# Patient Record
Sex: MALE | Race: OTHER | Employment: FULL TIME | URBAN - METROPOLITAN AREA
[De-identification: names, ages, dates, MRNs, and addresses within clinical notes are randomized per-mention and may not be internally consistent; named-entity substitution may affect disease eponyms.]

---

## 2017-08-02 RX ORDER — MONTELUKAST SODIUM 10 MG/1
TABLET ORAL
Qty: 30 TABLET | Refills: 0 | OUTPATIENT
Start: 2017-08-02

## 2017-08-15 ENCOUNTER — OFFICE VISIT (OUTPATIENT)
Dept: FAMILY MEDICINE CLINIC | Age: 52
End: 2017-08-15
Payer: OTHER GOVERNMENT

## 2017-08-15 VITALS
DIASTOLIC BLOOD PRESSURE: 82 MMHG | HEIGHT: 68 IN | WEIGHT: 163 LBS | HEART RATE: 64 BPM | SYSTOLIC BLOOD PRESSURE: 118 MMHG | BODY MASS INDEX: 24.71 KG/M2

## 2017-08-15 DIAGNOSIS — E03.9 ACQUIRED HYPOTHYROIDISM: ICD-10-CM

## 2017-08-15 DIAGNOSIS — J30.81 ALLERGIC RHINITIS DUE TO ANIMAL HAIR AND DANDER: Primary | ICD-10-CM

## 2017-08-15 PROCEDURE — 99213 OFFICE O/P EST LOW 20 MIN: CPT | Performed by: FAMILY MEDICINE

## 2017-08-15 RX ORDER — LEVOTHYROXINE SODIUM 112 UG/1
112 TABLET ORAL DAILY
COMMUNITY
End: 2019-05-06 | Stop reason: SDUPTHER

## 2017-08-15 ASSESSMENT — ENCOUNTER SYMPTOMS
DIARRHEA: 0
COUGH: 0
SHORTNESS OF BREATH: 0
EYES NEGATIVE: 1
ABDOMINAL PAIN: 0
CONSTIPATION: 0

## 2017-08-15 ASSESSMENT — PATIENT HEALTH QUESTIONNAIRE - PHQ9
SUM OF ALL RESPONSES TO PHQ QUESTIONS 1-9: 0
SUM OF ALL RESPONSES TO PHQ9 QUESTIONS 1 & 2: 0
1. LITTLE INTEREST OR PLEASURE IN DOING THINGS: 0
2. FEELING DOWN, DEPRESSED OR HOPELESS: 0

## 2017-09-04 RX ORDER — MONTELUKAST SODIUM 10 MG/1
TABLET ORAL
Qty: 30 TABLET | Refills: 3 | Status: SHIPPED | OUTPATIENT
Start: 2017-09-04 | End: 2018-01-07 | Stop reason: SDUPTHER

## 2018-01-08 RX ORDER — MONTELUKAST SODIUM 10 MG/1
TABLET ORAL
Qty: 30 TABLET | Refills: 2 | Status: SHIPPED | OUTPATIENT
Start: 2018-01-08 | End: 2018-04-06 | Stop reason: SDUPTHER

## 2018-02-27 ENCOUNTER — OFFICE VISIT (OUTPATIENT)
Dept: FAMILY MEDICINE CLINIC | Age: 53
End: 2018-02-27
Payer: OTHER GOVERNMENT

## 2018-02-27 VITALS
HEART RATE: 60 BPM | WEIGHT: 156 LBS | BODY MASS INDEX: 23.64 KG/M2 | SYSTOLIC BLOOD PRESSURE: 118 MMHG | DIASTOLIC BLOOD PRESSURE: 78 MMHG | HEIGHT: 68 IN | TEMPERATURE: 98.3 F

## 2018-02-27 DIAGNOSIS — L08.9 INFECTED FINGER: Primary | ICD-10-CM

## 2018-02-27 PROCEDURE — 99213 OFFICE O/P EST LOW 20 MIN: CPT | Performed by: FAMILY MEDICINE

## 2018-02-27 RX ORDER — CEPHALEXIN 500 MG/1
500 CAPSULE ORAL 3 TIMES DAILY
Qty: 21 CAPSULE | Refills: 0 | Status: SHIPPED | OUTPATIENT
Start: 2018-02-27 | End: 2018-03-06

## 2018-02-27 RX ORDER — SILDENAFIL CITRATE 100 MG
TABLET ORAL
COMMUNITY
Start: 2018-02-12 | End: 2019-04-02 | Stop reason: SDUPTHER

## 2018-02-27 ASSESSMENT — ENCOUNTER SYMPTOMS: ROS SKIN COMMENTS: SEE HPI

## 2018-02-27 NOTE — PATIENT INSTRUCTIONS
Patient Education        Paronychia: Care Instructions  Your Care Instructions  Paronychia (say \"[id number]-[id number]\") is an infection of the skin around a fingernail or toenail. It happens when germs enter through a break in the skin. The doctor may have made a small cut in the infected area to drain the pus. Most cases of paronychia improve in a few days. But watch your symptoms and follow your doctor's advice. Though rare, a mild case can turn into something more serious and infect your entire finger or toe. Also, it is possible for an infection to return. Follow-up care is a key part of your treatment and safety. Be sure to make and go to all appointments, and call your doctor if you are having problems. It's also a good idea to know your test results and keep a list of the medicines you take. How can you care for yourself at home? · If your doctor told you how to care for your infected nail, follow the doctor's instructions. If you did not get instructions, follow this general advice:  ¨ Wash the area with clean water 2 times a day. Don't use hydrogen peroxide or alcohol, which can slow healing. ¨ You may cover the area with a thin layer of petroleum jelly, such as Vaseline, and a nonstick bandage. ¨ Apply more petroleum jelly and replace the bandage as needed. · If your doctor prescribed antibiotics, take them as directed. Do not stop taking them just because you feel better. You need to take the full course of antibiotics. · Take an over-the-counter pain medicine, such as acetaminophen (Tylenol), ibuprofen (Advil, Motrin), or naproxen (Aleve). Read and follow all instructions on the label. · Do not take two or more pain medicines at the same time unless the doctor told you to. Many pain medicines have acetaminophen, which is Tylenol. Too much acetaminophen (Tylenol) can be harmful. · Prop up the toe or finger so that it is higher than the level of your heart.  This will help with pain and

## 2018-03-05 ENCOUNTER — OFFICE VISIT (OUTPATIENT)
Dept: FAMILY MEDICINE CLINIC | Age: 53
End: 2018-03-05
Payer: OTHER GOVERNMENT

## 2018-03-05 VITALS
DIASTOLIC BLOOD PRESSURE: 82 MMHG | BODY MASS INDEX: 24.02 KG/M2 | TEMPERATURE: 97.9 F | HEART RATE: 64 BPM | SYSTOLIC BLOOD PRESSURE: 128 MMHG | WEIGHT: 158 LBS

## 2018-03-05 DIAGNOSIS — L03.012 CELLULITIS OF FINGER OF LEFT HAND: Primary | ICD-10-CM

## 2018-03-05 PROCEDURE — 99212 OFFICE O/P EST SF 10 MIN: CPT | Performed by: FAMILY MEDICINE

## 2018-03-05 RX ORDER — SULFAMETHOXAZOLE AND TRIMETHOPRIM 800; 160 MG/1; MG/1
1 TABLET ORAL 2 TIMES DAILY
Qty: 14 TABLET | Refills: 0 | Status: SHIPPED | OUTPATIENT
Start: 2018-03-05 | End: 2018-03-12

## 2018-03-05 ASSESSMENT — ENCOUNTER SYMPTOMS: ROS SKIN COMMENTS: SEE HPI

## 2018-03-07 ENCOUNTER — OFFICE VISIT (OUTPATIENT)
Dept: FAMILY MEDICINE CLINIC | Age: 53
End: 2018-03-07
Payer: OTHER GOVERNMENT

## 2018-03-07 VITALS
HEIGHT: 68 IN | SYSTOLIC BLOOD PRESSURE: 138 MMHG | OXYGEN SATURATION: 98 % | HEART RATE: 60 BPM | DIASTOLIC BLOOD PRESSURE: 80 MMHG | BODY MASS INDEX: 23.79 KG/M2 | WEIGHT: 157 LBS

## 2018-03-07 DIAGNOSIS — L03.012 CELLULITIS OF FINGER OF LEFT HAND: Primary | ICD-10-CM

## 2018-03-07 PROCEDURE — 99212 OFFICE O/P EST SF 10 MIN: CPT | Performed by: FAMILY MEDICINE

## 2018-03-07 ASSESSMENT — ENCOUNTER SYMPTOMS: ROS SKIN COMMENTS: SEE HPI

## 2018-03-07 NOTE — PROGRESS NOTES
Visit Information    Have you changed or started any medications since your last visit including any over-the-counter medicines, vitamins, or herbal medicines? Yes abx  Have you stopped taking any of your medications? Is so, why? -  no  Are you having any side effects from any of your medications? - no    Have you seen any other physician or provider since your last visit?  no   Have you had any other diagnostic tests since your last visit?  no   Have you been seen in the emergency room and/or had an admission in a hospital since we last saw you?  no   Have you had your routine dental cleaning in the past 6 months?  no     Do you have an active MyChart account? If no, what is the barrier?   Yes    Patient Care Team:  Laith Jarrett MD as PCP - General (Family Medicine)    Medical History Review  Past Medical, Family, and Social History reviewed and does contribute to the patient presenting condition    Health Maintenance   Topic Date Due    TSH testing  1965    Shingles Vaccine (1 of 2 - 2 Dose Series) 09/07/2016    Colon cancer screen colonoscopy  09/07/2016    Flu vaccine (1) 08/10/2018 (Originally 9/1/2017)    DTaP/Tdap/Td vaccine (1 - Tdap) 09/16/2019 (Originally 9/7/1985)    Lipid screen  02/26/2020    HIV screen  Completed

## 2018-04-30 ENCOUNTER — OFFICE VISIT (OUTPATIENT)
Dept: FAMILY MEDICINE CLINIC | Age: 53
End: 2018-04-30
Payer: OTHER GOVERNMENT

## 2018-04-30 VITALS
SYSTOLIC BLOOD PRESSURE: 128 MMHG | DIASTOLIC BLOOD PRESSURE: 78 MMHG | WEIGHT: 157 LBS | HEART RATE: 64 BPM | TEMPERATURE: 97.7 F | BODY MASS INDEX: 23.87 KG/M2

## 2018-04-30 DIAGNOSIS — H10.503 BLEPHAROCONJUNCTIVITIS OF BOTH EYES, UNSPECIFIED BLEPHAROCONJUNCTIVITIS TYPE: Primary | ICD-10-CM

## 2018-04-30 PROCEDURE — 99212 OFFICE O/P EST SF 10 MIN: CPT | Performed by: FAMILY MEDICINE

## 2018-04-30 RX ORDER — SULFACETAMIDE SODIUM 100 MG/ML
SOLUTION/ DROPS OPHTHALMIC
COMMUNITY
Start: 2018-04-29 | End: 2018-05-06

## 2018-04-30 ASSESSMENT — ENCOUNTER SYMPTOMS
EYE REDNESS: 1
EYE DISCHARGE: 1
PHOTOPHOBIA: 1
BLURRED VISION: 0

## 2018-06-22 ENCOUNTER — OFFICE VISIT (OUTPATIENT)
Dept: FAMILY MEDICINE CLINIC | Age: 53
End: 2018-06-22
Payer: OTHER GOVERNMENT

## 2018-06-22 ENCOUNTER — HOSPITAL ENCOUNTER (OUTPATIENT)
Age: 53
Setting detail: SPECIMEN
Discharge: HOME | End: 2018-06-22
Payer: OTHER GOVERNMENT

## 2018-06-22 VITALS
BODY MASS INDEX: 23.19 KG/M2 | HEIGHT: 68 IN | DIASTOLIC BLOOD PRESSURE: 80 MMHG | HEART RATE: 64 BPM | WEIGHT: 153 LBS | SYSTOLIC BLOOD PRESSURE: 130 MMHG | TEMPERATURE: 97.5 F

## 2018-06-22 DIAGNOSIS — J02.9 SORE THROAT: Primary | ICD-10-CM

## 2018-06-22 LAB — S PYO AG THROAT QL: NORMAL

## 2018-06-22 PROCEDURE — 99213 OFFICE O/P EST LOW 20 MIN: CPT | Performed by: FAMILY MEDICINE

## 2018-06-22 PROCEDURE — 87880 STREP A ASSAY W/OPTIC: CPT | Performed by: FAMILY MEDICINE

## 2018-06-22 RX ORDER — AMOXICILLIN 875 MG/1
875 TABLET, COATED ORAL 2 TIMES DAILY
Qty: 14 TABLET | Refills: 0 | Status: SHIPPED | OUTPATIENT
Start: 2018-06-22 | End: 2018-06-29

## 2018-06-22 ASSESSMENT — ENCOUNTER SYMPTOMS
NAUSEA: 0
SPUTUM PRODUCTION: 0
SORE THROAT: 1
DIARRHEA: 1
ABDOMINAL PAIN: 0
COUGH: 1

## 2018-06-24 LAB
CULTURE: NORMAL
CULTURE: NORMAL
Lab: NORMAL
SPECIMEN DESCRIPTION: NORMAL
STATUS: NORMAL

## 2018-10-25 ENCOUNTER — TELEPHONE (OUTPATIENT)
Dept: FAMILY MEDICINE CLINIC | Age: 53
End: 2018-10-25

## 2018-10-25 NOTE — TELEPHONE ENCOUNTER
Telephone Outcome: Unable to reach / no voice mail. Tried calling patient to find out if he has had a colonoscopy done and if not if he would be willing to come to our office and  a FIT test

## 2018-12-06 ENCOUNTER — OFFICE VISIT (OUTPATIENT)
Dept: FAMILY MEDICINE CLINIC | Age: 53
End: 2018-12-06
Payer: OTHER GOVERNMENT

## 2018-12-06 VITALS
HEART RATE: 68 BPM | DIASTOLIC BLOOD PRESSURE: 80 MMHG | HEIGHT: 68 IN | BODY MASS INDEX: 23.57 KG/M2 | WEIGHT: 155.5 LBS | SYSTOLIC BLOOD PRESSURE: 118 MMHG

## 2018-12-06 DIAGNOSIS — M25.561 ACUTE PAIN OF RIGHT KNEE: ICD-10-CM

## 2018-12-06 DIAGNOSIS — J30.89 NON-SEASONAL ALLERGIC RHINITIS, UNSPECIFIED TRIGGER: Primary | ICD-10-CM

## 2018-12-06 PROCEDURE — 99213 OFFICE O/P EST LOW 20 MIN: CPT | Performed by: FAMILY MEDICINE

## 2018-12-06 RX ORDER — CETIRIZINE HYDROCHLORIDE 10 MG/1
CAPSULE, LIQUID FILLED ORAL
COMMUNITY

## 2018-12-06 RX ORDER — AZELASTINE 1 MG/ML
2 SPRAY, METERED NASAL 2 TIMES DAILY
Qty: 4 BOTTLE | Refills: 1 | Status: SHIPPED | OUTPATIENT
Start: 2018-12-06 | End: 2020-07-29 | Stop reason: SDUPTHER

## 2018-12-06 ASSESSMENT — PATIENT HEALTH QUESTIONNAIRE - PHQ9
1. LITTLE INTEREST OR PLEASURE IN DOING THINGS: 0
2. FEELING DOWN, DEPRESSED OR HOPELESS: 0
SUM OF ALL RESPONSES TO PHQ QUESTIONS 1-9: 0
SUM OF ALL RESPONSES TO PHQ9 QUESTIONS 1 & 2: 0
SUM OF ALL RESPONSES TO PHQ QUESTIONS 1-9: 0

## 2018-12-06 ASSESSMENT — ENCOUNTER SYMPTOMS
COUGH: 0
RHINORRHEA: 1
ABDOMINAL PAIN: 0
SHORTNESS OF BREATH: 0
EYES NEGATIVE: 1

## 2018-12-06 NOTE — PROGRESS NOTES
Visit Information    Have you changed or started any medications since your last visit including any over-the-counter medicines, vitamins, or herbal medicines? no   Are you having any side effects from any of your medications? -  no  Have you stopped taking any of your medications? Is so, why? -  no    Have you seen any other physician or provider since your last visit? No  Have you had any other diagnostic tests since your last visit? No  Have you been seen in the emergency room and/or had an admission to a hospital since we last saw you? No  Have you had your routine dental cleaning in the past 6 months? yes - September    Have you activated your [email or web address] account? If not, what are your barriers?  Yes     Patient Care Team:  Neftali Villagran MD as PCP - General (Family Medicine)    Medical History Review  Past Medical, Family, and Social History reviewed and does contribute to the patient presenting condition    Health Maintenance   Topic Date Due    TSH testing  1966    Shingles Vaccine (1 of 2 - 2 Dose Series) 09/07/2016    Colon cancer screen colonoscopy  09/07/2016    Flu vaccine (1) 09/01/2018    DTaP/Tdap/Td vaccine (1 - Tdap) 09/16/2019 (Originally 9/7/1985)    Lipid screen  02/26/2020    HIV screen  Completed
Cardiovascular: Normal rate, regular rhythm and normal heart sounds. No murmur heard. Pulmonary/Chest: Effort normal and breath sounds normal. He has no wheezes. He has no rales. Abdominal: Soft. Musculoskeletal: Normal range of motion. He exhibits tenderness (rt medial knee. no effusion). He exhibits no edema or deformity. Lymphadenopathy:     He has no cervical adenopathy. Neurological: He is alert and oriented to person, place, and time. Skin: Skin is warm and dry. Psychiatric: He has a normal mood and affect. His behavior is normal.       Assessment/PLan  1. Non-seasonal allergic rhinitis, unspecified trigger  Cont current meds, add astelin. Refer to allergist if needed. - azelastine (ASTELIN) 0.1 % nasal spray; 2 sprays by Nasal route 2 times daily 2 Spray in each nostril  Dispense: 4 Bottle; Refill: 1    2. Acute pain of right knee  Cont gentle exercise, leg strengthening exercises. Refer to PT if wanted. Ice if sore. Ida Palm received counseling on the following healthy behaviors: nutrition, exercise and medication adherence  Reviewed prior labs and health maintenance. Continue current medications, diet and exercise. Discussed use, benefit, and side effects of prescribed medications. Barriers to medication compliance addressed. Patient given educational materials - see patient instructions. All patient questions answered. Patient voiced understanding. Return if symptoms worsen or fail to improve.         Electronically signed by Ilan Cervantes MD on 12/6/18 at 8:08 AM

## 2018-12-10 ENCOUNTER — TELEPHONE (OUTPATIENT)
Dept: FAMILY MEDICINE CLINIC | Age: 53
End: 2018-12-10

## 2018-12-10 DIAGNOSIS — M25.561 ACUTE PAIN OF RIGHT KNEE: Primary | ICD-10-CM

## 2018-12-13 ENCOUNTER — HOSPITAL ENCOUNTER (OUTPATIENT)
Dept: PHYSICAL THERAPY | Facility: CLINIC | Age: 53
Setting detail: THERAPIES SERIES
Discharge: HOME | End: 2018-12-13
Payer: OTHER GOVERNMENT

## 2018-12-13 PROCEDURE — 97110 THERAPEUTIC EXERCISES: CPT

## 2018-12-13 PROCEDURE — 97016 VASOPNEUMATIC DEVICE THERAPY: CPT

## 2018-12-13 PROCEDURE — 97161 PT EVAL LOW COMPLEX 20 MIN: CPT

## 2018-12-13 NOTE — CONSULTS
Activity               [x] Ultrasound                  [x] Electrical Stimulation  [x] Gait Training                          [x] Massage        [] Lumbar/Cervical Traction  [x] Neuromuscular Re-education            [x] Cold/hotpack  [x] Iontophoresis: 4 mg/mL  [x] Instruction in HEP                                                                       Dexamethasone Sodium  [x] Manual Therapy                                                                          Phosphate 40-80 mAmin  [x] Aquatic Therapy                     [x] Vasocompression/                   [x] Other: Dry Needling                                                               Game Ready                            [x]  Medication allergies reviewed for use of                                                                                                               Dexamethasone Sodium Phosphate 4mg/ml                                                                                                                with iontophoresis treatments.                                                                                                              Pt is not allergic.   Frequency:  1-2 x/week EHP 73 TSGNLP     Todays Treatment:  Modalities: Vaso 34 deg min setting with R LE propped on wedge for 15 min, Kinesiotape: lymphatic taping technique to R knee  Precautions:  Exercises: RLE - HEP Review - see below for HEP  Exercise Reps/ Time Weight/ Level Comments   SCI-FIT/TM      TM retro walking   PROM         PATELLA MOBS     add next             HS Stretch  3x30\"       QUAD SETS 10x5\"       HS SETS         HEEL SLIDES 10X5\"       SAQ'S 10x       SLR 10x       SLR with ER         BRIDGES                   SIDE HIP ABD 10x       SIDE HIP ADD                   PRONE         HIP EXT  10x       HS CURLS         PRONE HANG                  SEATED          LAQ'S 10x       MARCHES 10X       ANKLE PUMPS 10X   HEP   KNEE AAROM  10x   Flexion/Extension

## 2019-01-03 ENCOUNTER — HOSPITAL ENCOUNTER (OUTPATIENT)
Dept: PHYSICAL THERAPY | Facility: CLINIC | Age: 54
Setting detail: THERAPIES SERIES
Discharge: HOME | End: 2019-01-03
Payer: OTHER GOVERNMENT

## 2019-01-03 PROCEDURE — 97530 THERAPEUTIC ACTIVITIES: CPT

## 2019-01-07 ENCOUNTER — HOSPITAL ENCOUNTER (OUTPATIENT)
Dept: PHYSICAL THERAPY | Facility: CLINIC | Age: 54
Setting detail: THERAPIES SERIES
Discharge: HOME | End: 2019-01-07
Payer: OTHER GOVERNMENT

## 2019-01-07 PROCEDURE — 97530 THERAPEUTIC ACTIVITIES: CPT

## 2019-01-14 ENCOUNTER — HOSPITAL ENCOUNTER (OUTPATIENT)
Dept: PHYSICAL THERAPY | Facility: CLINIC | Age: 54
Setting detail: THERAPIES SERIES
Discharge: HOME | End: 2019-01-14
Payer: OTHER GOVERNMENT

## 2019-01-14 PROCEDURE — 97530 THERAPEUTIC ACTIVITIES: CPT

## 2019-01-17 ENCOUNTER — HOSPITAL ENCOUNTER (OUTPATIENT)
Dept: PHYSICAL THERAPY | Facility: CLINIC | Age: 54
Setting detail: THERAPIES SERIES
Discharge: HOME | End: 2019-01-17
Payer: OTHER GOVERNMENT

## 2019-01-17 PROCEDURE — 97530 THERAPEUTIC ACTIVITIES: CPT

## 2019-01-21 ENCOUNTER — HOSPITAL ENCOUNTER (OUTPATIENT)
Dept: PHYSICAL THERAPY | Facility: CLINIC | Age: 54
Setting detail: THERAPIES SERIES
Discharge: HOME | End: 2019-01-21
Payer: OTHER GOVERNMENT

## 2019-01-24 ENCOUNTER — HOSPITAL ENCOUNTER (OUTPATIENT)
Dept: PHYSICAL THERAPY | Facility: CLINIC | Age: 54
Setting detail: THERAPIES SERIES
Discharge: HOME | End: 2019-01-24
Payer: OTHER GOVERNMENT

## 2019-01-24 PROCEDURE — 97530 THERAPEUTIC ACTIVITIES: CPT

## 2019-01-28 ENCOUNTER — HOSPITAL ENCOUNTER (OUTPATIENT)
Dept: PHYSICAL THERAPY | Facility: CLINIC | Age: 54
Setting detail: THERAPIES SERIES
Discharge: HOME | End: 2019-01-28
Payer: OTHER GOVERNMENT

## 2019-01-28 PROCEDURE — 97530 THERAPEUTIC ACTIVITIES: CPT

## 2019-01-31 ENCOUNTER — HOSPITAL ENCOUNTER (OUTPATIENT)
Dept: PHYSICAL THERAPY | Facility: CLINIC | Age: 54
Setting detail: THERAPIES SERIES
Discharge: HOME | End: 2019-01-31
Payer: OTHER GOVERNMENT

## 2019-01-31 PROCEDURE — 97530 THERAPEUTIC ACTIVITIES: CPT

## 2019-02-04 ENCOUNTER — HOSPITAL ENCOUNTER (OUTPATIENT)
Dept: PHYSICAL THERAPY | Facility: CLINIC | Age: 54
Setting detail: THERAPIES SERIES
Discharge: HOME | End: 2019-02-04
Payer: OTHER GOVERNMENT

## 2019-02-04 PROCEDURE — 97530 THERAPEUTIC ACTIVITIES: CPT

## 2019-02-07 ENCOUNTER — HOSPITAL ENCOUNTER (OUTPATIENT)
Dept: PHYSICAL THERAPY | Facility: CLINIC | Age: 54
Setting detail: THERAPIES SERIES
Discharge: HOME | End: 2019-02-07
Payer: OTHER GOVERNMENT

## 2019-02-07 PROCEDURE — 97530 THERAPEUTIC ACTIVITIES: CPT

## 2019-02-11 ENCOUNTER — HOSPITAL ENCOUNTER (OUTPATIENT)
Dept: PHYSICAL THERAPY | Facility: CLINIC | Age: 54
Setting detail: THERAPIES SERIES
Discharge: HOME | End: 2019-02-11
Payer: OTHER GOVERNMENT

## 2019-02-11 PROCEDURE — 97530 THERAPEUTIC ACTIVITIES: CPT

## 2019-02-14 ENCOUNTER — HOSPITAL ENCOUNTER (OUTPATIENT)
Dept: PHYSICAL THERAPY | Facility: CLINIC | Age: 54
Setting detail: THERAPIES SERIES
Discharge: HOME | End: 2019-02-14
Payer: OTHER GOVERNMENT

## 2019-02-14 PROCEDURE — 97530 THERAPEUTIC ACTIVITIES: CPT

## 2019-02-18 ENCOUNTER — HOSPITAL ENCOUNTER (OUTPATIENT)
Dept: PHYSICAL THERAPY | Facility: CLINIC | Age: 54
Setting detail: THERAPIES SERIES
Discharge: HOME | End: 2019-02-18
Payer: OTHER GOVERNMENT

## 2019-02-18 PROCEDURE — 97530 THERAPEUTIC ACTIVITIES: CPT

## 2019-02-21 ENCOUNTER — HOSPITAL ENCOUNTER (OUTPATIENT)
Dept: PHYSICAL THERAPY | Facility: CLINIC | Age: 54
Setting detail: THERAPIES SERIES
Discharge: HOME | End: 2019-02-21
Payer: OTHER GOVERNMENT

## 2019-02-21 PROCEDURE — 97530 THERAPEUTIC ACTIVITIES: CPT

## 2019-03-21 ENCOUNTER — HOSPITAL ENCOUNTER (OUTPATIENT)
Dept: PHYSICAL THERAPY | Facility: CLINIC | Age: 54
Setting detail: THERAPIES SERIES
Discharge: HOME | End: 2019-03-21
Payer: OTHER GOVERNMENT

## 2019-03-28 ENCOUNTER — HOSPITAL ENCOUNTER (OUTPATIENT)
Dept: PHYSICAL THERAPY | Facility: CLINIC | Age: 54
Setting detail: THERAPIES SERIES
Discharge: HOME | End: 2019-03-28
Payer: OTHER GOVERNMENT

## 2019-03-28 PROCEDURE — 97110 THERAPEUTIC EXERCISES: CPT

## 2019-03-28 PROCEDURE — 97530 THERAPEUTIC ACTIVITIES: CPT

## 2019-04-01 ENCOUNTER — APPOINTMENT (OUTPATIENT)
Dept: PHYSICAL THERAPY | Facility: CLINIC | Age: 54
End: 2019-04-01
Payer: OTHER GOVERNMENT

## 2019-04-02 ENCOUNTER — OFFICE VISIT (OUTPATIENT)
Dept: FAMILY MEDICINE CLINIC | Age: 54
End: 2019-04-02
Payer: OTHER GOVERNMENT

## 2019-04-02 VITALS
BODY MASS INDEX: 24.33 KG/M2 | WEIGHT: 160 LBS | SYSTOLIC BLOOD PRESSURE: 126 MMHG | HEART RATE: 60 BPM | DIASTOLIC BLOOD PRESSURE: 84 MMHG

## 2019-04-02 DIAGNOSIS — E78.2 MIXED HYPERLIPIDEMIA: ICD-10-CM

## 2019-04-02 DIAGNOSIS — R06.83 SNORING: Primary | ICD-10-CM

## 2019-04-02 DIAGNOSIS — N52.03 COMBINED ARTERIAL INSUFFICIENCY AND CORPORO-VENOUS OCCLUSIVE ERECTILE DYSFUNCTION: ICD-10-CM

## 2019-04-02 DIAGNOSIS — G47.19 EXCESSIVE DAYTIME SLEEPINESS: ICD-10-CM

## 2019-04-02 DIAGNOSIS — R53.82 CHRONIC FATIGUE: ICD-10-CM

## 2019-04-02 PROCEDURE — 99214 OFFICE O/P EST MOD 30 MIN: CPT | Performed by: FAMILY MEDICINE

## 2019-04-02 RX ORDER — SILDENAFIL CITRATE 100 MG
100 TABLET ORAL PRN
Qty: 10 TABLET | Refills: 3 | Status: SHIPPED | OUTPATIENT
Start: 2019-04-02 | End: 2020-10-09

## 2019-04-02 ASSESSMENT — PATIENT HEALTH QUESTIONNAIRE - PHQ9
1. LITTLE INTEREST OR PLEASURE IN DOING THINGS: 0
2. FEELING DOWN, DEPRESSED OR HOPELESS: 0
SUM OF ALL RESPONSES TO PHQ9 QUESTIONS 1 & 2: 0
SUM OF ALL RESPONSES TO PHQ QUESTIONS 1-9: 0
SUM OF ALL RESPONSES TO PHQ QUESTIONS 1-9: 0

## 2019-04-02 ASSESSMENT — ENCOUNTER SYMPTOMS
COUGH: 0
EYES NEGATIVE: 1
APNEA: 1
ABDOMINAL PAIN: 0
SHORTNESS OF BREATH: 0

## 2019-04-02 NOTE — PROGRESS NOTES
[facility] PHYSICIANS  JACKI SAUCEDA [facility] [address] [facility]  [id number] [place] [address]  Dept: [phone]    4/2/2019    CHIEF COMPLAINT    Chief Complaint   Patient presents with    Snoring       HPI    Roberto Carlos Ariza is a 47 y.o. male who presents   Chief Complaint   Patient presents with    Snoring   . Concerned that he may have sleep apnea. Wife notices he snores and awakes abruptly. Awakening frequently. Has daytime sleepiness and fatigue. Cannot sleep on his back. Rarely sleeps more than 5 hours at a time. Has allergies, taking singulair, nose spray and antihistamine. Astelin, added at last appt has been helpful. Vitals:    04/02/19 0830   BP: 126/84   Pulse: 60   Weight: 160 lb (72.6 kg)       REVIEW OF SYSTEMS    Review of Systems   Constitutional: Positive for fatigue. Negative for fever. HENT: Negative. Eyes: Negative. Respiratory: Positive for apnea (witnessed by wife). Negative for cough and shortness of breath. Cardiovascular: Negative for chest pain and leg swelling. Gastrointestinal: Negative for abdominal pain. Genitourinary: Negative for frequency and urgency. Musculoskeletal: Negative. Skin: Negative. Neurological: Negative for dizziness and headaches. Psychiatric/Behavioral: The patient is not nervous/anxious.         PAST MEDICAL HISTORY    Past Medical History:   Diagnosis Date    Allergic rhinitis     Hypothyroidism     Dr. 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, [facility]   Social Needs    Financial resource strain: None    Food insecurity:     Worry: None     Inability: None    Transportation needs:     Medical: None     Non-medical: None   Tobacco Use    Smoking status: Never Smoker    Smokeless tobacco: Never Used   Substance and Sexual Activity    Alcohol use: Yes     Comment: 1 or 2 beers a week    Drug use: No    Sexual activity: Yes     Partners: Female   Lifestyle    Physical activity:     Days per week: None     Minutes per session: None    Stress: None   Relationships    Social connections:     Talks on phone: None     Gets together: None     Attends Nondenominational service: None     Active member of club or organization: None     Attends meetings of clubs or organizations: None     Relationship status: None    Intimate partner violence:     Fear of current or ex partner: None     Emotionally abused: None     Physically abused: None     Forced sexual activity: None   Other Topics Concern    None   Social History Narrative    None       SURGICAL HISTORY    History reviewed. No pertinent surgical history. CURRENT MEDICATIONS    Current Outpatient Medications   Medication Sig Dispense Refill    VIAGRA 100 MG tablet Take 1 tablet by mouth as needed for Erectile Dysfunction 10 tablet 3    montelukast (SINGULAIR) 10 MG tablet TAKE ONE TABLET BY MOUTH ONCE NIGHTLY 30 tablet 3    Cetirizine HCl (ZYRTEC ALLERGY) 10 MG CAPS 1 tablet      azelastine (ASTELIN) 0.1 % nasal spray 2 sprays by Nasal route 2 times daily 2 Spray in each nostril 4 Bottle 1    levothyroxine (SYNTHROID) 112 MCG tablet Take 112 mcg by mouth Daily       No current facility-administered medications for this visit.         ALLERGIES    Allergies   Allergen Reactions    Codeine     Requip [Ropinirole Hcl]        PHYSICAL EXAM   Physical Exam Constitutional: He is oriented to person, place, and time. He appears well-developed and well-nourished. HENT:   Head: Normocephalic. Mouth/Throat: Oropharynx is clear and moist and mucous membranes are normal. Uvula swelling present. Eyes: Pupils are equal, round, and reactive to light. Neck: Normal range of motion. Neck supple. No thyromegaly present. Cardiovascular: Normal rate, regular rhythm and normal heart sounds. No murmur heard. Pulmonary/Chest: Effort normal and breath sounds normal. He has no wheezes. He has no rales. Abdominal: Soft. There is no tenderness. There is no rebound and no guarding. Musculoskeletal: Normal range of motion. He exhibits no edema, tenderness or deformity. Lymphadenopathy:     He has no cervical adenopathy. Neurological: He is alert and oriented to person, place, and time. Skin: Skin is warm and dry. Psychiatric: He has a normal mood and affect. His behavior is normal.   Vitals reviewed. Assessment/PLan  1. Snoring  Referral for sleep study. Discussed options of using cpap vs surgery if indicated. - [facility]    2. Excessive daytime sleepiness  As above. - [facility]    3. Chronic fatigue  As above. Lab order provided to get when he sees endocrinologist.   - [facility]    4. Combined arterial insufficiency and corporo-venous occlusive erectile dysfunction  Try taking 50mg first to check tolerability. Discussed side effects.   - VIAGRA 100 MG tablet; Take 1 tablet by mouth as needed for Erectile Dysfunction  Dispense: 10 tablet; Refill: 3      Xander received counseling on the following healthy behaviors: nutrition, exercise and medication adherence  Reviewed prior labs and health maintenance  Continue current medications, diet and exercise. Discussed use, benefit, and side effects of prescribed medications. Barriers to medication compliance addressed. Patient given educational materials - see patient instructions  Was a self-tracking handout given in paper form or via [employer]? Yes    Requested Prescriptions     Signed Prescriptions Disp Refills    VIAGRA 100 MG tablet 10 tablet 3     Sig: Take 1 tablet by mouth as needed for Erectile Dysfunction       All patient questions answered. Patient voiced understanding. Quality Measures    Body mass index is 24.33 kg/m². Normal. Weight control planned discussed Healthy diet and regular exercise. BP: 126/84 Blood pressure is normal. Treatment plan consists of No treatment change needed. No results found for: LDLCALC, LDLCHOLESTEROL, LDLDIRECT (goal LDL reduction with dx if diabetes is 50% LDL reduction)      PHQ Scores 4/2/2019 12/6/2018 8/15/2017   PHQ2 Score 0 0 0   PHQ9 Score 0 0 0     Interpretation of Total Score Depression Severity: 1-4 = Minimal depression, 5-9 = Mild depression, 10-14 = Moderate depression, 15-19 = Moderately severe depression, 20-27 = Severe depression     Return if symptoms worsen or fail to improve, for fatigue.         Electronically signed by Gregg Cunningham MD on 4/2/19 at 8:37 AM

## 2019-04-24 ENCOUNTER — OFFICE VISIT (OUTPATIENT)
Dept: PULMONOLOGY | Age: 54
End: 2019-04-24
Payer: OTHER GOVERNMENT

## 2019-04-24 VITALS
BODY MASS INDEX: 24.13 KG/M2 | OXYGEN SATURATION: 98 % | WEIGHT: 159.2 LBS | DIASTOLIC BLOOD PRESSURE: 79 MMHG | HEART RATE: 51 BPM | RESPIRATION RATE: 16 BRPM | SYSTOLIC BLOOD PRESSURE: 130 MMHG | HEIGHT: 68 IN

## 2019-04-24 DIAGNOSIS — G47.26 CIRCADIAN RHYTHM SLEEP DISORDER, SHIFT WORK TYPE: ICD-10-CM

## 2019-04-24 DIAGNOSIS — G47.30 SLEEP APNEA, UNSPECIFIED TYPE: Primary | ICD-10-CM

## 2019-04-24 PROCEDURE — 99204 OFFICE O/P NEW MOD 45 MIN: CPT | Performed by: INTERNAL MEDICINE

## 2019-04-24 ASSESSMENT — SLEEP AND FATIGUE QUESTIONNAIRES
HOW LIKELY ARE YOU TO NOD OFF OR FALL ASLEEP WHEN YOU ARE A PASSENGER IN A CAR FOR AN HOUR WITHOUT A BREAK: 0
HOW LIKELY ARE YOU TO NOD OFF OR FALL ASLEEP WHILE WATCHING TV: 0
HOW LIKELY ARE YOU TO NOD OFF OR FALL ASLEEP WHILE SITTING INACTIVE IN A PUBLIC PLACE: 0
HOW LIKELY ARE YOU TO NOD OFF OR FALL ASLEEP WHILE SITTING AND TALKING TO SOMEONE: 0
ESS TOTAL SCORE: 3
HOW LIKELY ARE YOU TO NOD OFF OR FALL ASLEEP WHILE SITTING AND READING: 0
HOW LIKELY ARE YOU TO NOD OFF OR FALL ASLEEP WHILE SITTING QUIETLY AFTER LUNCH WITHOUT ALCOHOL: 0
HOW LIKELY ARE YOU TO NOD OFF OR FALL ASLEEP WHILE LYING DOWN TO REST IN THE AFTERNOON WHEN CIRCUMSTANCES PERMIT: 3
HOW LIKELY ARE YOU TO NOD OFF OR FALL ASLEEP IN A CAR, WHILE STOPPED FOR A FEW MINUTES IN TRAFFIC: 0

## 2019-04-24 NOTE — PROGRESS NOTES
PULMONARY OUTPATIENT CONSULT NOTE     Patient:  Nikkie Damian  MRN: [id number]  [facility] Physician: Suzette dEge MD    HISTORY     CHIEF COMPLAINT/REASON FOR CONSULT: Sleep Apnea (No sleep Study done)     HISTORY OF PRESENT ILLNESS:  Nikkie Damian is a 47 y.o. male who is being evaluated in the clinic today for sleep problems and possible sleep apnea. Symptoms began several years ago, and have been gradually worsening. His major symptoms include:     Snoring Yes    Witnessed apnea  No    Wakes up with choking and gasping sensation No    Dry mouth upon awakening No    Fatigue and tiredness during the day Yes    Morning headaches No    Car wrecks or near wrecks because of the sleepiness No    Nodding off while driving No    Weight gain No    Forgetfulness or decreased concentration No    Nasal congestion or obstruction at night No    Leg jerks during sleep No        Parasomnias: No    Restless feelings, numbness/burning, aches/cramps in legs at night    Loss of muscle strength when angry or laugh    Hallucination when dozing off or waking up from sleep    Paralysis upon awakening from sleep or going to sleep    Teeth grinding    Nightmares     Sleep walking    Night time panic attacks      Sleep Habits:    Patient works at night shift security department. He usually goes to bed at 7 AM, sleeps for 3-4 hours and again tries to lay down for a few hours before his night shift. It takes him about 5-10 minutes to fall asleep  Wakes up a few times during sleep  Usually consumes one caffienated drinks during the day. Patient denies excessive daytime sleepiness and the total score on [place] Sleepiness Scale (ESS) is 3 today.     Sleep Medicine 4/24/2019   Sitting and reading 0   Watching TV 0   Sitting, inactive in a public place (e.g. a theatre or a meeting) 0   As a passenger in a car for an hour without a break 0   Lying down to rest in the afternoon when circumstances permit 3   Sitting and talking to someone 0   Sitting quietly after a lunch without alcohol 0   In a car, while stopped for a few minutes in traffic 0   Total score 3   Neck circumference -     PAST MEDICAL HISTORY:       Diagnosis Date    Allergic rhinitis     Chronic fatigue     Combined arterial insufficiency and corporo-venous occlusive erectile dysfunction     Excessive daytime sleepiness     Hypothyroidism     Dr. Chris Siegel Mixed hyperlipidemia     Snoring      PAST SURGICAL HISTORY:    No past surgical history on file. FAMILY HISTORY:   family history includes Brain Cancer in his father; Cancer in his father; Heart Disease in his mother; High Blood Pressure in his sister; [place] Fought in his father; Pacemaker in his mother; Stroke in his father; Thyroid Disease in his brother, brother, and mother. SOCIAL AND OCCUPATIONAL HISTORY:  TOBACCO:   reports that he has never smoked. He has never used smokeless tobacco.  ETOH:   reports that he drinks alcohol. DRUGS: reports that he does not use drugs.    IMMUNIZATION HISTORY:    Immunization History   Administered Date(s) Administered    Zoster Subunit (Shingrix) 02/07/2019, 05/23/2019     REVIEW OF SYSTEMS:   General: positive for- sleep disturbance, negative for - chills, fever, night sweats, weight gain or weight loss  Psychological: negative for - anxiety or depression  Ophthalmic: negative for - blurry vision or decreased vision  ENT: negative  Allergy and Immunology: negative  Hematological and Lymphatic: negative for - bleeding problems, blood clots, bruising or swollen lymph nodes  Endocrine: negative for - polydipsia/polyuria or temperature intolerance  Respiratory: no cough, shortness of breath, or wheezing  Cardiovascular: negative for - chest pain, palpitations or paroxysmal nocturnal dyspnea  Gastrointestinal: negative for - abdominal pain, change in bowel habits or nausea/vomiting  Genito-Urinary: no dysuria, trouble voiding, or hematuria  Musculoskeletal: negative for - joint pain or joint swelling  Neurological: negative for - headaches, impaired coordination/balance, speech problems or weakness  Dermatological: negative for - rash or skin lesion changes     ALLERGIES:    Allergies   Allergen Reactions    Codeine     Requip [Ropinirole Hcl]      MEDICATIONS:   Outpatient Encounter Medications as of 4/24/2019   Medication Sig Dispense Refill    montelukast (SINGULAIR) 10 MG tablet TAKE ONE TABLET BY MOUTH ONCE NIGHTLY 30 tablet 5    VIAGRA 100 MG tablet Take 1 tablet by mouth as needed for Erectile Dysfunction 10 tablet 3    Cetirizine HCl (ZYRTEC ALLERGY) 10 MG CAPS 1 tablet      azelastine (ASTELIN) 0.1 % nasal spray 2 sprays by Nasal route 2 times daily 2 Spray in each nostril 4 Bottle 1    [DISCONTINUED] levothyroxine (SYNTHROID) 112 MCG tablet Take 112 mcg by mouth Daily       No facility-administered encounter medications on file as of 4/24/2019. PHYSICAL EXAMINATION:      VITAL SIGNS:   /79 (Site: Left Upper Arm, Position: Sitting)   Pulse 51   Resp 16   Ht 5' 8\" (1.727 m)   Wt 159 lb 3.2 oz (72.2 kg)   SpO2 98% Comment: Room air  BMI 24.21 kg/m²   Wt Readings from Last 3 Encounters:   05/19/19 160 lb (72.6 kg)   05/01/19 160 lb (72.6 kg)   04/24/19 159 lb 3.2 oz (72.2 kg)     SYSTEMIC EXAMINATION:  General appearance - well appearing, and in no distress  Mental status - alert, oriented to person, place, and time  Eyes - pupils equal and reactive, extraocular eye movements intact, sclera anicteric  Mouth - mucous membranes moist, pharynx normal without lesions and tonsils normal  Neck - supple, no significant adenopathy, carotids upstroke normal bilaterally, no bruits  Mallampati Airway Score - II (soft palate, uvula, fauces visible)  Chest - Chest was symmetrical without dullness to percussion. Breath sounds bilaterally were clear to auscultation. There were no wheezes, rhonchi or rales.   There is no intercostal recession or use of accessory muscles  Heart - normal rate, regular rhythm, normal S1, S2, no murmurs, rubs, clicks or gallops, no JVD  Abdomen - soft, nontender, nondistended, no masses or organomegaly  Neurological - alert, oriented, normal speech, no focal findings or movement disorder noted  Extremities - peripheral pulses normal, no pedal edema, no clubbing or cyanosis  Skin - normal coloration and turgor, no rashes, no suspicious skin lesions noted     Labs:  ABGs:   No results found for: PH, PCO2, PO2, HCO3, O2SAT  No results found for: PHART, PO2ART, FXD2CRE, IPL4CFE, BEART, X8MAAHGZ  No results found for: POCPH, POCPCO2, POCPO2, POCHCO3, OUWG2WUN  CBC:   Lab Results   Component Value Date    WBC 3.8 05/04/2019    HGB 15.2 05/04/2019    HCT 44.7 05/04/2019    MCV 89.2 05/04/2019     05/04/2019    LYMPHOPCT 28 05/04/2019    RBC 5.01 05/04/2019    MCH 30.3 05/04/2019    MCHC 34.0 05/04/2019    RDW 12.6 05/04/2019     BMP:   Lab Results   Component Value Date     05/04/2019    K 4.8 05/04/2019     05/04/2019    CO2 27 05/04/2019    BUN 16 05/04/2019    CREATININE 0.91 05/04/2019    GLUCOSE 99 05/04/2019    CALCIUM 9.0 05/04/2019       Liver Function Test:   Lab Results   Component Value Date    ALT 24 05/04/2019    AST 24 05/04/2019    ALKPHOS 54 05/04/2019    BILITOT 0.87 05/04/2019     Coagulation Profile:   No results found for: INR, PROTIME, APTT  Cardiac Enzymes:  No results found for: CKTOTAL, CKMB, CKMBINDEX, TROPONINI  Lactic Acid:  No results found for: LACTA  BNP:   No results found for: BNP  D-Dimer:  No results found for: DDIMER  Others labs:  No results found for: TSH, G2EBSYY, N0XYIIT, THYROIDAB, FT3, T4FREE  No results found for: LABURIC, URICACID  No results found for: YOHANA, RHEUMFACTOR, SEDRATE, CRP  No results found for: IRON, TIBC, FERRITIN  No results found for: SPEP, UPEP  No results found for: PSA, CEA, , BS0509,     Radiological reports:  CXR:    CT Scans:    Pulmonary Function Test:  No results found for: FEV1, FVC, ZEH0KIL, TLC, DLCO    Echocardiogram:    Stress Test/Cardiac Angiography:    Polysomnography:      ASSESSMENT AND PLAN     Assessment:    ICD-10-CM    1. Sleep apnea, unspecified type G47.30 Sleep Study with PAP Titration     Baseline Diagnostic Sleep Study   2. 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 Recommended following good sleep hygiene and instructions provided. We'll see the patient back after the sleep study. Pt is not to drive if sleepy. Influenza Vaccine: Recommend flu vaccination in the fall annually. Up-to-date  Pneumococcal Vaccine: Recommendations given regarding pneumococcal vaccinations. Up-to-date  LUNG CANCER SCREENING: After reviewing the patient's smoking history, age and other clinical criteria, the LOW DOSE CT is : NOT INDICATED; Nonsmoker/Smoking <30 pack-years and NOT INDICATED; Age is < 54 or > 77 years   Patient received counseling on the following healthy behaviors: nutrition, exercise and medication adherence  Maintain an active lifestyle    All the questions that the patient had were answered to his/her satisfaction  We'll see the patient back in 1 month  Thank you for having us involved in the care of your patient. Please call us if you have any questions or concerns. Yoselin Davison MD    Pulmonary and Critical Care Medicine            Please note that this chart was generated using voice recognition Dragon dictation software. Although every effort was made to ensure the accuracy of this automated transcription, some errors in transcription may have occurred.

## 2019-04-25 ENCOUNTER — HOSPITAL ENCOUNTER (OUTPATIENT)
Dept: PHYSICAL THERAPY | Facility: CLINIC | Age: 54
Setting detail: THERAPIES SERIES
Discharge: HOME | End: 2019-04-25
Payer: OTHER GOVERNMENT

## 2019-04-25 PROCEDURE — 97530 THERAPEUTIC ACTIVITIES: CPT

## 2019-04-25 PROCEDURE — 97110 THERAPEUTIC EXERCISES: CPT

## 2019-04-25 NOTE — FLOWSHEET NOTE
4# Weight added 1/7; weight increased 1/14   Clamshells 20x green Added 1/24; resistance added 1/28   Reverse Clamshells 20x green Added 1/31; resistance added 2/18   SIDE HIP ADD 10x 4# PF/DF/CW/CCW; Added 1/14; added weight 2/7             PRONE         HIP EXT  15x Quadruped- with knees not touching mat Weight added 1/7; weight increased 1/14; Added quadruped 1/24; added knees not touching modification to challenge 2/18   HS CURLS 10x2 3# Weight added 1/7; weight increased 1/14             SEATED          Stool scoots  x 1 lap Added 1/7; increased 2/11   Jatin Muff sits 2x30\"  Added 1/17             HEEL RAISES        SQUATS 20x Barbell+30# Barbell added 1/7; weight increased 1/31             STANDING HS STRETCH  1x30\"      STEPDOWNS 20x2 8\" Added set 1/28; increased step height 2/7   R HIP FLEXOR STRETCH 1x30' 12\"              TKE HEP Blue Increased resistance 1/17   3 way hip HEP Blue/foam for R CKC Increased resistance 1/17; Added foam 1/17   Gait Training         R SLS- trampoline 10x  4# not today Added 1/14   R SLS - Cone slides 20x ABD only Mat added 1/17; modified 2/11   R SLS - Cone taps 20x Flex/Ext + foam Added 2/11; foam added 4/25   R SLS - forward reach cone tap w/ hand weight 10x2 8#/foam Added 1/14; mat added & reps increased 1/17; increased weight 2/4; foam added 2/7   R SLS Squats TRX  15x  Added 1/31   Lateral/fwd monster walks 80'x2 black/feet Added resistance 2/11   Walking lunges 80'x2 8# in B UEs Added 2/11; added weight 4/25   Lunges with step over band and cone tap 10x B/blueberry Added resistance 2/11;  Increased reps 1/31; added step over & cone tap 2/4   R step ups with opp LE march 10x2 12\"/8lb ea UE Added 2/14; increased weight 4/25         [employer]      B leg press/ R SL press 10x2/15x2 90lb/30lbs Increased weight 4/25; SL added 1/17   Hip ABD/ADD 10x2 90lb Increased weight 4/25   HS Curl - Eccentric  15x2 20lbs Added 2/11; increased weight 4/25   Skaters  20x  Added 4/25   Multi Hip - Eccentric ABD/EXT  20x 20lbs Added 2/11; increased weight 2/14   Other:      Specific Instructions for next treatment:dynamic balance activities, SLS/stability, stair/gait training, progress to standing R knee ex as per above with emph on knee flexion AROM with manual overpressure, vaso for edema      2/21/19 AROM°  STRENGTH TESTS (+/-)  Right Not Tested     Right Right Ant. Drawer +laxity []   Hip Flex   5 Post. Drawer - []   Ext   4+ Lachmans +laxity []   ER   4+ Valgus Stress +laxity @0deg []   IR   4+ Varus Stress - []   ABD   4+ Ettas -     ADD   4+ Thessaly's  [x]   Knee Flex 150deg 4 Apleys Dist.  [x]   Ext Lacking 1 4+ Ely's + []   Ankle DF   5 LENAs   [x]   PF   5 Pat-Fem Grind  [x]   INV   5 Ballotable patella - []   EVER   5 90/90 Hamstring + []      Treatment Charges: Mins Units   []  Modalities     [x]  Ther Exercise 30 2   []  Manual Therapy     [x]  Ther Activities 25 2   []  Aquatics     []  Vasocompression     []  Other     Total Treatment time 55 4     Assessment: [x] Progressing toward goals. Pt continues with good tolerance to ex advancements and R SLS exercise & activities; however, he required increased cueing for correcting dynamic valgus and overcompensation (lateral displacement over R LE). Post-treatment pt notes fatigue but no pain. [] No change. [] Other:    Problems: Pt presents with S/S of a R knee internal derangement, specifically, R ACL tear & potential R medial meniscal tear. R knee ACL testing presented with pain & significant laxity, as compared to LLE.  Please see below problem list for further details.    [x] ? R Knee Pain: 5/10 at worse; sudden onset with sharp pain                            [x] ? Strength: Decreased throughout R knee, R hip & B gluteals  [x] ? ROM: R knee flexion & extension; lacking 2 degrees extension; flexion 130 deg (lacking 18 degrees flexion, as compared to L knee at 148 degrees)  [x] ? Function: LEFS score = 74/80 = 7.5% deficit                      [x] ? Balance:  RSLS 20sec with trendelenburg sign & R knee dynamic valgus  [x] Edema: Midpatellar girth R 35.5cm; L 35cm; (+) R Knee Effusion testing (ballotable patella)  [x] Postural Deviations: B Lower crossed syndrome  [x] Gait Deviations: R Medial heel whip with mild decreased R heel toe progression, R toe out gait pattern  [x] Other:  (+) R Knee Ligamentous Testing: Lachman's, Anterior Drawer, Valgus @0deg; (+) R knee Medial Meniscal testing: Etta's, Thessaly's, Tender to palpation at R Knee Medial Joint Line; (+)Flexibility tests: B Hamstring 90/90, B Ely's; (+) R knee Patellar/ Femoral Grind test              STG: (to be met in 7 treatments) As of 1/28/19  1. ? R Knee Pain: to <3/10 for ease with ADLs. MET, pt consistently with low pain levels &/or pain-free  2. ? R Knee ROM: by 2 degrees for extension & 5-10 degrees for flexion to improve overall functional mobility. Partially MET, Flexion 156deg, Ext lacking 1 degree  3. ? RLE Strength: emphasis on R knee, R hip & R gluteals, by 1/2 to a full grade to improve gait & standing tolerance. MET, see objective above  4. Independent with Home Exercise Programs MET  5. ? Balance: Pt to demonstrate RLE SLS of 25sec or greater, without compensations, for improved overall static & dynamic balance. Partially MET, R SLS 30s, but mild trendelenburg   6. Pt to be able to perform 10 squats without present of R dynamic valgus, for better squat mechanics. MET, able to self-correct       LTG: (to be met in 14 treatments) As of 2/21/19  1. ? R Knee Pain: to <1-2/10 for ease with ADLs. MET, currently denies pain. 2. Pt to demo 0% impairment on LEFS to improve overall functional mobility. MET, 0% deficit. 3. ? RLE Strength: emphasis on R knee, R hip, R gluteals, to WNLs to improve gait & standing tolerance. Partially MET, see objective above. Pt made strength improvements, but still lacks in R hamstring strength and R hip/gluteal musculature. 4. ? Balance: Pt to demonstrate RLE SLS of 30sec or greater, without compensations, for improved overall static & dynamic balance. NOT MET, pt demostrated R SLS of 24 sec, regressing from last assessment, he also continues trendelenburg sign & increased ankle strategies to perform. 5. Pt to demo improved R HS length by testing <20deg for 90/90 HS length test to improve overall HS flexibility. NOT MET, pt measured at 29degrees   6. Pt to be able to navigate full flight of stairs independently, without increase in s/s, to improve stair navigation. MET  7. Pt to be able to return to running hobby of 6 miles per week without increase in pain & with good running technique. NOT MET, pt reports no longer running, due to decreased knee stability.       Patient goals: \"removal of pain & to be able to begin strength training program\"    Pt. Education:  [x] Yes  [] No  [x] Reviewed Prior HEP/Ed  Method of Education: [x] Verbal  [x] Demo for charted ex [] Written  Comprehension of Education:  [x] Verbalizes understanding. [x] Demonstrates understanding. [] Needs review. [x] Demonstrates/verbalizes HEP/Ed previously given. 2/14: Various Bridges  & S/L hip ABD exercises    Plan: [x] Continue per plan of care.  19vs   [x] Other:       Time In: 325pm            Time Out: 430pm    Electronically signed by:  Jessica Dobbins PT

## 2019-05-01 ENCOUNTER — OFFICE VISIT (OUTPATIENT)
Dept: FAMILY MEDICINE CLINIC | Age: 54
End: 2019-05-01
Payer: OTHER GOVERNMENT

## 2019-05-01 VITALS
BODY MASS INDEX: 24.33 KG/M2 | DIASTOLIC BLOOD PRESSURE: 80 MMHG | WEIGHT: 160 LBS | HEART RATE: 56 BPM | SYSTOLIC BLOOD PRESSURE: 118 MMHG

## 2019-05-01 DIAGNOSIS — K64.4 EXTERNAL HEMORRHOID: Primary | ICD-10-CM

## 2019-05-01 PROCEDURE — 99212 OFFICE O/P EST SF 10 MIN: CPT | Performed by: FAMILY MEDICINE

## 2019-05-01 ASSESSMENT — ENCOUNTER SYMPTOMS
CONSTIPATION: 0
ABDOMINAL PAIN: 0
SHORTNESS OF BREATH: 0
COUGH: 0
EYES NEGATIVE: 1
DIARRHEA: 0

## 2019-05-01 NOTE — PROGRESS NOTES
[facility]  JACKI SAUCEDA [facility]  [address]  [address] [facility] [place] [address]  Dept: [phone]    5/1/2019    CHIEF COMPLAINT    Chief Complaint   Patient presents with    Hemorrhoids     getting bigger       HPI    Nikkie Damian is a 47 y.o. male who presents   Chief Complaint   Patient presents with    Hemorrhoids     getting bigger   . External hemorrhoid which has been enlarging. Stools are soft. No bleeding. Has tried conservative therapy. Vitals:    05/01/19 0953   BP: 118/80   Pulse: 56   Weight: 160 lb (72.6 kg)       REVIEW OF SYSTEMS    Review of Systems   Constitutional: Negative for fever. HENT: Positive for congestion (mild). Eyes: Negative. Respiratory: Negative for cough and shortness of breath. Cardiovascular: Negative for chest pain and leg swelling. Gastrointestinal: Negative for abdominal pain, constipation and diarrhea. See hpi   Genitourinary: Negative for frequency and urgency. Musculoskeletal: Positive for arthralgias (knee pain, going to PT). Skin: Negative. Neurological: Negative for dizziness and headaches. Psychiatric/Behavioral: The patient is not nervous/anxious.         PAST MEDICAL HISTORY    Past Medical History:   Diagnosis Date    Allergic rhinitis     Chronic fatigue     Combined arterial insufficiency and corporo-venous occlusive erectile dysfunction     Excessive daytime sleepiness     Hypothyroidism     Dr. Paulino Patches Mixed hyperlipidemia     Snoring        FAMILY HISTORY    Family History   Problem Relation Age of Onset    Heart Disease Mother     Pacemaker Mother     Thyroid Disease Mother     Cancer Father     Stroke Father     Lung Cancer Father     Brain Cancer Father     High Blood Pressure Sister     Thyroid Disease Brother     Thyroid Disease Brother        SOCIAL HISTORY    Social History     Socioeconomic History    Marital status:      Spouse name: None    Number of children: None    Years of education: None    Highest education level: None   Occupational History    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, 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[place], [facility]   Social Needs    Financial resource strain: None    Food insecurity:     Worry: None     Inability: None    Transportation needs:     Medical: None     Non-medical: None   Tobacco Use    Smoking status: Never Smoker    Smokeless tobacco: Never Used   Substance and Sexual Activity    Alcohol use: Yes     Comment: 1 or 2 beers a week    Drug use: No    Sexual activity: Yes     Partners: Female   Lifestyle    Physical activity:     Days per week: None     Minutes per session: None    Stress: None   Relationships    Social connections:     Talks on phone: None     Gets together: None     Attends Confucianism service: None     Active member of club or organization: None     Attends meetings of clubs or organizations: None     Relationship status: None    Intimate partner violence:     Fear of current or ex partner: None     Emotionally abused: None     Physically abused: None     Forced sexual activity: None   Other Topics Concern    None   Social History Narrative    None       SURGICAL HISTORY    No past surgical history on file. CURRENT MEDICATIONS    Current Outpatient Medications   Medication Sig Dispense Refill    montelukast (SINGULAIR) 10 MG tablet TAKE ONE TABLET BY MOUTH ONCE NIGHTLY 30 tablet 5    Cetirizine HCl (ZYRTEC ALLERGY) 10 MG CAPS 1 tablet      azelastine (ASTELIN) 0.1 % nasal spray 2 sprays by Nasal route 2 times daily 2 Spray in each nostril 4 Bottle 1    levothyroxine (SYNTHROID) 112 MCG tablet Take 112 mcg by mouth Daily      VIAGRA 100 MG tablet Take 1 tablet by mouth as needed for Erectile Dysfunction 10 tablet 3     No current facility-administered medications for this visit.         ALLERGIES    Allergies   Allergen Reactions    Codeine     Requip [Ropinirole Hcl] PHYSICAL EXAM   Physical Exam   Constitutional: He is oriented to person, place, and time. He appears well-developed and well-nourished. HENT:   Head: Normocephalic. Eyes: Pupils are equal, round, and reactive to light. Neck: Normal range of motion. Cardiovascular: Normal rate and regular rhythm. Pulmonary/Chest: Effort normal and breath sounds normal.   Abdominal: Soft. Neurological: He is alert and oriented to person, place, and time. Vitals reviewed. Assessment/PLan  1. External hemorrhoid  Chronic. Wants to see specialist.   - External Referral To Colorectal Surgery      Twan Bar received counseling on the following healthy behaviors: nutrition and exercise  Reviewed prior labs and health maintenance. Continue current medications, diet and exercise. Discussed use, benefit, and side effects of prescribed medications. Barriers to medication compliance addressed. Patient given educational materials - see patient instructions. All patient questions answered. Patient voiced understanding. Return if symptoms worsen or fail to improve.         Electronically signed by Andrew Junior MD on 5/1/19 at 10:00 AM

## 2019-05-01 NOTE — PROGRESS NOTES
Visit Information    Have you changed or started any medications since your last visit including any over-the-counter medicines, vitamins, or herbal medicines? no   Have you stopped taking any of your medications? Is so, why? -  no  Are you having any side effects from any of your medications? - no    Have you seen any other physician or provider since your last visit?  no   Have you had any other diagnostic tests since your last visit?  no   Have you been seen in the emergency room and/or had an admission in a hospital since we last saw you?  no   Have you had your routine dental cleaning in the past 6 months?  no     Do you have an active MyChart account? If no, what is the barrier?   Yes    Patient Care Team:  Ivette Driver MD as PCP - General (Family Medicine)    Medical History Review  Past Medical, Family, and Social History reviewed and does contribute to the patient presenting condition    Health Maintenance   Topic Date Due    TSH testing  1965    Shingles Vaccine (1 of 2) 09/07/2016    Colon cancer screen colonoscopy  09/07/2016    DTaP/Tdap/Td vaccine (1 - Tdap) 09/16/2019 (Originally 9/7/1985)    Flu vaccine (Season Ended) 09/01/2019    Lipid screen  02/26/2020    HIV screen  Completed    Pneumococcal 0-64 years Vaccine  Aged Out

## 2019-05-03 ENCOUNTER — TELEPHONE (OUTPATIENT)
Dept: FAMILY MEDICINE CLINIC | Age: 54
End: 2019-05-03

## 2019-05-03 DIAGNOSIS — K64.4 EXTERNAL HEMORRHOID: ICD-10-CM

## 2019-05-03 DIAGNOSIS — E03.9 ACQUIRED HYPOTHYROIDISM: Primary | ICD-10-CM

## 2019-05-03 NOTE — TELEPHONE ENCOUNTER
Patient stated that Dr Madhuri De Paz is not in his network so he would like a referral to Xochilt Morales for the hemorrhoids and he also needs a new referral for endocrine doctor Sharyle Nim as he no longer sees his other endocrine doctor for his thyroid, if so referrals are pended    Health Maintenance   Topic Date Due    TSH testing  1965    Shingles Vaccine (1 of 2) 09/07/2016    Colon cancer screen colonoscopy  09/07/2016    DTaP/Tdap/Td vaccine (1 - Tdap) 09/16/2019 (Originally 9/7/1985)    Flu vaccine (Season Ended) 09/01/2019    Lipid screen  02/26/2020    HIV screen  Completed    Pneumococcal 0-64 years Vaccine  Aged Out             (applicable per patient's age: Cancer Screenings, Depression Screening, Fall Risk Screening, Immunizations)    No results found for: LABA1C, LABMICR, LDLCHOLESTEROL, LDLCALC, AST, ALT, BUN   (goal A1C is < 7)   (goal LDL is <100) need 30-50% reduction from baseline     BP Readings from Last 3 Encounters:   05/01/19 118/80   04/24/19 130/79   04/02/19 126/84    (goal /80)      All Future Testing planned in [facility]:  Lab Frequency Next Occurrence   Comprehensive Metabolic Panel Once 14/29/7910   CBC Auto Differential Once 05/17/2019   Lipid Panel Once 05/17/2019   Sleep Study with PAP Titration Once 04/24/2019   Baseline Diagnostic Sleep Study Once 04/24/2019       Next Visit Date:  Future Appointments   Date Time Provider Julio Santana   5/19/2019  7:30 PM [facility] RM 2 [facility] None   5/23/2019  3:30 PM Rufina Briones, PT [facility]   6/20/2019  3:30 PM [employer], PT [facility]   7/18/2019  3:30 PM Rufina Sinrenia, PT [facility]   7/24/2019  3:00 PM SCHEDULE, RESP [facility]            Patient Active Problem List:     Allergic rhinitis     Hypothyroidism

## 2019-05-04 ENCOUNTER — HOSPITAL ENCOUNTER (OUTPATIENT)
Facility: CLINIC | Age: 54
Discharge: HOME | End: 2019-05-04
Payer: OTHER GOVERNMENT

## 2019-05-04 DIAGNOSIS — E78.2 MIXED HYPERLIPIDEMIA: ICD-10-CM

## 2019-05-04 DIAGNOSIS — R53.82 CHRONIC FATIGUE: ICD-10-CM

## 2019-05-04 LAB
ABSOLUTE EOS #: 0.15 K/UL (ref 0–0.44)
ABSOLUTE IMMATURE GRANULOCYTE: <0.03 K/UL (ref 0–0.3)
ABSOLUTE LYMPH #: 1.06 K/UL (ref 1.1–3.7)
ABSOLUTE MONO #: 0.46 K/UL (ref 0.1–1.2)
ALBUMIN SERPL-MCNC: 4.4 G/DL (ref 3.5–5.2)
ALBUMIN/GLOBULIN RATIO: 1.6 (ref 1–2.5)
ALP BLD-CCNC: 54 U/L (ref 40–129)
ALT SERPL-CCNC: 24 U/L (ref 5–41)
ANION GAP SERPL CALCULATED.3IONS-SCNC: 8 MMOL/L (ref 9–17)
AST SERPL-CCNC: 24 U/L
BASOPHILS # BLD: 1 % (ref 0–2)
BASOPHILS ABSOLUTE: 0.05 K/UL (ref 0–0.2)
BILIRUB SERPL-MCNC: 0.87 MG/DL (ref 0.3–1.2)
BUN BLDV-MCNC: 16 MG/DL (ref 6–20)
BUN/CREAT BLD: ABNORMAL (ref 9–20)
CALCIUM SERPL-MCNC: 9 MG/DL (ref 8.6–10.4)
CHLORIDE BLD-SCNC: 103 MMOL/L (ref 98–107)
CHOLESTEROL/HDL RATIO: 3.5
CHOLESTEROL: 197 MG/DL
CO2: 27 MMOL/L (ref 20–31)
CREAT SERPL-MCNC: 0.91 MG/DL (ref 0.7–1.2)
DIFFERENTIAL TYPE: ABNORMAL
EOSINOPHILS RELATIVE PERCENT: 4 % (ref 1–4)
GFR AFRICAN AMERICAN: >60 ML/MIN
GFR NON-AFRICAN AMERICAN: >60 ML/MIN
GFR SERPL CREATININE-BSD FRML MDRD: ABNORMAL ML/MIN/{1.73_M2}
GFR SERPL CREATININE-BSD FRML MDRD: ABNORMAL ML/MIN/{1.73_M2}
GLUCOSE BLD-MCNC: 99 MG/DL (ref 70–99)
HCT VFR BLD CALC: 44.7 % (ref 40.7–50.3)
HDLC SERPL-MCNC: 56 MG/DL
HEMOGLOBIN: 15.2 G/DL (ref 13–17)
IMMATURE GRANULOCYTES: 0 %
LDL CHOLESTEROL: 125 MG/DL (ref 0–130)
LYMPHOCYTES # BLD: 28 % (ref 24–43)
MCH RBC QN AUTO: 30.3 PG (ref 25.2–33.5)
MCHC RBC AUTO-ENTMCNC: 34 G/DL (ref 28.4–34.8)
MCV RBC AUTO: 89.2 FL (ref 82.6–102.9)
MONOCYTES # BLD: 12 % (ref 3–12)
NRBC AUTOMATED: 0 PER 100 WBC
PDW BLD-RTO: 12.6 % (ref 11.8–14.4)
PLATELET # BLD: 198 K/UL (ref 138–453)
PLATELET ESTIMATE: ABNORMAL
PMV BLD AUTO: 10 FL (ref 8.1–13.5)
POTASSIUM SERPL-SCNC: 4.8 MMOL/L (ref 3.7–5.3)
RBC # BLD: 5.01 M/UL (ref 4.21–5.77)
RBC # BLD: ABNORMAL 10*6/UL
SEG NEUTROPHILS: 55 % (ref 36–65)
SEGMENTED NEUTROPHILS ABSOLUTE COUNT: 2.05 K/UL (ref 1.5–8.1)
SODIUM BLD-SCNC: 138 MMOL/L (ref 135–144)
TOTAL PROTEIN: 7.1 G/DL (ref 6.4–8.3)
TRIGL SERPL-MCNC: 80 MG/DL
VLDLC SERPL CALC-MCNC: NORMAL MG/DL (ref 1–30)
WBC # BLD: 3.8 K/UL (ref 3.5–11.3)
WBC # BLD: ABNORMAL 10*3/UL

## 2019-05-04 PROCEDURE — 85025 COMPLETE CBC W/AUTO DIFF WBC: CPT

## 2019-05-04 PROCEDURE — 80053 COMPREHEN METABOLIC PANEL: CPT

## 2019-05-04 PROCEDURE — 36415 COLL VENOUS BLD VENIPUNCTURE: CPT

## 2019-05-04 PROCEDURE — 80061 LIPID PANEL: CPT

## 2019-05-06 RX ORDER — LEVOTHYROXINE SODIUM 112 UG/1
112 TABLET ORAL DAILY
Qty: 30 TABLET | Refills: 2 | Status: SHIPPED | OUTPATIENT
Start: 2019-05-06 | End: 2019-05-08

## 2019-05-08 RX ORDER — LEVOTHYROXINE SODIUM 0.12 MG/1
125 TABLET ORAL DAILY
Qty: 30 TABLET | Refills: 5 | Status: SHIPPED | OUTPATIENT
Start: 2019-05-08 | End: 2019-11-01 | Stop reason: SDUPTHER

## 2019-05-08 NOTE — TELEPHONE ENCOUNTER
Patient asked us to send in his thyroid med last week but it was the wrong dose,  Correct dose pended. Angela Goss

## 2019-05-19 ENCOUNTER — HOSPITAL ENCOUNTER (OUTPATIENT)
Dept: SLEEP CENTER | Age: 54
Discharge: HOME | End: 2019-05-21
Payer: OTHER GOVERNMENT

## 2019-05-19 VITALS — WEIGHT: 160 LBS | BODY MASS INDEX: 24.25 KG/M2 | HEIGHT: 68 IN

## 2019-05-19 DIAGNOSIS — G47.30 SLEEP APNEA, UNSPECIFIED TYPE: ICD-10-CM

## 2019-05-19 PROCEDURE — 95810 POLYSOM 6/> YRS 4/> PARAM: CPT

## 2019-05-19 ASSESSMENT — SLEEP AND FATIGUE QUESTIONNAIRES
HOW LIKELY ARE YOU TO NOD OFF OR FALL ASLEEP WHEN YOU ARE A PASSENGER IN A CAR FOR AN HOUR WITHOUT A BREAK: 1
HOW LIKELY ARE YOU TO NOD OFF OR FALL ASLEEP IN A CAR, WHILE STOPPED FOR A FEW MINUTES IN TRAFFIC: 0
HOW LIKELY ARE YOU TO NOD OFF OR FALL ASLEEP WHILE LYING DOWN TO REST IN THE AFTERNOON WHEN CIRCUMSTANCES PERMIT: 2
HOW LIKELY ARE YOU TO NOD OFF OR FALL ASLEEP WHILE SITTING AND READING: 1
HOW LIKELY ARE YOU TO NOD OFF OR FALL ASLEEP WHILE SITTING QUIETLY AFTER LUNCH WITHOUT ALCOHOL: 0
HOW LIKELY ARE YOU TO NOD OFF OR FALL ASLEEP WHILE WATCHING TV: 2
HOW LIKELY ARE YOU TO NOD OFF OR FALL ASLEEP WHILE SITTING AND TALKING TO SOMEONE: 0
ESS TOTAL SCORE: 6
HOW LIKELY ARE YOU TO NOD OFF OR FALL ASLEEP WHILE SITTING INACTIVE IN A PUBLIC PLACE: 0

## 2019-05-23 ENCOUNTER — HOSPITAL ENCOUNTER (OUTPATIENT)
Dept: PHYSICAL THERAPY | Facility: CLINIC | Age: 54
Setting detail: THERAPIES SERIES
Discharge: HOME | End: 2019-05-23
Payer: OTHER GOVERNMENT

## 2019-05-23 PROCEDURE — 97530 THERAPEUTIC ACTIVITIES: CPT

## 2019-05-23 PROCEDURE — 97110 THERAPEUTIC EXERCISES: CPT

## 2019-05-23 NOTE — FLOWSHEET NOTE
[] [facility]       Outpatient Physical        Therapy       [address]       Phone: [phone]       Fax: [phone] [x] [facility] [address] [facility]       Phone: [phone]       Fax: [phone] [] [facility]  [address] [facility]   Phone: [phone]   Fax:  [phone]     Physical Therapy Daily Treatment Note    Date:  2019  Patient Name:  Butch Sena    :  1965  MRN: [id number]  Physician: Lazarus Police, MD           Insurance: [facility] 18-  Medical Diagnosis: M25.561 (ICD-10-CM) - Acute pain of right knee                      Rehab Codes: M25.461, M25.561, M25.661, R29.3, R26.2NEC, M62.551, M62.81  Onset date: 18                  Next Dr's appt.: unknown   Visit# / total visits:     Cancels/No Shows: 1/0    Subjective:    Pain:  [] Yes  [x] No Location: R knee Pain Rating: (0-10 scale) 0/10  Pain altered Tx:  [x] No  [] Yes  Action:  Comments: Pt reports last week he had night time, annual  training in the woods with a 45lb weighted back pack and noticed pain in R knee, as a result of having to patrol area on unleveled surfaces. Otherwise, he notes no major issue or significant concerns. Objective:  Modalities:   Precautions:  Exercises: RLE - bolded completed  Exercise Reps/ Time Weight/ Level Comments   TM  5' 4.5mph    Gastroc stretch 2x30\"               HS Stretch/ITB Stretch/Prone Flexor stretch  2x30\"   On step 12\" & with belt; belt added ; Added ITB stretch & Prone Hip Flexor 19   SAQ'S       SLR 10x2 3# Weight added ; weight increased    SLR with ER 20x 4# Weight added ; weight increased    BRIDGES 10x2 Black  Band progressed ;  on bench for increased excursion   SL BRIDGES 10x2  Added ;  on bench for increased excursion   BRIDGES on ball  10x2 green ball Added ;  Increased reps ROM: R knee flexion & extension; lacking 2 degrees extension; flexion 130 deg (lacking 18 degrees flexion, as compared to L knee at 148 degrees)  [x] ? Function: LEFS score = 74/80 = 7.5% deficit                      [x] ? Balance:  RSLS 20sec with trendelenburg sign & R knee dynamic valgus  [x] Edema: Midpatellar girth R 35.5cm; L 35cm; (+) R Knee Effusion testing (ballotable patella)  [x] Postural Deviations: B Lower crossed syndrome  [x] Gait Deviations: R Medial heel whip with mild decreased R heel toe progression, R toe out gait pattern  [x] Other:  (+) R Knee Ligamentous Testing: Lachman's, Anterior Drawer, Valgus @0deg; (+) R knee Medial Meniscal testing: Etta's, Thessaly's, Tender to palpation at R Knee Medial Joint Line; (+)Flexibility tests: B Hamstring 90/90, B Ely's; (+) R knee Patellar/ Femoral Grind test              STG: (to be met in 7 treatments) As of 1/28/19  1. ? R Knee Pain: to <3/10 for ease with ADLs. MET, pt consistently with low pain levels &/or pain-free  2. ? R Knee ROM: by 2 degrees for extension & 5-10 degrees for flexion to improve overall functional mobility. Partially MET, Flexion 156deg, Ext lacking 1 degree  3. ? RLE Strength: emphasis on R knee, R hip & R gluteals, by 1/2 to a full grade to improve gait & standing tolerance. MET, see objective above  4. Independent with Home Exercise Programs MET  5. ? Balance: Pt to demonstrate RLE SLS of 25sec or greater, without compensations, for improved overall static & dynamic balance. Partially MET, R SLS 30s, but mild trendelenburg   6. Pt to be able to perform 10 squats without present of R dynamic valgus, for better squat mechanics. MET, able to self-correct       LTG: (to be met in 14 treatments) As of 2/21/19  1. ? R Knee Pain: to <1-2/10 for ease with ADLs. MET, currently denies pain. 2. Pt to demo 0% impairment on LEFS to improve overall functional mobility. MET, 0% deficit.   3. ? RLE Strength: emphasis on R knee, R hip, R gluteals, to WNLs to improve gait & standing tolerance. Partially MET, see objective above. Pt made strength improvements, but still lacks in R hamstring strength and R hip/gluteal musculature. 4. ? Balance: Pt to demonstrate RLE SLS of 30sec or greater, without compensations, for improved overall static & dynamic balance. NOT MET, pt demostrated R SLS of 24 sec, regressing from last assessment, he also continues trendelenburg sign & increased ankle strategies to perform. 5. Pt to demo improved R HS length by testing <20deg for 90/90 HS length test to improve overall HS flexibility. NOT MET, pt measured at 29degrees   6. Pt to be able to navigate full flight of stairs independently, without increase in s/s, to improve stair navigation. MET  7. Pt to be able to return to running hobby of 6 miles per week without increase in pain & with good running technique. NOT MET, pt reports no longer running, due to decreased knee stability.       Patient goals: \"removal of pain & to be able to begin strength training program\"    Pt. Education:  [x] Yes  [] No  [x] Reviewed Prior HEP/Ed  Method of Education: [x] Verbal  [x] Demo for charted ex [] Written  Comprehension of Education:  [x] Verbalizes understanding. [x] Demonstrates understanding. [] Needs review. [x] Demonstrates/verbalizes HEP/Ed previously given. 2/14: Various Bridges  & S/L hip ABD exercises    Plan: [x] Continue per plan of care.  19vs   [x] Other:       Time In: 325pm            Time Out: 430pm    Electronically signed by:  Libby Holly PT

## 2019-06-07 LAB — STATUS: NORMAL

## 2019-06-18 ENCOUNTER — HOSPITAL ENCOUNTER (OUTPATIENT)
Dept: SLEEP CENTER | Age: 54
Discharge: HOME | End: 2019-06-20
Payer: OTHER GOVERNMENT

## 2019-06-18 DIAGNOSIS — G47.30 SLEEP APNEA, UNSPECIFIED TYPE: ICD-10-CM

## 2019-06-18 PROCEDURE — 95811 POLYSOM 6/>YRS CPAP 4/> PARM: CPT

## 2019-07-11 LAB — STATUS: NORMAL

## 2019-07-18 ENCOUNTER — HOSPITAL ENCOUNTER (OUTPATIENT)
Dept: PHYSICAL THERAPY | Facility: CLINIC | Age: 54
Setting detail: THERAPIES SERIES
Discharge: HOME | End: 2019-07-18
Payer: OTHER GOVERNMENT

## 2019-07-18 PROCEDURE — 97530 THERAPEUTIC ACTIVITIES: CPT

## 2019-07-18 PROCEDURE — 97110 THERAPEUTIC EXERCISES: CPT

## 2019-07-23 ENCOUNTER — TELEPHONE (OUTPATIENT)
Dept: PULMONOLOGY | Age: 54
End: 2019-07-23

## 2019-08-22 ENCOUNTER — HOSPITAL ENCOUNTER (OUTPATIENT)
Dept: PHYSICAL THERAPY | Facility: CLINIC | Age: 54
Setting detail: THERAPIES SERIES
Discharge: HOME | End: 2019-08-22
Payer: OTHER GOVERNMENT

## 2019-08-22 PROCEDURE — 97110 THERAPEUTIC EXERCISES: CPT

## 2019-09-20 ENCOUNTER — OFFICE VISIT (OUTPATIENT)
Dept: PULMONOLOGY | Age: 54
End: 2019-09-20
Payer: OTHER GOVERNMENT

## 2019-09-20 VITALS
DIASTOLIC BLOOD PRESSURE: 82 MMHG | BODY MASS INDEX: 25.04 KG/M2 | RESPIRATION RATE: 12 BRPM | HEIGHT: 68 IN | HEART RATE: 62 BPM | WEIGHT: 165.2 LBS | OXYGEN SATURATION: 95 % | SYSTOLIC BLOOD PRESSURE: 128 MMHG

## 2019-09-20 DIAGNOSIS — Z99.89 OSA ON CPAP: Primary | ICD-10-CM

## 2019-09-20 DIAGNOSIS — G47.26 CIRCADIAN RHYTHM SLEEP DISORDER, SHIFT WORK TYPE: ICD-10-CM

## 2019-09-20 DIAGNOSIS — G47.33 OSA ON CPAP: Primary | ICD-10-CM

## 2019-09-20 PROCEDURE — 99213 OFFICE O/P EST LOW 20 MIN: CPT | Performed by: INTERNAL MEDICINE

## 2019-09-20 ASSESSMENT — SLEEP AND FATIGUE QUESTIONNAIRES
HOW LIKELY ARE YOU TO NOD OFF OR FALL ASLEEP WHILE SITTING INACTIVE IN A PUBLIC PLACE: 0
ESS TOTAL SCORE: 1
HOW LIKELY ARE YOU TO NOD OFF OR FALL ASLEEP WHILE SITTING AND TALKING TO SOMEONE: 0
HOW LIKELY ARE YOU TO NOD OFF OR FALL ASLEEP IN A CAR, WHILE STOPPED FOR A FEW MINUTES IN TRAFFIC: 0
HOW LIKELY ARE YOU TO NOD OFF OR FALL ASLEEP WHILE LYING DOWN TO REST IN THE AFTERNOON WHEN CIRCUMSTANCES PERMIT: 1
HOW LIKELY ARE YOU TO NOD OFF OR FALL ASLEEP WHILE SITTING AND READING: 0
HOW LIKELY ARE YOU TO NOD OFF OR FALL ASLEEP WHILE SITTING QUIETLY AFTER LUNCH WITHOUT ALCOHOL: 0
HOW LIKELY ARE YOU TO NOD OFF OR FALL ASLEEP WHILE WATCHING TV: 0
HOW LIKELY ARE YOU TO NOD OFF OR FALL ASLEEP WHEN YOU ARE A PASSENGER IN A CAR FOR AN HOUR WITHOUT A BREAK: 0

## 2019-09-20 ASSESSMENT — ENCOUNTER SYMPTOMS
EYES NEGATIVE: 1
RESPIRATORY NEGATIVE: 1

## 2019-10-02 RX ORDER — MONTELUKAST SODIUM 10 MG/1
TABLET ORAL
Qty: 30 TABLET | Refills: 4 | Status: SHIPPED | OUTPATIENT
Start: 2019-10-02 | End: 2020-02-26

## 2020-02-26 RX ORDER — MONTELUKAST SODIUM 10 MG/1
TABLET ORAL
Qty: 30 TABLET | Refills: 3 | Status: SHIPPED | OUTPATIENT
Start: 2020-02-26 | End: 2020-06-21

## 2020-04-26 RX ORDER — LEVOTHYROXINE SODIUM 0.12 MG/1
TABLET ORAL
Qty: 30 TABLET | Refills: 0 | Status: SHIPPED | OUTPATIENT
Start: 2020-04-26 | End: 2020-05-26

## 2020-07-29 ENCOUNTER — TELEMEDICINE (OUTPATIENT)
Dept: FAMILY MEDICINE CLINIC | Age: 55
End: 2020-07-29
Payer: OTHER GOVERNMENT

## 2020-07-29 PROBLEM — H52.229 REGULAR ASTIGMATISM: Status: ACTIVE | Noted: 2020-07-29

## 2020-07-29 PROBLEM — J30.9 ALLERGIC RHINITIS: Status: ACTIVE | Noted: 2020-07-29

## 2020-07-29 PROCEDURE — 99213 OFFICE O/P EST LOW 20 MIN: CPT | Performed by: FAMILY MEDICINE

## 2020-07-29 RX ORDER — AZELASTINE 1 MG/ML
2 SPRAY, METERED NASAL 2 TIMES DAILY
Qty: 4 BOTTLE | Refills: 1 | Status: SHIPPED | OUTPATIENT
Start: 2020-07-29 | End: 2021-08-12 | Stop reason: SDUPTHER

## 2020-07-29 ASSESSMENT — PATIENT HEALTH QUESTIONNAIRE - PHQ9
SUM OF ALL RESPONSES TO PHQ9 QUESTIONS 1 & 2: 0
SUM OF ALL RESPONSES TO PHQ QUESTIONS 1-9: 0
2. FEELING DOWN, DEPRESSED OR HOPELESS: 0
1. LITTLE INTEREST OR PLEASURE IN DOING THINGS: 0
SUM OF ALL RESPONSES TO PHQ QUESTIONS 1-9: 0

## 2020-07-29 ASSESSMENT — ENCOUNTER SYMPTOMS
COUGH: 0
CONSTIPATION: 0
DIARRHEA: 0
ALLERGIC/IMMUNOLOGIC NEGATIVE: 1
ABDOMINAL PAIN: 0
EYES NEGATIVE: 1
SHORTNESS OF BREATH: 0
BLOOD IN STOOL: 0

## 2020-07-29 NOTE — PROGRESS NOTES
[facility]  [facility]  [address]  [facility] [address]  Dept: [phone]    2020    TELEHEALTH EVALUATION -- Audio/Visual (During VGJWT-82 public health emergency)  Pedro Garcia (:  1965) has requested an audio/video evaluation for the following concern(s):    HPI:  Video visit to recheck chronic conditions and meds. Taking medications as prescribed for thyroid and allergies with no side effects and good effectiveness. Sees endocrinologist for thyroid labs. Working on [employer]. There have been covid cases among the staff. He has not had any sx. Would like to do colon screening. joe says he needs dtap but he believes he has had one at work in the past 10 years. There were no vitals filed for this visit. REVIEW OF SYSTEMS:   Review of Systems   Constitutional: Negative for fatigue, fever and unexpected weight change. HENT: Negative. Eyes: Negative. Respiratory: Negative for cough and shortness of breath. Cardiovascular: Negative for chest pain and leg swelling. Gastrointestinal: Negative for abdominal pain, blood in stool, constipation and diarrhea. Endocrine: Negative. Genitourinary: Negative for frequency and urgency. Musculoskeletal: Negative. Skin: Negative. Allergic/Immunologic: Negative. Neurological: Negative for dizziness and headaches. Hematological: Negative. Psychiatric/Behavioral: Negative for sleep disturbance. The patient is not nervous/anxious.         PAST MEDICAL HISTORY:    Past Medical History:   Diagnosis Date    Allergic rhinitis     Chronic fatigue     Combined arterial insufficiency and corporo-venous occlusive erectile dysfunction     Excessive daytime sleepiness     Hypothyroidism     Dr. Estefany Suárez Mixed hyperlipidemia     Snoring        FAMILY HISTORY:    Family History   Problem Relation Age of Onset    Heart Disease Mother     Pacemaker TAKE ONE TABLET BY MOUTH DAILY 30 tablet 5    Cetirizine HCl (ZYRTEC ALLERGY) 10 MG CAPS 1 tablet      VIAGRA 100 MG tablet Take 1 tablet by mouth as needed for Erectile Dysfunction (Patient not taking: Reported on 9/20/2019) 10 tablet 3     No current facility-administered medications for this visit. ALLERGIES:   Allergies   Allergen Reactions    Codeine     Requip [Ropinirole Hcl]        PHYSICAL EXAM:   Physical Exam  Vitals signs reviewed. Constitutional:       Appearance: Normal appearance. HENT:      Head: Normocephalic. Eyes:      Extraocular Movements: Extraocular movements intact. Conjunctiva/sclera: Conjunctivae normal.   Neck:      Musculoskeletal: Normal range of motion. Pulmonary:      Effort: Pulmonary effort is normal.   Musculoskeletal: Normal range of motion. Neurological:      General: No focal deficit present. Mental Status: He is alert and oriented to person, place, and time. Mental status is at baseline. Psychiatric:         Mood and Affect: Mood normal.         Behavior: Behavior normal.         Thought Content: Thought content normal.         Judgment: Judgment normal.          ASSESSMENT/PLAN:  1. Non-seasonal allergic rhinitis, unspecified trigger  Chronic, stable, continue current medication and/or plan    - azelastine (ASTELIN) 0.1 % nasal spray; 2 sprays by Nasal route 2 times daily 2 Spray in each nostril  Dispense: 4 Bottle; Refill: 1    2. Acquired hypothyroidism  Chronic, stable, continue current medication and/or plan  Cont seeing endo    3. Screen for colon cancer    - Cologuard; Future    4. Screening cholesterol level    - Lipid Panel; Future    5. Screening for endocrine, metabolic and immunity disorder    - CBC Auto Differential; Future  - Comprehensive Metabolic Panel; Future      Return in about 6 months (around 1/29/2021), or if symptoms worsen or fail to improve.     Patria Olsen is a 49 y.o. male being evaluated by a Virtual Visit (video visit) encounter to address concerns as mentioned above. A caregiver was present when appropriate. Due to this being a TeleHealth encounter (During [facility] public health emergency), evaluation of the following organ systems was limited: Vitals/Constitutional/EENT/Resp/CV/GI//MS/Neuro/Skin/Heme-Lymph-Imm. Pursuant to the emergency declaration under the [address] [place] [address] and the [employer] Act, this Virtual Visit was conducted with patient's (and/or legal guardian's) consent, to reduce the patient's risk of exposure to COVID-19 and provide necessary medical care. The patient (and/or legal guardian) has also been advised to contact this office for worsening conditions or problems, and seek emergency medical treatment and/or call 911 if deemed necessary. Services were provided through a video synchronous discussion virtually to substitute for in-person clinic visit. Patient and provider were located at their individual homes. --Indu Mackay MD on 7/29/2020 at 8:18 AM    An electronic signature was used to authenticate this note.

## 2020-09-25 ENCOUNTER — OFFICE VISIT (OUTPATIENT)
Dept: PULMONOLOGY | Age: 55
End: 2020-09-25
Payer: OTHER GOVERNMENT

## 2020-09-25 VITALS
SYSTOLIC BLOOD PRESSURE: 126 MMHG | BODY MASS INDEX: 24.4 KG/M2 | HEIGHT: 68 IN | TEMPERATURE: 98.6 F | OXYGEN SATURATION: 97 % | DIASTOLIC BLOOD PRESSURE: 79 MMHG | HEART RATE: 58 BPM | RESPIRATION RATE: 12 BRPM | WEIGHT: 161 LBS

## 2020-09-25 PROCEDURE — 99213 OFFICE O/P EST LOW 20 MIN: CPT | Performed by: INTERNAL MEDICINE

## 2020-09-25 ASSESSMENT — ENCOUNTER SYMPTOMS
EYES NEGATIVE: 1
RESPIRATORY NEGATIVE: 1
RHINORRHEA: 1

## 2020-09-26 NOTE — PROGRESS NOTES
Subjective:      Patient ID: Sergey Caputo is a 48 y.o. male. HPI  Patient returns for follow up of sleep apnea. Since last visit 12 months ago, patient has been very compliant with CPAP. Uses every night, for the entire night. Reports refreshing and restorative sleep. Denies snoring. Reports normal daytime alertness. Believes benefiting greatly. Compliance download from 6/19/20 to 9/13/20 shows 99% compliance for average of 5.1hrs per night. Residual AHI 1.3. CPAP pressure7 cm H2O. I using montelukast, Zyrtec, and Flonase. D over the last 6 weeks that his CPAP use is less. He ascribes this to the fact that he is changed shifts. Also, seasonal allergies sometimes make it difficult for him to tolerate CPAP. Review of Systems   Constitutional: Negative. HENT: Positive for congestion and rhinorrhea. Eyes: Negative. Respiratory: Negative. Cardiovascular: Negative. All other systems reviewed and are negative. Objective:     Physical Exam  Vitals signs and nursing note reviewed. Constitutional:       Appearance: He is well-developed. HENT:      Nose: Congestion present. Mouth/Throat:      Mouth: Mucous membranes are moist.      Pharynx: No oropharyngeal exudate. Eyes:      General: No scleral icterus. Conjunctiva/sclera: Conjunctivae normal.   Neck:      Musculoskeletal: Neck supple. Thyroid: No thyromegaly. Vascular: No JVD. Trachea: No tracheal deviation. Cardiovascular:      Rate and Rhythm: Normal rate and regular rhythm. Heart sounds: Normal heart sounds. No murmur. No gallop. Pulmonary:      Effort: Pulmonary effort is normal. No respiratory distress. Breath sounds: No wheezing or rales. Chest:      Chest wall: No tenderness. Abdominal:      Palpations: Abdomen is soft. Tenderness: There is no abdominal tenderness. Lymphadenopathy:      Cervical: No cervical adenopathy. Skin:     General: Skin is warm and dry.    Neurological: Mental Status: He is alert and oriented to person, place, and time. Wt Readings from Last 3 Encounters:   09/25/20 161 lb (73 kg)   09/20/19 165 lb 3.2 oz (74.9 kg)   05/19/19 160 lb (72.6 kg)          Results for orders placed or performed during the hospital encounter of 06/18/19   Sleep Study with PAP Titration   Result Value Ref Range    Status Interpreted        Assessment:         1. ARDIENNE on CPAP    2. Circadian rhythm sleep disorder, shift work type    3. Seasonal allergies          Plan:      1. Continue CPAP. 2. Discussed principles of good sleep hygiene. 3. Avoid sedative hypnotics and alcohol at bedtime. 4. Discussed strategies for management of seasonal allergies. 5. Discussed strategies for mitigation risk COVID-19 infection. 6. Return in 1 year. 7. Encouraged flu shot.      Electronically signed by Sheila Smith DO on 9/25/2020at 10:08 PM

## 2020-10-09 ENCOUNTER — OFFICE VISIT (OUTPATIENT)
Dept: FAMILY MEDICINE CLINIC | Age: 55
End: 2020-10-09
Payer: OTHER GOVERNMENT

## 2020-10-09 VITALS
SYSTOLIC BLOOD PRESSURE: 120 MMHG | TEMPERATURE: 97.9 F | DIASTOLIC BLOOD PRESSURE: 80 MMHG | HEIGHT: 68 IN | WEIGHT: 165.8 LBS | BODY MASS INDEX: 25.13 KG/M2 | HEART RATE: 54 BPM | OXYGEN SATURATION: 97 %

## 2020-10-09 PROCEDURE — 99213 OFFICE O/P EST LOW 20 MIN: CPT | Performed by: FAMILY MEDICINE

## 2020-10-09 ASSESSMENT — ENCOUNTER SYMPTOMS
CONSTIPATION: 0
BLOOD IN STOOL: 0
ABDOMINAL PAIN: 0
COUGH: 0
ALLERGIC/IMMUNOLOGIC NEGATIVE: 1
DIARRHEA: 1
EYES NEGATIVE: 1
SHORTNESS OF BREATH: 0

## 2020-10-09 NOTE — PROGRESS NOTES
[facility]  [facility]  [address]  [facility] [address]  Dept: [phone]    10/9/2020    CHIEF COMPLAINT    Chief Complaint   Patient presents with    Shoulder Pain       HPI    Margarita Spear is a 48 y.o. male who presents   Chief Complaint   Patient presents with    Shoulder Pain   . Left shoulder pain for past 5 days when he first woke up. Improving over the past few days. Has had a change in bowels for the past 6 months, having loose stools daily. Today has had 2 loose stools today with only drinking water and taking meds. Occasionally has some urgency to have a bm. No blood or mucous in stool. Has not traveled overseas in the past year. Did do some training in [place] in February, does not recall having the loose stools at that time. Shoulder Pain    The pain is present in the left shoulder. This is a new problem. The current episode started in the past 7 days. The problem occurs daily. The problem has been gradually improving. The pain is mild. Pertinent negatives include no fever. He has tried rest for the symptoms. The treatment provided mild relief. Vitals:    10/09/20 1054   BP: 120/80   Site: Left Upper Arm   Position: Sitting   Cuff Size: Medium Adult   Pulse: 54   Temp: 97.9 °F (36.6 °C)   TempSrc: Temporal   SpO2: 97%   Weight: 165 lb 12.8 oz (75.2 kg)   Height: 5' 8\" (1.727 m)       REVIEW OF SYSTEMS    Review of Systems   Constitutional: Negative for fatigue, fever and unexpected weight change. HENT: Negative. Eyes: Negative. Respiratory: Negative for cough and shortness of breath. Cardiovascular: Negative for chest pain and leg swelling. Gastrointestinal: Positive for diarrhea (  sx for past 6 months). Negative for abdominal pain, blood in stool and constipation. Has a solid stool daily and then loose stools during the day. No change in diet. No blood or mucous in stool. Endocrine: Negative. Genitourinary: Negative for frequency and urgency. Musculoskeletal: Positive for arthralgias (left shoulder). Skin: Negative. Allergic/Immunologic: Negative. Neurological: Negative for dizziness and headaches. Hematological: Negative. Psychiatric/Behavioral: Negative for sleep disturbance. The patient is not nervous/anxious.         PAST MEDICAL HISTORY    Past Medical History:   Diagnosis Date    Allergic rhinitis     Chronic fatigue     Combined arterial insufficiency and corporo-venous occlusive erectile dysfunction     Excessive daytime sleepiness     Hypothyroidism     Dr. Mauro Wilkins Mixed hyperlipidemia     Snoring        FAMILY HISTORY    Family History   Problem Relation Age of Onset    Heart Disease Mother     Pacemaker Mother     Thyroid Disease Mother     Cancer Father     Stroke Father     Lung Cancer Father     Brain Cancer Father     High Blood Pressure Sister     Thyroid Disease Brother     Thyroid Disease Brother        SOCIAL HISTORY    Social History     Socioeconomic History    Marital status:      Spouse name: None    Number of children: None    Years of education: None    Highest education level: None   Occupational History    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, 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, [facility]   Social Needs    Financial resource strain: None    Food insecurity     Worry: None     Inability: None    Transportation needs     Medical: None     Non-medical: None   Tobacco Use    Smoking status: Never Smoker    Smokeless tobacco: Never Used   Substance and Sexual Activity    Alcohol use: Yes     Comment: 1 or 2 beers a week    Drug use: No    Sexual activity: Yes     Partners: Female   Lifestyle    Physical activity     Days per week: None     Minutes per session: None    Stress: None   Relationships    Social connections     Talks on phone: None     Gets together: None     Attends Gnosticist service: None     Active member of club or organization: None     Attends meetings of clubs or organizations: None     Relationship status: None    Intimate partner violence     Fear of current or ex partner: None     Emotionally abused: None     Physically abused: None     Forced sexual activity: None   Other Topics Concern    None   Social History Narrative    None       SURGICAL HISTORY    No past surgical history on file. CURRENT MEDICATIONS    Current Outpatient Medications   Medication Sig Dispense Refill    azelastine (ASTELIN) 0.1 % nasal spray 2 sprays by Nasal route 2 times daily 2 Spray in each nostril 4 Bottle 1    montelukast (SINGULAIR) 10 MG tablet TAKE ONE TABLET BY MOUTH ONCE NIGHTLY 30 tablet 5    levothyroxine (SYNTHROID) 125 MCG tablet TAKE ONE TABLET BY MOUTH DAILY 30 tablet 5    Cetirizine HCl (ZYRTEC ALLERGY) 10 MG CAPS 1 tablet       No current facility-administered medications for this visit. ALLERGIES    Allergies   Allergen Reactions    Codeine     Requip [Ropinirole Hcl]        PHYSICAL EXAM   Physical Exam  Vitals signs reviewed. Constitutional:       Appearance: He is well-developed. HENT:      Head: Normocephalic. Eyes:      Pupils: Pupils are equal, round, and reactive to light. Neck:      Musculoskeletal: Normal range of motion and neck supple. Thyroid: No thyromegaly. Cardiovascular:      Rate and Rhythm: Normal rate and regular rhythm. Heart sounds: Normal heart sounds. No murmur. Pulmonary:      Effort: Pulmonary effort is normal.      Breath sounds: Normal breath sounds. No wheezing or rales. Abdominal:      Palpations: Abdomen is soft. Tenderness: There is no abdominal tenderness. There is no guarding or rebound. Musculoskeletal: Normal range of motion. General: Tenderness (left shoulder anterior and posterior, worsens with rom. ) present. No deformity. Lymphadenopathy:      Cervical: No cervical adenopathy.    Skin:     General: Skin is warm and dry. Neurological:      Mental Status: He is alert and oriented to person, place, and time. Psychiatric:         Behavior: Behavior normal.         Assessment/PLan  1. Acute pain of left shoulder  Cont rom exercise, gentle activity. 2. Change in bowel function  Discussed seeing GI as opposed to doing cologuard test  - Eligio Lemus MD, Gastroenterology, [address]    3. Screen for colon cancer    - Eligio Lemus MD, [facility] received counseling on the following healthy behaviors: nutrition, exercise and medication adherence  Reviewed prior labs and health maintenance. Continue current medications, diet and exercise. Discussed use, benefit, and side effects of prescribed medications. Barriers to medication compliance addressed. Patient given educational materials - see patient instructions. All patient questions answered. Patient voiced understanding. Return if symptoms worsen or fail to improve.         Electronically signed by Jeevan Ronquillo MD on 10/9/20 at 11:06 AM EDT

## 2020-10-09 NOTE — PROGRESS NOTES
Pharmacy and medications reviewed. Patient stated left arm started hurting this week from shoulder down the arm. Tends to hurt badly in th morning and then ruben if trickle off throughout the day.     Pt states he has also gotten a lot landers loose bowel movements than usual.

## 2020-12-09 ENCOUNTER — TELEPHONE (OUTPATIENT)
Dept: GASTROENTEROLOGY | Age: 55
End: 2020-12-09

## 2020-12-20 RX ORDER — MONTELUKAST SODIUM 10 MG/1
TABLET ORAL
Qty: 30 TABLET | Refills: 4 | Status: SHIPPED | OUTPATIENT
Start: 2020-12-20 | End: 2021-05-16

## 2021-01-06 ENCOUNTER — TELEPHONE (OUTPATIENT)
Dept: FAMILY MEDICINE CLINIC | Age: 56
End: 2021-01-06

## 2021-01-26 ENCOUNTER — OFFICE VISIT (OUTPATIENT)
Dept: GASTROENTEROLOGY | Age: 56
End: 2021-01-26
Payer: OTHER GOVERNMENT

## 2021-01-26 VITALS
WEIGHT: 168.7 LBS | BODY MASS INDEX: 25.65 KG/M2 | SYSTOLIC BLOOD PRESSURE: 137 MMHG | TEMPERATURE: 97.4 F | DIASTOLIC BLOOD PRESSURE: 82 MMHG

## 2021-01-26 DIAGNOSIS — R19.7 LIQUID STOOL: ICD-10-CM

## 2021-01-26 DIAGNOSIS — Z12.11 SCREEN FOR COLON CANCER: ICD-10-CM

## 2021-01-26 DIAGNOSIS — R19.8 CHANGE IN BOWEL FUNCTION: Primary | ICD-10-CM

## 2021-01-26 DIAGNOSIS — R19.7 DIARRHEA, UNSPECIFIED TYPE: ICD-10-CM

## 2021-01-26 PROCEDURE — 99204 OFFICE O/P NEW MOD 45 MIN: CPT | Performed by: INTERNAL MEDICINE

## 2021-01-26 RX ORDER — SODIUM, POTASSIUM,MAG SULFATES 17.5-3.13G
SOLUTION, RECONSTITUTED, ORAL ORAL
Qty: 1 KIT | Refills: 0 | Status: SHIPPED | OUTPATIENT
Start: 2021-01-26 | End: 2021-04-26 | Stop reason: ALTCHOICE

## 2021-01-26 ASSESSMENT — ENCOUNTER SYMPTOMS
ABDOMINAL DISTENTION: 0
CONSTIPATION: 0
VOMITING: 0
RESPIRATORY NEGATIVE: 1
NAUSEA: 0
RECTAL PAIN: 0
BLOOD IN STOOL: 0
ABDOMINAL PAIN: 0
DIARRHEA: 1
ANAL BLEEDING: 0

## 2021-01-26 NOTE — PROGRESS NOTES
GI NEW PATIENT OFFICE VISIT    INTERVAL HISTORY:   MD Ayo Romero 80 [address],  [address] [facility]    Chief Complaint   Patient presents with    Diarrhea     NP referral, states diarrhea daily, never had a colonsocopy       1. Change in bowel function    2. Screen for colon cancer    3. Diarrhea, unspecified type    4. Liquid stool      This patient evaluated my office for the first time  He has history significant for chronic intermittent loose bowel movements  Denies any significant weight loss loss of appetite  Denies any rectal bleeding melanotic stools  He is due for his colonoscopy he says for screening purposes he never had colonoscopy done    Complains of mild abdominal bloating gas symptoms    Denies any significant upper GI symptoms including any GERD dysphagia nausea vomiting hematemesis    He has no history for smoking alcohol abuse illicit drug usage    No known family history for colon cancer    HISTORY OF PRESENT ILLNESS: Ravinder Elaine is a 48 y.o. male with a past history remarkable for , referred for evaluation of   Chief Complaint   Patient presents with    Diarrhea     NP referral, states diarrhea daily, never had a colonsocopy   . Past Medical,Family, and Social History reviewed and does contribute to the patient presenting condition. Patient's PMH/PSH,SH,PSYCH Hx, MEDs, ALLERGIES, and ROS were all reviewed and updated in the appropriate sections. PAST MEDICAL HISTORY:  Past Medical History:   Diagnosis Date    Allergic rhinitis     Change in bowel function     Chronic fatigue     Combined arterial insufficiency and corporo-venous occlusive erectile dysfunction     Excessive daytime sleepiness     Hypothyroidism     Dr. Raoul Kimball Mixed hyperlipidemia     Screen for colon cancer     Snoring        History reviewed. No pertinent surgical history. CURRENT MEDICATIONS:    Current Outpatient Medications:     montelukast (SINGULAIR) 10 MG tablet, TAKE ONE TABLET BY MOUTH ONCE NIGHTLY, Disp: 30 tablet, Rfl: 4    azelastine (ASTELIN) 0.1 % nasal spray, 2 sprays by Nasal route 2 times daily 2 Spray in each nostril, Disp: 4 Bottle, Rfl: 1    levothyroxine (SYNTHROID) 125 MCG tablet, TAKE ONE TABLET BY MOUTH DAILY, Disp: 30 tablet, Rfl: 5    Cetirizine HCl (ZYRTEC ALLERGY) 10 MG CAPS, 1 tablet, Disp: , Rfl:     Na Sulfate-K Sulfate-Mg Sulf (SUPREP BOWEL PREP KIT) 17.5-3.13-1.6 GM/177ML SOLN, Use as directed per instructions provided by physician office, Disp: 1 kit, Rfl: 0    ALLERGIES:   Allergies   Allergen Reactions    Codeine     Requip [Ropinirole Hcl]        FAMILY HISTORY:       Problem Relation Age of Onset    Heart Disease Mother     Pacemaker Mother     Thyroid Disease Mother     Cancer Father     Stroke Father     Lung Cancer Father     Brain Cancer Father     High Blood Pressure Sister     Thyroid Disease Brother     Thyroid Disease Brother          SOCIAL HISTORY:   Social History     Socioeconomic History    Marital status:      Spouse name: Not on file    Number of children: Not on file    Years of education: Not on file    Highest education level: Not on file   Occupational History    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, 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, [facility]   Social Needs    Financial resource strain: Not on file    Food insecurity     Worry: Not on file     Inability: Not on file   [employer] needs     Medical: Not on file     Non-medical: Not on file   Tobacco Use    Smoking status: Never Smoker    Smokeless tobacco: Never Used   Substance and Sexual Activity    Alcohol use: Yes     Comment: 1 or 2 beers a week    Drug use: No    Sexual activity: Yes     Partners: Female   Lifestyle    Physical activity     Days per week: Not on file     Minutes per session: Not on file    Stress: Not on file   Relationships    Social connections     Talks on phone: Not on file     Gets together: Not on file     Attends Moravian service: Not on file     Active member of club or organization: Not on file     Attends meetings of clubs or organizations: Not on file     Relationship status: Not on file    Intimate partner violence     Fear of current or ex partner: Not on file     Emotionally abused: Not on file     Physically abused: Not on file     Forced sexual activity: Not on file   Other Topics Concern    Not on file   Social History Narrative    Not on file         REVIEW OF SYSTEMS:         Review of Systems   HENT: Negative. Respiratory: Negative. Gastrointestinal: Positive for diarrhea (daily). Negative for abdominal distention, abdominal pain, anal bleeding, blood in stool, constipation, nausea, rectal pain and vomiting. PHYSICAL EXAMINATION: Vital signs reviewed per the nursing documentation. /82   Temp 97.4 °F (36.3 °C)   Wt 168 lb 11.2 oz (76.5 kg)   BMI 25.65 kg/m²   Body mass index is 25.65 kg/m². Physical Exam  Nursing note reviewed. Constitutional:       Appearance: He is well-developed. Comments: Anxious    HENT:      Head: Normocephalic and atraumatic. Eyes:      Conjunctiva/sclera: Conjunctivae normal.      Pupils: Pupils are equal, round, and reactive to light. Neck:      Musculoskeletal: Normal range of motion and neck supple. Cardiovascular:      Rate and Rhythm: Normal rate and regular rhythm. Pulmonary:      Effort: Pulmonary effort is normal.      Breath sounds: Normal breath sounds. Abdominal:      General: Bowel sounds are normal.      Palpations: Abdomen is soft. Comments: NON TENDER, NON DISTENTED  LIVER SPLEEN AND HERNIAS ARE NOT  PALPABLE  BOWEL SOUNDS ARE POSITIVE      Genitourinary:     Rectum: Normal.   Musculoskeletal: Normal range of motion. Skin:     General: Skin is warm.    Neurological:      Mental Status: He is alert and oriented to person, place, and time. Deep Tendon Reflexes: Reflexes are normal and symmetric. LABORATORY DATA: Reviewed  Lab Results   Component Value Date    WBC 3.8 05/04/2019    HGB 15.2 05/04/2019    HCT 44.7 05/04/2019    MCV 89.2 05/04/2019     05/04/2019     05/04/2019    K 4.8 05/04/2019     05/04/2019    CO2 27 05/04/2019    BUN 16 05/04/2019    CREATININE 0.91 05/04/2019    LABALBU 4.4 05/04/2019    BILITOT 0.87 05/04/2019    ALKPHOS 54 05/04/2019    AST 24 05/04/2019    ALT 24 05/04/2019         Lab Results   Component Value Date    RBC 5.01 05/04/2019    HGB 15.2 05/04/2019    MCV 89.2 05/04/2019    MCH 30.3 05/04/2019    MCHC 34.0 05/04/2019    RDW 12.6 05/04/2019    MPV 10.0 05/04/2019    BASOPCT 1 05/04/2019    LYMPHSABS 1.06 (L) 05/04/2019    MONOSABS 0.46 05/04/2019    NEUTROABS 2.05 05/04/2019    EOSABS 0.15 05/04/2019    BASOSABS 0.05 05/04/2019         DIAGNOSTIC TESTING:     No results found. Assessment  1. Change in bowel function    2. Screen for colon cancer    3. Diarrhea, unspecified type    4. Liquid stool        Plan    Plan Colonoscopy     The Endoscopic procedure was explained to the patient in detail  The prep and NPO were explained  All the Risks, Benefits, and Alternatives were explained  Risk of Bleeding, Perforation and Cardio Respiratory risks were explained  his questions were answered  The procedure has been scheduled with the  in the office  Patient was asked to give us a call for any questions  The patient has verbalized understanding and agreement to this plan. Pt was advised in detail about some life style and dietary modifications. He was advised about avoidance of caffeine, nicotine and chocolate. Pt was also told to stay away from any kind of fast foods, soda pops.  He was also advised to avoid lots of spices, grease and fried food etc.     Instructions were also given about trying to arrange the timing, quality and quantity of food. Instructions were given about using ample amount of fiber including dietary and supplemental fiber either metamucil, bennafiber or citrucell etc.  Pt was advised about drinking ample amount of water without any colors or chemicals. Stress was given about regular exercise. Pt has verbalized understanding and agreement to these modifications. The patient was instructed to start taking some OTC Probiotics products   These are available over the counter at the Pharmacy stores and Grocery stores  He was explained about the beneficial effects they have in the GI track  They will help to establish the good bacterial anamaria and will help with the digestion and bowel movements  The patient has verbalized understanding and agreement to this plan    More than half of patient's clinic visit time was spent in counseling about lifestyle and dietary modifications  Patient's  questions were answered in this regard as well  The patient has verbalized understanding and agreement       Thank you for allowing me to participate in the care of Mr. Harrington. For any further questions please do not hesitate to contact me. I have reviewed and agree with the ROS entered by the MA/Nurse.          Perry Tafoya MD, [facility]  Board Certified in Gastroenterology and [address] [place] [address] Gastroenterology  Office #: [phone]

## 2021-02-09 ENCOUNTER — TELEPHONE (OUTPATIENT)
Dept: GASTROENTEROLOGY | Age: 56
End: 2021-02-09

## 2021-02-09 NOTE — TELEPHONE ENCOUNTER
Any whittakerm asking about having Covid vaccine that will be prior to Covid testing. Writer returned call to inform Any An he can have both without interferrance.

## 2021-02-13 ENCOUNTER — HOSPITAL ENCOUNTER (OUTPATIENT)
Dept: LAB | Age: 56
Setting detail: SPECIMEN
Discharge: HOME | End: 2021-02-13
Payer: OTHER GOVERNMENT

## 2021-02-13 DIAGNOSIS — Z20.822 COVID-19 RULED OUT BY LABORATORY TESTING: Primary | ICD-10-CM

## 2021-02-13 PROCEDURE — U0003 INFECTIOUS AGENT DETECTION BY NUCLEIC ACID (DNA OR RNA); SEVERE ACUTE RESPIRATORY SYNDROME CORONAVIRUS 2 (SARS-COV-2) (CORONAVIRUS DISEASE [COVID-19]), AMPLIFIED PROBE TECHNIQUE, MAKING USE OF HIGH THROUGHPUT TECHNOLOGIES AS DESCRIBED BY CMS-2020-01-R: HCPCS

## 2021-02-13 PROCEDURE — U0005 INFEC AGEN DETEC AMPLI PROBE: HCPCS

## 2021-02-15 LAB
SARS-COV-2, RAPID: ABNORMAL
SARS-COV-2: ABNORMAL
SARS-COV-2: DETECTED
SOURCE: ABNORMAL

## 2021-02-16 ENCOUNTER — TELEPHONE (OUTPATIENT)
Dept: PEDIATRICS | Age: 56
End: 2021-02-16

## 2021-02-17 NOTE — TELEPHONE ENCOUNTER
Writer rec'd skype msg from [place] from [facility] scheduling stating pt has a positive covid test and proc has been cancelled.  Msg also states they notified pt of positive covid test.

## 2021-02-26 NOTE — TELEPHONE ENCOUNTER
Writer spoke to Al today to R/S procedure, new appt date Monday 3/29/21 @ 9:45 am at [facility]. Pt had Positive Covid Result and is being scheduled 4 wks after symptom resolution so no Covid re-test required. Pt still has Bowel Prep so new instructions will be prepared and sent to patient via [facility], E-mail address in chart [email or web address]  as well as through standard mail.

## 2021-04-07 ENCOUNTER — NURSE TRIAGE (OUTPATIENT)
Dept: OTHER | Facility: CLINIC | Age: 56
End: 2021-04-07

## 2021-04-07 NOTE — TELEPHONE ENCOUNTER
Reason for Disposition   Patient wants to be seen    Answer Assessment - Initial Assessment Questions  1. LOCATION and RADIATION: \"Where is the pain located? \"       Right knee    2. QUALITY: \"What does the pain feel like? \"  (e.g., sharp, dull, aching, burning)      Aching pain    3. SEVERITY: \"How bad is the pain? \" \"What does it keep you from doing? \"   (Scale 1-10; or mild, moderate, severe)    -  MILD (1-3): doesn't interfere with normal activities     -  MODERATE (4-7): interferes with normal activities (e.g., work or school) or awakens from sleep, limping     -  SEVERE (8-10): excruciating pain, unable to do any normal activities, unable to walk      2-3    4. ONSET: \"When did the pain start? \" \"Does it come and go, or is it there all the time? \"      Couple days ago    5. RECURRENT: \"Have you had this pain before? \" If so, ask: \"When, and what happened then? \"      Several yrs ago had knee pain when running, had physical therapy. Seemed to help at the time. 6. SETTING: \"Has there been any recent work, exercise or other activity that involved that part of the body? \"       Has been trying to get back in shape, anytime puts weight on it it hurts    7. AGGRAVATING FACTORS: \"What makes the knee pain worse? \" (e.g., walking, climbing stairs, running)      Running, climbing stairs. Wearing brace now and it helps    8. ASSOCIATED SYMPTOMS: \"Is there any swelling or redness of the knee? \"      Maybe a little swelling    9. OTHER SYMPTOMS: \"Do you have any other symptoms? \" (e.g., chest pain, difficulty breathing, fever, calf pain)      No     10. PREGNANCY: \"Is there any chance you are pregnant? \" \"When was your last menstrual period? \"        No    Protocols used: KNEE PAIN-ADULT-OH    Received call from [address] [place] [address] [facility] with [employer] Complaint. Brief description of triage: see above    Triage indicates for patient to see w/i 3 days.  Advised to go to [facility] or walk in clinic    Care advice provided, patient verbalizes understanding; denies any other questions or concerns; instructed to call back for any new or worsening symptoms. Writer provided warm transfer to Mary Ellen Davila at [facility] for appointment scheduling. Attention Provider: Thank you for allowing me to participate in the care of your patient. The patient was connected to triage in response to information provided to the ECC. Please do not respond through this encounter as the response is not directed to a shared pool.

## 2021-04-08 ENCOUNTER — HOSPITAL ENCOUNTER (OUTPATIENT)
Age: 56
Setting detail: SPECIMEN
Discharge: HOME | End: 2021-04-08
Payer: OTHER GOVERNMENT

## 2021-04-08 ENCOUNTER — OFFICE VISIT (OUTPATIENT)
Dept: FAMILY MEDICINE CLINIC | Age: 56
End: 2021-04-08
Payer: OTHER GOVERNMENT

## 2021-04-08 VITALS
WEIGHT: 166.6 LBS | SYSTOLIC BLOOD PRESSURE: 122 MMHG | HEART RATE: 55 BPM | OXYGEN SATURATION: 98 % | HEIGHT: 68 IN | BODY MASS INDEX: 25.25 KG/M2 | TEMPERATURE: 97.7 F | DIASTOLIC BLOOD PRESSURE: 78 MMHG | RESPIRATION RATE: 18 BRPM

## 2021-04-08 DIAGNOSIS — Z13.0 SCREENING FOR ENDOCRINE, METABOLIC AND IMMUNITY DISORDER: ICD-10-CM

## 2021-04-08 DIAGNOSIS — Z13.29 SCREENING FOR ENDOCRINE, METABOLIC AND IMMUNITY DISORDER: ICD-10-CM

## 2021-04-08 DIAGNOSIS — Z13.220 SCREENING CHOLESTEROL LEVEL: ICD-10-CM

## 2021-04-08 DIAGNOSIS — M25.561 ACUTE PAIN OF RIGHT KNEE: Primary | ICD-10-CM

## 2021-04-08 DIAGNOSIS — U07.1 COVID-19: ICD-10-CM

## 2021-04-08 DIAGNOSIS — Z13.228 SCREENING FOR ENDOCRINE, METABOLIC AND IMMUNITY DISORDER: ICD-10-CM

## 2021-04-08 DIAGNOSIS — E03.9 ACQUIRED HYPOTHYROIDISM: ICD-10-CM

## 2021-04-08 LAB
ABSOLUTE EOS #: 0.07 K/UL (ref 0–0.44)
ABSOLUTE IMMATURE GRANULOCYTE: <0.03 K/UL (ref 0–0.3)
ABSOLUTE LYMPH #: 1.11 K/UL (ref 1.1–3.7)
ABSOLUTE MONO #: 0.49 K/UL (ref 0.1–1.2)
ALBUMIN SERPL-MCNC: 4.4 G/DL (ref 3.5–5.2)
ALBUMIN/GLOBULIN RATIO: 1.8 (ref 1–2.5)
ALP BLD-CCNC: 60 U/L (ref 40–129)
ALT SERPL-CCNC: 24 U/L (ref 5–41)
ANION GAP SERPL CALCULATED.3IONS-SCNC: 9 MMOL/L (ref 9–17)
AST SERPL-CCNC: 27 U/L
BASOPHILS # BLD: 1 % (ref 0–2)
BASOPHILS ABSOLUTE: 0.05 K/UL (ref 0–0.2)
BILIRUB SERPL-MCNC: 0.67 MG/DL (ref 0.3–1.2)
BUN BLDV-MCNC: 12 MG/DL (ref 6–20)
BUN/CREAT BLD: NORMAL (ref 9–20)
CALCIUM SERPL-MCNC: 8.8 MG/DL (ref 8.6–10.4)
CHLORIDE BLD-SCNC: 103 MMOL/L (ref 98–107)
CHOLESTEROL/HDL RATIO: 4.2
CHOLESTEROL: 225 MG/DL
CO2: 27 MMOL/L (ref 20–31)
CREAT SERPL-MCNC: 0.93 MG/DL (ref 0.7–1.2)
DIFFERENTIAL TYPE: ABNORMAL
EOSINOPHILS RELATIVE PERCENT: 2 % (ref 1–4)
GFR AFRICAN AMERICAN: >60 ML/MIN
GFR NON-AFRICAN AMERICAN: >60 ML/MIN
GFR SERPL CREATININE-BSD FRML MDRD: NORMAL ML/MIN/{1.73_M2}
GFR SERPL CREATININE-BSD FRML MDRD: NORMAL ML/MIN/{1.73_M2}
GLUCOSE BLD-MCNC: 92 MG/DL (ref 70–99)
HCT VFR BLD CALC: 44 % (ref 40.7–50.3)
HDLC SERPL-MCNC: 54 MG/DL
HEMOGLOBIN: 14.9 G/DL (ref 13–17)
IMMATURE GRANULOCYTES: 0 %
LDL CHOLESTEROL: 140 MG/DL (ref 0–130)
LYMPHOCYTES # BLD: 31 % (ref 24–43)
MCH RBC QN AUTO: 30.2 PG (ref 25.2–33.5)
MCHC RBC AUTO-ENTMCNC: 33.9 G/DL (ref 28.4–34.8)
MCV RBC AUTO: 89.2 FL (ref 82.6–102.9)
MONOCYTES # BLD: 14 % (ref 3–12)
NRBC AUTOMATED: 0 PER 100 WBC
PDW BLD-RTO: 12.9 % (ref 11.8–14.4)
PLATELET # BLD: 205 K/UL (ref 138–453)
PLATELET ESTIMATE: ABNORMAL
PMV BLD AUTO: 10.5 FL (ref 8.1–13.5)
POTASSIUM SERPL-SCNC: 4.7 MMOL/L (ref 3.7–5.3)
RBC # BLD: 4.93 M/UL (ref 4.21–5.77)
RBC # BLD: ABNORMAL 10*6/UL
SEG NEUTROPHILS: 52 % (ref 36–65)
SEGMENTED NEUTROPHILS ABSOLUTE COUNT: 1.87 K/UL (ref 1.5–8.1)
SODIUM BLD-SCNC: 139 MMOL/L (ref 135–144)
TOTAL PROTEIN: 6.8 G/DL (ref 6.4–8.3)
TRIGL SERPL-MCNC: 154 MG/DL
VLDLC SERPL CALC-MCNC: ABNORMAL MG/DL (ref 1–30)
WBC # BLD: 3.6 K/UL (ref 3.5–11.3)
WBC # BLD: ABNORMAL 10*3/UL

## 2021-04-08 PROCEDURE — 99213 OFFICE O/P EST LOW 20 MIN: CPT | Performed by: FAMILY MEDICINE

## 2021-04-08 PROCEDURE — 36415 COLL VENOUS BLD VENIPUNCTURE: CPT | Performed by: FAMILY MEDICINE

## 2021-04-08 SDOH — ECONOMIC STABILITY: INCOME INSECURITY: HOW HARD IS IT FOR YOU TO PAY FOR THE VERY BASICS LIKE FOOD, HOUSING, MEDICAL CARE, AND HEATING?: NOT HARD AT ALL

## 2021-04-08 SDOH — ECONOMIC STABILITY: TRANSPORTATION INSECURITY
IN THE PAST 12 MONTHS, HAS THE LACK OF TRANSPORTATION KEPT YOU FROM MEDICAL APPOINTMENTS OR FROM GETTING MEDICATIONS?: NO

## 2021-04-08 ASSESSMENT — PATIENT HEALTH QUESTIONNAIRE - PHQ9
2. FEELING DOWN, DEPRESSED OR HOPELESS: 0
SUM OF ALL RESPONSES TO PHQ QUESTIONS 1-9: 0
SUM OF ALL RESPONSES TO PHQ QUESTIONS 1-9: 0

## 2021-04-08 ASSESSMENT — ENCOUNTER SYMPTOMS
SHORTNESS OF BREATH: 0
ALLERGIC/IMMUNOLOGIC NEGATIVE: 1
ABDOMINAL PAIN: 0
DIARRHEA: 0
CONSTIPATION: 0
COUGH: 0
EYES NEGATIVE: 1

## 2021-04-08 NOTE — PROGRESS NOTES
Depression screening done  Chief Complaint   Patient presents with    Knee Pain     right knee pain for two weeks.  He has started running again and noticed the pain more

## 2021-04-08 NOTE — PROGRESS NOTES
[facility]  [facility]  [address]  [facility] [address]  Dept: [phone]    4/8/2021    CHIEF COMPLAINT    Chief Complaint   Patient presents with    Knee Pain     right knee pain for two weeks. He has started running again and noticed the pain more       HPI    Annmarie Boyce is a 48 y.o. male who presents   Chief Complaint   Patient presents with    Knee Pain     right knee pain for two weeks. He has started running again and noticed the pain more   . Pain in right knee occurring over the past few weeks. He is in the [address] [place] [address] and was starting to try and run. He had only been walking for the past year. Pain started when he increased his activity level. He has had pain in the knee previously and it was improved with physical therapy. He has a colonoscopy scheduled for later this month. History of testing positive for Covid in February with some mild symptoms that have completely resolved. History of hypothyroidism. Had been seeing an endocrinologist but does not recall when he was seen last.  He believes his levels have been stable. Knee Pain   The incident occurred more than 1 week ago. There was no injury mechanism. The pain is present in the right knee. The pain is moderate. The pain has been fluctuating since onset. Vitals:    04/08/21 0954   BP: 122/78   Site: Left Upper Arm   Position: Sitting   Cuff Size: Large Adult   Pulse: 55   Resp: 18   Temp: 97.7 °F (36.5 °C)   TempSrc: Temporal   SpO2: 98%   Weight: 166 lb 9.6 oz (75.6 kg)   Height: 5' 8\" (1.727 m)       REVIEW OF SYSTEMS    Review of Systems   Constitutional: Negative for fatigue, fever and unexpected weight change. HENT: Negative. Eyes: Negative. Respiratory: Negative for cough and shortness of breath. Cardiovascular: Negative for chest pain and leg swelling. Gastrointestinal: Negative for abdominal pain, constipation and diarrhea.    Endocrine: Negative. Genitourinary: Negative for frequency and urgency. Musculoskeletal: Positive for arthralgias (rt knee pain). Skin: Negative. Allergic/Immunologic: Negative. Neurological: Negative for dizziness and headaches. Hematological: Negative. Psychiatric/Behavioral: Negative for sleep disturbance. The patient is not nervous/anxious.         PAST MEDICAL HISTORY    Past Medical History:   Diagnosis Date    Allergic rhinitis     Change in bowel function     Chronic fatigue     Combined arterial insufficiency and corporo-venous occlusive erectile dysfunction     COVID-19 02/2021    Excessive daytime sleepiness     Hypothyroidism     Dr. Zavala Do hyperlipidemia     Screen for colon cancer     Snoring        FAMILY HISTORY    Family History   Problem Relation Age of Onset    Heart Disease Mother     Pacemaker Mother     Thyroid Disease Mother     Cancer Father     Stroke Father     Lung Cancer Father     Brain Cancer Father     High Blood Pressure Sister     Thyroid Disease Brother     Thyroid Disease Brother        SOCIAL HISTORY    Social History     Socioeconomic History    Marital status:      Spouse name: None    Number of children: None    Years of education: None    Highest education level: None   Occupational History    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[employer], 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[place],  [address] Needs    Financial resource strain: Not hard at all   [email or web address] insecurity     Worry: Never true     Inability: Never true   [place] needs     Medical: No     Non-medical: No   Tobacco Use    Smoking status: Never Smoker    Smokeless tobacco: Never Used   Substance and Sexual Activity    Alcohol use: Yes     Comment: 1 or 2 beers a week    Drug use: No    Sexual activity: Yes     Partners: Female   Lifestyle    Physical activity     Days per week: None     Minutes per session: None    Stress: None   Relationships    Social connections     Talks on phone: None     Gets together: None     Attends Advent service: None     Active member of club or organization: None     Attends meetings of clubs or organizations: None     Relationship status: None    Intimate partner violence     Fear of current or ex partner: None     Emotionally abused: None     Physically abused: None     Forced sexual activity: None   Other Topics Concern    None   Social History Narrative    None       SURGICAL HISTORY    History reviewed. No pertinent surgical history. CURRENT MEDICATIONS    Current Outpatient Medications   Medication Sig Dispense Refill    Na Sulfate-K Sulfate-Mg Sulf (SUPREP BOWEL PREP KIT) 17.5-3.13-1.6 GM/177ML SOLN Use as directed per instructions provided by physician office 1 kit 0    montelukast (SINGULAIR) 10 MG tablet TAKE ONE TABLET BY MOUTH ONCE NIGHTLY 30 tablet 4    azelastine (ASTELIN) 0.1 % nasal spray 2 sprays by Nasal route 2 times daily 2 Spray in each nostril 4 Bottle 1    levothyroxine (SYNTHROID) 125 MCG tablet TAKE ONE TABLET BY MOUTH DAILY 30 tablet 5    Cetirizine HCl (ZYRTEC ALLERGY) 10 MG CAPS 1 tablet       No current facility-administered medications for this visit. ALLERGIES    Allergies   Allergen Reactions    Codeine     Requip [Ropinirole Hcl]        PHYSICAL EXAM   Physical Exam  Vitals signs reviewed. Constitutional:       Appearance: He is well-developed and normal weight. HENT:      Head: Normocephalic. Eyes:      Conjunctiva/sclera: Conjunctivae normal.      Pupils: Pupils are equal, round, and reactive to light. Neck:      Musculoskeletal: Normal range of motion and neck supple. Thyroid: No thyromegaly. Cardiovascular:      Rate and Rhythm: Normal rate and regular rhythm. Heart sounds: Normal heart sounds. No murmur. Pulmonary:      Effort: Pulmonary effort is normal.      Breath sounds: Normal breath sounds. No wheezing or rales.    Abdominal:      Palpations: Abdomen is soft. Musculoskeletal: Normal range of motion. General: Tenderness (rt medial knee with mild effusion) present. No deformity. Lymphadenopathy:      Cervical: No cervical adenopathy. Skin:     General: Skin is warm and dry. Neurological:      Mental Status: He is alert and oriented to person, place, and time. Psychiatric:         Mood and Affect: Mood normal.         Behavior: Behavior normal.         Thought Content: Thought content normal.         Judgment: Judgment normal.         ASSESSMENT/PLAN  1. Acute pain of right knee  Refer to Ortho. Continue walking but avoid the vigorous running at this time due to the pain. - [address] [place] [address] and Sports Medicine    2. Acquired hypothyroidism  We will try to find prior endocrine notes and labs. 3. COVID-19  No residual symptoms. Has been vaccinated. Labs drawn that were ordered at prior appointment and not obtained. Levy Fowler received counseling on the following healthy behaviors: nutrition, exercise and medication adherence  Reviewed prior labs and health maintenance. Continue current medications, diet and exercise. Discussed use, benefit, and side effects of prescribed medications. Barriers to medication compliance addressed. Patient given educational materials - see patient instructions. All patient questions answered. Patient voiced understanding. Return if symptoms worsen or fail to improve.         Electronically signed by Jessica Blackburn MD on 4/8/21 at 10:00 AM EDT

## 2021-04-19 ENCOUNTER — HOSPITAL ENCOUNTER (OUTPATIENT)
Age: 56
Setting detail: OUTPATIENT SURGERY
Discharge: HOME | End: 2021-04-19
Attending: INTERNAL MEDICINE | Admitting: INTERNAL MEDICINE
Payer: OTHER GOVERNMENT

## 2021-04-19 ENCOUNTER — ANESTHESIA EVENT (OUTPATIENT)
Dept: OPERATING ROOM | Age: 56
End: 2021-04-19
Payer: OTHER GOVERNMENT

## 2021-04-19 ENCOUNTER — ANESTHESIA (OUTPATIENT)
Dept: OPERATING ROOM | Age: 56
End: 2021-04-19
Payer: OTHER GOVERNMENT

## 2021-04-19 VITALS
TEMPERATURE: 97.8 F | SYSTOLIC BLOOD PRESSURE: 137 MMHG | OXYGEN SATURATION: 99 % | WEIGHT: 165 LBS | DIASTOLIC BLOOD PRESSURE: 83 MMHG | BODY MASS INDEX: 25.01 KG/M2 | HEART RATE: 46 BPM | HEIGHT: 68 IN | RESPIRATION RATE: 12 BRPM

## 2021-04-19 VITALS
OXYGEN SATURATION: 99 % | RESPIRATION RATE: 16 BRPM | DIASTOLIC BLOOD PRESSURE: 59 MMHG | SYSTOLIC BLOOD PRESSURE: 111 MMHG

## 2021-04-19 PROCEDURE — 3700000000 HC ANESTHESIA ATTENDED CARE: Performed by: INTERNAL MEDICINE

## 2021-04-19 PROCEDURE — 7100000010 HC PHASE II RECOVERY - FIRST 15 MIN: Performed by: INTERNAL MEDICINE

## 2021-04-19 PROCEDURE — 3609010700 HC COLONOSCOPY POLYPECTOMY REMOVAL SNARE/STOMA: Performed by: INTERNAL MEDICINE

## 2021-04-19 PROCEDURE — 45380 COLONOSCOPY AND BIOPSY: CPT | Performed by: INTERNAL MEDICINE

## 2021-04-19 PROCEDURE — 7100000011 HC PHASE II RECOVERY - ADDTL 15 MIN: Performed by: INTERNAL MEDICINE

## 2021-04-19 PROCEDURE — 2709999900 HC NON-CHARGEABLE SUPPLY: Performed by: INTERNAL MEDICINE

## 2021-04-19 PROCEDURE — 6360000002 HC RX W HCPCS: Performed by: NURSE ANESTHETIST, CERTIFIED REGISTERED

## 2021-04-19 PROCEDURE — 88305 TISSUE EXAM BY PATHOLOGIST: CPT

## 2021-04-19 PROCEDURE — 3700000001 HC ADD 15 MINUTES (ANESTHESIA): Performed by: INTERNAL MEDICINE

## 2021-04-19 PROCEDURE — 2580000003 HC RX 258: Performed by: ANESTHESIOLOGY

## 2021-04-19 PROCEDURE — 2500000003 HC RX 250 WO HCPCS: Performed by: NURSE ANESTHETIST, CERTIFIED REGISTERED

## 2021-04-19 RX ORDER — LIDOCAINE HYDROCHLORIDE 20 MG/ML
INJECTION, SOLUTION EPIDURAL; INFILTRATION; INTRACAUDAL; PERINEURAL PRN
Status: DISCONTINUED | OUTPATIENT
Start: 2021-04-19 | End: 2021-04-19 | Stop reason: SDUPTHER

## 2021-04-19 RX ORDER — SODIUM CHLORIDE, SODIUM LACTATE, POTASSIUM CHLORIDE, CALCIUM CHLORIDE 600; 310; 30; 20 MG/100ML; MG/100ML; MG/100ML; MG/100ML
INJECTION, SOLUTION INTRAVENOUS CONTINUOUS
Status: DISCONTINUED | OUTPATIENT
Start: 2021-04-20 | End: 2021-04-19 | Stop reason: HOSPADM

## 2021-04-19 RX ORDER — PROPOFOL 10 MG/ML
INJECTION, EMULSION INTRAVENOUS PRN
Status: DISCONTINUED | OUTPATIENT
Start: 2021-04-19 | End: 2021-04-19 | Stop reason: SDUPTHER

## 2021-04-19 RX ORDER — ONDANSETRON 2 MG/ML
4 INJECTION INTRAMUSCULAR; INTRAVENOUS
Status: DISCONTINUED | OUTPATIENT
Start: 2021-04-19 | End: 2021-04-19 | Stop reason: HOSPADM

## 2021-04-19 RX ORDER — LIDOCAINE HYDROCHLORIDE 10 MG/ML
1 INJECTION, SOLUTION EPIDURAL; INFILTRATION; INTRACAUDAL; PERINEURAL
Status: DISCONTINUED | OUTPATIENT
Start: 2021-04-20 | End: 2021-04-19 | Stop reason: HOSPADM

## 2021-04-19 RX ADMIN — PROPOFOL 50 MG: 10 INJECTION, EMULSION INTRAVENOUS at 10:24

## 2021-04-19 RX ADMIN — LIDOCAINE HYDROCHLORIDE 100 MG: 20 INJECTION, SOLUTION EPIDURAL; INFILTRATION; INTRACAUDAL; PERINEURAL at 10:20

## 2021-04-19 RX ADMIN — PROPOFOL 30 MG: 10 INJECTION, EMULSION INTRAVENOUS at 10:34

## 2021-04-19 RX ADMIN — PROPOFOL 30 MG: 10 INJECTION, EMULSION INTRAVENOUS at 10:28

## 2021-04-19 RX ADMIN — SODIUM CHLORIDE, POTASSIUM CHLORIDE, SODIUM LACTATE AND CALCIUM CHLORIDE: 600; 310; 30; 20 INJECTION, SOLUTION INTRAVENOUS at 08:52

## 2021-04-19 RX ADMIN — PROPOFOL 50 MG: 10 INJECTION, EMULSION INTRAVENOUS at 10:22

## 2021-04-19 RX ADMIN — PROPOFOL 30 MG: 10 INJECTION, EMULSION INTRAVENOUS at 10:32

## 2021-04-19 RX ADMIN — PROPOFOL 50 MG: 10 INJECTION, EMULSION INTRAVENOUS at 10:20

## 2021-04-19 RX ADMIN — PROPOFOL 30 MG: 10 INJECTION, EMULSION INTRAVENOUS at 10:29

## 2021-04-19 ASSESSMENT — PAIN SCALES - GENERAL: PAINLEVEL_OUTOF10: 0

## 2021-04-19 ASSESSMENT — PULMONARY FUNCTION TESTS
PIF_VALUE: 1

## 2021-04-19 ASSESSMENT — PAIN - FUNCTIONAL ASSESSMENT: PAIN_FUNCTIONAL_ASSESSMENT: 0-10

## 2021-04-19 ASSESSMENT — ENCOUNTER SYMPTOMS: SHORTNESS OF BREATH: 0

## 2021-04-19 NOTE — ANESTHESIA POSTPROCEDURE EVALUATION
Department of Anesthesiology  Postprocedure Note    Patient: Gemma De La Rosa  MRN: [id number]  YOB: 1965  Date of evaluation: 4/19/2021  Time:  12:16 PM     Procedure Summary     Date: 04/19/21 Room / Location: [facility] Watson  / [facility] - INPATIENT    Anesthesia Start: 5467 Anesthesia Stop: 4775    Procedure: COLONOSCOPY POLYPECTOMY (N/A ) Diagnosis: (DX LIQUID STOOL,  CHANGE IN BOWEL FUNCTION,  DIARRHEA)    Surgeons: Kathy Marti MD Responsible Provider: Diego Palmer MD    Anesthesia Type: MAC ASA Status: 3          Anesthesia Type: MAC    Margarita Phase I: Margarita Score: 10    Margarita Phase II: Margarita Score: 8    Last vitals: Reviewed and per EMR flowsheets.        Anesthesia Post Evaluation    Complications: no

## 2021-04-19 NOTE — OP NOTE
PROCEDURE NOTE    DATE OF PROCEDURE: 4/19/2021    SURGEON: Kathy Marti MD    ASSISTANT: None    PREOPERATIVE DIAGNOSIS: SCREENING  DIARRHEA    POSTOPERATIVE DIAGNOSIS: as described below    OPERATION: Total colonoscopy     ANESTHESIA: MAC PER ANESTHESIA     ESTIMATED BLOOD LOSS: less than 50     COMPLICATIONS: None. SPECIMENS:  Was Obtained:     HISTORY: The patient is a 48y.o. year old male with history of above preop diagnosis. I recommended colonoscopy with possible biopsy or polypectomy and I explained the risk, benefits, expected outcome, and alternatives to the procedure. Risks included but are not limited to bleeding, infection, respiratory distress, hypotension, and perforation of the colon and possibility of missing a lesion. The patient understands and is in agreement. PROCEDURE: The patient was given IV conscious sedation. The patient's SPO2 remained above 90% throughout the procedure. The colonoscope was inserted per rectum and advanced under direct vision to the cecum without difficulty. The prep was good. Findings:  Terminal ileum: normal    Cecum/Ascending colon: abnormal: A 4 MM POLYP WAS REMOVED WITH JUMBO BIOPSY FORCES AT THE HEPATIC FLEXURES AREA     Transverse colon: normal    Descending/Sigmoid colon: abnormal: DIVERTICULOSIS    Rectum/Anus: examined in normal and retroflexed positions and was abnormal: INT HEMORRHOIDS    Withdrawal Time was (minutes): 10    The colon was decompressed and the scope was removed. The patient tolerated the procedure well. Recommendations/Plan:   1. Lifestyle and dietary modifications as discussed  2. F/U Biopsies  3. F/U In Office in 3-4 weeks  4. Discussed with the family  5. Colonoscopy Recall :3 year  6. POST SEDATION PATIENT WAS STABLE WITH STABLE VITAL SIGNS AND OXYGEN SATURATIONS AND WAS DISCHARGED HOME WITH RIDE IN A STABLE CONDITION.     Electronically signed by Kathy Marti MD  on 4/19/2021 at 10:39 AM

## 2021-04-19 NOTE — ANESTHESIA PRE PROCEDURE
Department of Anesthesiology  Preprocedure Note       Name:  Glynn Gunderson   Age:  48 y.o.  :  1965                                          MRN:  [id number]         Date:  2021      Surgeon: Stan Anna):  Erna Stubbs MD    Procedure: Procedure(s):  COLONOSCOPY DIAGNOSTIC    Medications prior to admission:   Prior to Admission medications    Medication Sig Start Date End Date Taking? Authorizing Provider   Na Sulfate-K Sulfate-Mg Sulf (SUPREP BOWEL PREP KIT) 17.5-3.13-1.6 GM/177ML SOLN Use as directed per instructions provided by physician office 21  Yes Erna Stubbs MD   montelukast (SINGULAIR) 10 MG tablet TAKE ONE TABLET BY MOUTH ONCE NIGHTLY 20  Yes Moises De La Fuente MD   levothyroxine (SYNTHROID) 125 MCG tablet TAKE ONE TABLET BY MOUTH DAILY 20  Yes Moises De La Fuente MD   Cetirizine HCl (ZYRTEC ALLERGY) 10 MG CAPS    Yes Historical Provider, MD   azelastine (ASTELIN) 0.1 % nasal spray 2 sprays by Nasal route 2 times daily 2 Spray in each nostril 20   Moises De La Fuente MD       Current medications:    Current Facility-Administered Medications   Medication Dose Route Frequency Provider Last Rate Last Admin    [START ON 2021] lactated ringers infusion   Intravenous Continuous Panchito Larkin MD 50 mL/hr at 21 0852 New Bag at 21 0852    [START ON 2021] lidocaine PF 1 % injection 1 mL  1 mL Intradermal Once PRN Panchito Larkin MD           Allergies:     Allergies   Allergen Reactions    Codeine     Requip [Ropinirole Hcl]        Problem List:    Patient Active Problem List   Diagnosis Code    Hypothyroidism E03.9    Regular astigmatism H52.229    Allergic rhinitis J30.9    Change in bowel function R19.8    Diarrhea R19.7    Liquid stool R19.7    COVID-19 U07.1       Past Medical History:        Diagnosis Date    Allergic rhinitis     Change in bowel function     Chronic fatigue     Combined arterial insufficiency and corporo-venous occlusive erectile dysfunction     COVID-19 02/2021    Excessive daytime sleepiness     Hypothyroidism     Dr. Keyanna Chambers Mixed hyperlipidemia     Screen for colon cancer     Sleep apnea with use of continuous positive airway pressure (CPAP)     Snoring        Past Surgical History:        Procedure Laterality Date    DENTAL SURGERY      inplant        Social History:    Social History     Tobacco Use    Smoking status: Never Smoker    Smokeless tobacco: Never Used   Substance Use Topics    Alcohol use: Yes     Comment: 1 or 2 beers a week                                Counseling given: Not Answered      Vital Signs (Current):   Vitals:    04/19/21 0826 04/19/21 0827   BP:  135/81   Pulse:  56   Resp:  16   Temp:  97.3 °F (36.3 °C)   TempSrc:  Temporal   SpO2:  99%   Weight: 165 lb (74.8 kg)    Height: 5' 8\" (1.727 m)                                               BP Readings from Last 3 Encounters:   04/19/21 135/81   04/08/21 122/78   01/26/21 137/82       NPO Status: Time of last liquid consumption: 1900                        Time of last solid consumption: 1800                        Date of last liquid consumption: 04/18/21                        Date of last solid food consumption: 04/17/21    BMI:   Wt Readings from Last 3 Encounters:   04/19/21 165 lb (74.8 kg)   04/08/21 166 lb 9.6 oz (75.6 kg)   01/26/21 168 lb 11.2 oz (76.5 kg)     Body mass index is 25.09 kg/m².     CBC:   Lab Results   Component Value Date    WBC 3.6 04/08/2021    RBC 4.93 04/08/2021    HGB 14.9 04/08/2021    HCT 44.0 04/08/2021    MCV 89.2 04/08/2021    RDW 12.9 04/08/2021     04/08/2021       CMP:   Lab Results   Component Value Date     04/08/2021    K 4.7 04/08/2021     04/08/2021    CO2 27 04/08/2021    BUN 12 04/08/2021    CREATININE 0.93 04/08/2021    GFRAA >60 04/08/2021    LABGLOM >60 04/08/2021    GLUCOSE 92 04/08/2021    PROT 6.8 04/08/2021    CALCIUM 8.8 04/08/2021    BILITOT 0.67 04/08/2021    ALKPHOS 60 04/08/2021 AST 27 04/08/2021    ALT 24 04/08/2021       POC Tests: No results for input(s): POCGLU, POCNA, POCK, POCCL, POCBUN, POCHEMO, POCHCT in the last 72 hours. Coags: No results found for: PROTIME, INR, APTT    HCG (If Applicable): No results found for: PREGTESTUR, PREGSERUM, HCG, HCGQUANT     ABGs: No results found for: PHART, PO2ART, CNX9UTU, YBB0NCJ, BEART, E0KLKGSD     Type & Screen (If Applicable):  No results found for: LABABO, LABRH    Drug/Infectious Status (If Applicable):  No results found for: HIV, HEPCAB    COVID-19 Screening (If Applicable):   Lab Results   Component Value Date    COVID19 DETECTED 02/13/2021           Anesthesia Evaluation    Airway: Mallampati: I  TM distance: >3 FB   Neck ROM: full  Mouth opening: > = 3 FB Dental:          Pulmonary:   (+) sleep apnea:      (-) shortness of breath                           Cardiovascular:        (-) past MI and  angina                Neuro/Psych:               GI/Hepatic/Renal:             Endo/Other:                     Abdominal:           Vascular:                                        Anesthesia Plan      general     ASA 3             Anesthetic plan and risks discussed with patient.                     Toby Manuel MD   4/19/2021

## 2021-04-19 NOTE — H&P
History and Physical GI Update    Pt Name: Sam Bermudez  MRN: [id number]  YOB: 1965  Date of evaluation: 4/19/2021      [x] I have reviewed the office note found in [facility] dated 4/8/21 by Dr. Denilson Blanc which meets the criteria for an Interval History and Physical note and is attached below. [x] I have examined  Sam Bermudez and there are no changes to the patient or plans for a Colonoscopy by Dr Denilson Blanc for Screening. Bowel Prep completed: Yes with yellow watery results. Last colonoscopy none. Denies family history of colon cancer or colon polyps. Denies history of ulcers, hiatal hernia, acid reflux/GERD, or IBS. Previous EGD  Patient today denies fever, chills, night sweats, pain or unexplained weight loss. Complains of loose stool often for last year. Denies the use of oral or injectable blood thinners. Vital signs: /81   Pulse 56   Temp 97.3 °F (36.3 °C) (Temporal)   Resp 16   Ht 5' 8\" (1.727 m)   Wt 165 lb (74.8 kg)   SpO2 99%   BMI 25.09 kg/m²     Allergies:  Codeine and Requip [ropinirole hcl]    Medications:   No current facility-administered medications on file prior to encounter. Current Outpatient Medications on File Prior to Encounter   Medication Sig Dispense Refill    Na Sulfate-K Sulfate-Mg Sulf (SUPREP BOWEL PREP KIT) 17.5-3.13-1.6 GM/177ML SOLN Use as directed per instructions provided by physician office 1 kit 0    montelukast (SINGULAIR) 10 MG tablet TAKE ONE TABLET BY MOUTH ONCE NIGHTLY 30 tablet 4    levothyroxine (SYNTHROID) 125 MCG tablet TAKE ONE TABLET BY MOUTH DAILY 30 tablet 5    Cetirizine HCl (ZYRTEC ALLERGY) 10 MG CAPS       azelastine (ASTELIN) 0.1 % nasal spray 2 sprays by Nasal route 2 times daily 2 Spray in each nostril 4 Bottle 1            Prior to Admission medications    Medication Sig Start Date End Date Taking?  Authorizing Provider   Na Sulfate-K Sulfate-Mg Sulf (SUPREP BOWEL PREP KIT) 17.5-3.13-1.6 GM/177ML SOLN Use as directed per instructions provided by physician office 1/26/21  Yes Mark Rowan MD   montelukast (SINGULAIR) 10 MG tablet TAKE ONE TABLET BY MOUTH ONCE NIGHTLY 12/20/20  Yes Ruthy Sharp MD   levothyroxine (SYNTHROID) 125 MCG tablet TAKE ONE TABLET BY MOUTH DAILY 5/26/20  Yes Ruthy Sharp MD   Cetirizine HCl (ZYRTEC ALLERGY) 10 MG CAPS    Yes Historical Provider, MD   azelastine (ASTELIN) 0.1 % nasal spray 2 sprays by Nasal route 2 times daily 2 Spray in each nostril 7/29/20   Ruthy Sharp MD       This is a 48 y.o. male who is  pleasant, cooperative, alert and oriented x3, in no acute distress. Heart: Heart regular rate and rhythm   Lungs:clear to auscultation without wheezes or rales    Abdomen: soft, nontender, nondistended, no masses or organomegaly  soft, nontender, nondistended, no masses or organomegaly. active bowel sounds present . Labs:  Recent Labs     04/08/21 2018   HGB 14.9   HCT 44.0   WBC 3.6   MCV 89.2      K 4.7      CO2 27   BUN 12   CREATININE 0.93   GLUCOSE 92   AST 27   ALT 24   LABALBU 4.4       Sharren Adjutant  DIANE CONTRERAS,  Electronically signed 4/19/2021 at 9:01 AM    Ruthy Sharp MD   Physician   Specialty:  Family Medicine   Progress Notes   Signed   Encounter Date:  4/8/2021         Related encounter: Office Visit from 4/8/2021 in [address] [place] [address] All     Show:Clear all  [x]Manual[x]Template[]Copied    Added by:  Lea Singh MD    []Chau for details             [address]  Dept: [phone]     4/8/2021     CHIEF COMPLAINT         Chief Complaint   Patient presents with    Knee Pain       right knee pain for two weeks.  He has started running again and noticed the pain more         HPI    Shagufta Power is a 48 y.o. male who presents   Chief Complaint   Patient presents with    Knee Pain right knee pain for two weeks. He has started running again and noticed the pain more   . Pain in right knee occurring over the past few weeks. He is in the [address] [place] [address] and was starting to try and run. He had only been walking for the past year. Pain started when he increased his activity level. He has had pain in the knee previously and it was improved with physical therapy. He has a colonoscopy scheduled for later this month. History of testing positive for Covid in February with some mild symptoms that have completely resolved. History of hypothyroidism. Had been seeing an endocrinologist but does not recall when he was seen last.  He believes his levels have been stable. Knee Pain   The incident occurred more than 1 week ago. There was no injury mechanism. The pain is present in the right knee. The pain is moderate. The pain has been fluctuating since onset. Vitals       Vitals:     04/08/21 0954   BP: 122/78   Site: Left Upper Arm   Position: Sitting   Cuff Size: Large Adult   Pulse: 55   Resp: 18   Temp: 97.7 °F (36.5 °C)   TempSrc: Temporal   SpO2: 98%   Weight: 166 lb 9.6 oz (75.6 kg)   Height: 5' 8\" (1.727 m)            REVIEW OF SYSTEMS    Review of Systems   Constitutional: Negative for fatigue, fever and unexpected weight change. HENT: Negative. Eyes: Negative. Respiratory: Negative for cough and shortness of breath. Cardiovascular: Negative for chest pain and leg swelling. Gastrointestinal: Negative for abdominal pain, constipation and diarrhea. Endocrine: Negative. Genitourinary: Negative for frequency and urgency. Musculoskeletal: Positive for arthralgias (rt knee pain). Skin: Negative. Allergic/Immunologic: Negative. Neurological: Negative for dizziness and headaches. Hematological: Negative. Psychiatric/Behavioral: Negative for sleep disturbance. The patient is not nervous/anxious.           PAST MEDICAL HISTORY    Past Medical History current or ex partner: None       Emotionally abused: None       Physically abused: None       Forced sexual activity: None   Other Topics Concern    None   Social History Narrative    None            SURGICAL HISTORY    Past Surgical History   History reviewed. No pertinent surgical history. CURRENT MEDICATIONS    Current Facility-Administered Medications          Current Outpatient Medications   Medication Sig Dispense Refill    Na Sulfate-K Sulfate-Mg Sulf (SUPREP BOWEL PREP KIT) 17.5-3.13-1.6 GM/177ML SOLN Use as directed per instructions provided by physician office 1 kit 0    montelukast (SINGULAIR) 10 MG tablet TAKE ONE TABLET BY MOUTH ONCE NIGHTLY 30 tablet 4    azelastine (ASTELIN) 0.1 % nasal spray 2 sprays by Nasal route 2 times daily 2 Spray in each nostril 4 Bottle 1    levothyroxine (SYNTHROID) 125 MCG tablet TAKE ONE TABLET BY MOUTH DAILY 30 tablet 5    Cetirizine HCl (ZYRTEC ALLERGY) 10 MG CAPS 1 tablet          No current facility-administered medications for this visit. ALLERGIES         Allergies   Allergen Reactions    Codeine      Requip [Ropinirole Hcl]           PHYSICAL EXAM   Physical Exam  Vitals signs reviewed. Constitutional:       Appearance: He is well-developed and normal weight. HENT:      Head: Normocephalic. Eyes:      Conjunctiva/sclera: Conjunctivae normal.      Pupils: Pupils are equal, round, and reactive to light. Neck:      Musculoskeletal: Normal range of motion and neck supple. Thyroid: No thyromegaly. Cardiovascular:      Rate and Rhythm: Normal rate and regular rhythm. Heart sounds: Normal heart sounds. No murmur. Pulmonary:      Effort: Pulmonary effort is normal.      Breath sounds: Normal breath sounds. No wheezing or rales. Abdominal:      Palpations: Abdomen is soft. Musculoskeletal: Normal range of motion. General: Tenderness (rt medial knee with mild effusion) present. No deformity.    Lymphadenopathy: Cervical: No cervical adenopathy. Skin:     General: Skin is warm and dry. Neurological:      Mental Status: He is alert and oriented to person, place, and time. Psychiatric:         Mood and Affect: Mood normal.         Behavior: Behavior normal.         Thought Content: Thought content normal.         Judgment: Judgment normal.            ASSESSMENT/PLAN  1. Acute pain of right knee  Refer to Ortho. Continue walking but avoid the vigorous running at this time due to the pain. - [facility] [address] [place] [address] and [facility]     2. Acquired hypothyroidism  We will try to find prior endocrine notes and labs. 3. COVID-19  No residual symptoms. Has been vaccinated. Labs drawn that were ordered at prior appointment and not obtained. Yoselin Mendoza received counseling on the following healthy behaviors: nutrition, exercise and medication adherence  Reviewed prior labs and health maintenance. Continue current medications, diet and exercise. Discussed use, benefit, and side effects of prescribed medications. Barriers to medication compliance addressed. Patient given educational materials - see patient instructions. All patient questions answered. Patient voiced understanding. Return if symptoms worsen or fail to improve.            Electronically signed by Maddi Leung MD on 4/8/21 at 10:00 AM EDT

## 2021-04-20 LAB — SURGICAL PATHOLOGY REPORT: NORMAL

## 2021-04-21 DIAGNOSIS — M25.561 RIGHT KNEE PAIN, UNSPECIFIED CHRONICITY: Primary | ICD-10-CM

## 2021-04-22 ENCOUNTER — OFFICE VISIT (OUTPATIENT)
Dept: ORTHOPEDIC SURGERY | Age: 56
End: 2021-04-22
Payer: OTHER GOVERNMENT

## 2021-04-22 VITALS
HEIGHT: 68 IN | WEIGHT: 165 LBS | SYSTOLIC BLOOD PRESSURE: 139 MMHG | HEART RATE: 53 BPM | DIASTOLIC BLOOD PRESSURE: 83 MMHG | BODY MASS INDEX: 25.01 KG/M2

## 2021-04-22 DIAGNOSIS — M22.2X2 PATELLOFEMORAL PAIN SYNDROME OF BOTH KNEES: Primary | ICD-10-CM

## 2021-04-22 DIAGNOSIS — M22.2X1 PATELLOFEMORAL PAIN SYNDROME OF BOTH KNEES: Primary | ICD-10-CM

## 2021-04-22 PROCEDURE — 99202 OFFICE O/P NEW SF 15 MIN: CPT | Performed by: ORTHOPAEDIC SURGERY

## 2021-04-22 ASSESSMENT — ENCOUNTER SYMPTOMS
DIARRHEA: 0
ABDOMINAL PAIN: 0
NAUSEA: 0
CONSTIPATION: 0
APNEA: 0
CHEST TIGHTNESS: 0
SHORTNESS OF BREATH: 0
COLOR CHANGE: 0
ABDOMINAL DISTENTION: 0
VOMITING: 0
RESPIRATORY NEGATIVE: 1
COUGH: 0

## 2021-04-22 NOTE — PATIENT INSTRUCTIONS
discomfort under your kneecap, place a small towel roll under your knee.)  3. Hold for about 6 seconds, then rest for up to 10 seconds. 4. Do 8 to 12 repetitions several times a day. Straight-leg raises to the front   1. Lie on your back with your good knee bent so that your foot rests flat on the floor. Your injured leg should be straight. Make sure that your low back has a normal curve. You should be able to slip your flat hand in between the floor and the small of your back, with your palm touching the floor and your back touching the back of your hand. 2. Tighten the thigh muscles in the injured leg by pressing the back of your knee flat down to the floor. Hold your knee straight. 3. Keeping the thigh muscles tight, lift your injured leg up so that your heel is about 12 inches off the floor. Hold for about 6 seconds and then lower slowly. 4. Do 8 to 12 repetitions, 3 times a day. Straight-leg raises to the outside   1. Lie on your side, with your injured leg on top. 2. Tighten the front thigh muscles of your injured leg to keep your knee straight. 3. Keep your hip and your leg straight in line with the rest of your body, and keep your knee pointing forward. Do not drop your hip back. 4. Lift your injured leg straight up toward the ceiling, about 12 inches off the floor. Hold for about 6 seconds, then slowly lower your leg. 5. Do 8 to 12 repetitions. Straight-leg raises to the back   1. Lie on your stomach, and lift your leg straight up behind you (toward the ceiling). 2. Lift your toes about 6 inches off the floor, hold for about 6 seconds, then lower slowly. 3. Do 8 to 12 repetitions. Straight-leg raises to the inside   1. Lie on the side of your body with the injured leg. 2. You can either prop your other (good) leg up on a chair, or you can bend your good knee and put that foot in front of your injured knee. Do not drop your hip back.   3. Tighten the muscles on the front of your thigh inches. Your heels should remain on the floor at all times. 4. Rise slowly to a standing position. Heel raises   1. Stand with your feet a few inches apart, with your hands lightly resting on a counter or chair in front of you. 2. Slowly raise your heels off the floor while keeping your knees straight. 3. Hold for about 6 seconds, then slowly lower your heels to the floor. 4. Do 8 to 12 repetitions several times during the day. Follow-up care is a key part of your treatment and safety. Be sure to make and go to all appointments, and call your doctor if you are having problems. It's also a good idea to know your test results and keep a list of the medicines you take. Where can you learn more? Go to https://[employer]."[employer]". org and sign in to your [employer] account. Enter P304 in the [employer] box to learn more about \"Knee: Exercises. \"     If you do not have an account, please click on the \"Sign Up Now\" link. Current as of: November 16, 2020               Content Version: 12.8  © 0911-6653 [employer]. Care instructions adapted under license by [facility] ([facility]). If you have questions about a medical condition or this instruction, always ask your healthcare professional. [address] any warranty or liability for your use of this information.

## 2021-04-22 NOTE — PROGRESS NOTES
[facility] [address] AND SPORTS MEDICINE  [address] [place] [id number]  Dept: [phone]  Dept Fax: [phone]          Bilateral Knee - New Patient     Subjective:     Chief Complaint   Patient presents with    Knee Pain     Right knee pain     HPI:     Deangelo Shepherd presents today for Bilateral knee pain. Occupation is a  at the [employer]. The pain has been present for 2 weeks. The patient recalls no specific injury. The patient has tried rest and a brace with moderate improvement. He also decrease the amount of weight he is carrying around at the job. The pain is now described as Dull . There is not pain on weight bearing. The knee has swelled. There is not painful popping and clicking. The knee has not caught or locked up. The knee has not given out. It is not stiff upon arising from sitting. It is  painful to go up and down stairs and sit for a prolonged time. The patient has not had a cortisone injection. The patient has not tried a lubrication injection. The patient has not tried physical therapy. The patient has not had surgery. He states he had Covid a couple of months ago and now is trying to get back in shape. He began running and when he did that his knees swelled and were uncomfortable. He went to his family doctor Dr. Rosmery Sahu and she referred him to our office. ROS:   Review of Systems   Constitutional: Positive for activity change. Negative for appetite change, fatigue and fever. Respiratory: Negative. Negative for apnea, cough, chest tightness and shortness of breath. Cardiovascular: Negative. Negative for chest pain, palpitations and leg swelling. Gastrointestinal: Negative for abdominal distention, abdominal pain, constipation, diarrhea, nausea and vomiting. Genitourinary: Negative for difficulty urinating, dysuria and hematuria. Musculoskeletal: Positive for arthralgias, gait problem and joint swelling. strain: Not hard at all    Food insecurity     Worry: Never true     Inability: Never true    Transportation needs     Medical: No     Non-medical: No   Tobacco Use    Smoking status: Never Smoker    Smokeless tobacco: Never Used   Substance and Sexual Activity    Alcohol use: Yes     Comment: 1 or 2 beers a week    Drug use: No    Sexual activity: Yes     Partners: Female   Lifestyle    Physical activity     Days per week: None     Minutes per session: None    Stress: None   Relationships    Social connections     Talks on phone: None     Gets together: None     Attends [employer]ist service: None     Active member of club or organization: None     Attends meetings of clubs or organizations: None     Relationship status: None    Intimate partner violence     Fear of current or ex partner: None     Emotionally abused: None     Physically abused: None     Forced sexual activity: None   Other Topics Concern    None   Social History Narrative    None       Family History:  Family History   Problem Relation Age of Onset    Heart Disease Mother     Pacemaker Mother     Thyroid Disease Mother     Cancer Father     Stroke Father     Lung Cancer Father     Brain Cancer Father     High Blood Pressure Sister     Thyroid Disease Brother     Thyroid Disease Brother        Vitals:   /83   Pulse 53   Ht 5' 8\" (1.727 m)   Wt 165 lb (74.8 kg)   BMI 25.09 kg/m²  Body mass index is 25.09 kg/m². Physical Examination:     Orthopedics:    GENERAL: Alert and oriented X3 in no acute distress. SKIN: Intact without lesions or ulcerations. NEURO: Intact to sensory and motor testing. VASC: Capillary refill is less than 3 seconds. KNEE EXAM    LOCATION: Bilateral Knee  GEN: Alert and oriented X 3, in no acute distress. GAIT: The patient's gait was observed while entering the exam room and was noted to be non antalgic. The extremity is in slight varus alignment.   SKIN: Intact without rashes, lesions, or ulcerations. No obvious deformity or swelling. NEURO: The patient responds to light touch throughout bilateral LE. Patellar and Achilles reflexes are 2/4. VASC: The bilateral LE is neurovascularly intact with 2/4 DP and 2/4 PT pulses. Brisk capillary refill. ROM: 0/136 degrees. There is no effusion. MUSC: good quad tone  LIGAMENT: Lachman's test is Negative with Good endpoint. Anterior drawer Negative. Posterior drawer Negative. There is No varus instability at 0 degrees and No varus instability at 30 degrees. There is No valgus instability at 0 degrees and No valgus instability at 30 degrees. SPECIAL: Etta test is negative with no clunks, + crepitation, and no pain. PALP: There is no joint line pain. Assessment:     1. Patellofemoral pain syndrome of both knees      Procedures:    Procedure: no  Radiology:   KNEE X-RAY    4 views of the bilateral knees including AP, bilateral tunnel, and lateral in the upright position, and skyline views reveal anatomic alignment with no fracture or dislocation. Kellgren grade I changes of osteoarthritis (joint space narrowing, osteophyte, subchondral sclerosis, bony deformity/cyst) of the patellofemoral compartment(s). No osseous loose bodies. No bony erosion or periosteal reaction. No soft tissue masses. Patella stanford noted. Impression: Patella stanford of the bilateral knees. Plan:   Treatment : I reviewed the X-ray with the patient and I informed them that the x-rays show some very mild arthritic changes of the patellofemoral joint. He also has something called patella stanford. We discussed the etiologies and natural histories of patellofemoral pain syndrome with some mild arthritic changes. We discussed the various treatment alternatives including anti-inflammatory medications, physical therapy, injections, further imaging studies and as a last result surgery.  During today's visit, we discussed that I really do not think he needs any treatment at this time since decreasing his activity has helped. I believe that he probably just started working out without gradually increasing his activity which aggravated his knees. I think the quickest thing to get him better would be going to physical therapy. If he is not getting better we can consider getting an MRI of his right knee which seems to be the one that bothers him the most.  Overall the patient states he is feeling much better than he was a couple of weeks ago. We discussed alternate types of cardiac exercise including elliptical, biking and swimming. We also discussed footwear and making sure that he has a good supportive shoe when he is running. If his knees continue to bother him after going to physical therapy and changing his workout routines, we will consider getting an MRI. We will also give him a copy of the NSAID protocol that he can follow if his knees start to get aggravated again. I think an overall stretching routine would be a good idea also since he tends to have tight hamstrings. [facility] also has a running program that analyzes your gait that may be helpful. Patient should return to the clinic as needed to follow up with Aron Nielsen D.O. . The patient will call the office immediately with any problems. No orders of the defined types were placed in this encounter. Orders Placed This Encounter   Procedures    XR KNEE LEFT (1-2 VIEWS)     Standing Status:   Future     Number of Occurrences:   1     Standing Expiration Date:   4/22/2022     Order Specific Question:   Reason for exam:     Answer:   pain    [facility]     Referral Priority:   Routine     Referral Type:   Eval and Treat     Referral Reason:   Specialty Services Required     Requested Specialty:   Physical Therapy     Number of Visits Requested:   1       Diane COBURN PA-C am scribing for and in the presence of Aron BUITRAGO. 4/22/2021  6:07 PM      Aron COBURN DO, have personally seen this patient and I have reviewed the CC, PMH, FHX and Social History as provided by other clinical staff. I reassessed the HPI and ROS as scribed by Dominique Wilson PA-C in my presence and it is both accurate and complete. Thereafter, I personally performed the PE, reviewed the imaging and established the DX and POC. I agree with the documentation provided by the Physician Assistant. I have reviewed all documentation in its entirety prior to providing my signature indicating agreement. Any areas of disagreement are noted on the chart.     Electronically signed by Tomas Choudhury DO on 4/24/2021 at 9:27 PM        Electronically signed by Angella Degroot PA-C, on 4/22/2021 at 6:07 PM

## 2021-04-26 ENCOUNTER — OFFICE VISIT (OUTPATIENT)
Dept: GASTROENTEROLOGY | Age: 56
End: 2021-04-26
Payer: OTHER GOVERNMENT

## 2021-04-26 VITALS
WEIGHT: 168.6 LBS | SYSTOLIC BLOOD PRESSURE: 132 MMHG | BODY MASS INDEX: 25.55 KG/M2 | HEIGHT: 68 IN | DIASTOLIC BLOOD PRESSURE: 76 MMHG

## 2021-04-26 DIAGNOSIS — R19.7 DIARRHEA, UNSPECIFIED TYPE: Primary | ICD-10-CM

## 2021-04-26 DIAGNOSIS — K57.30 DIVERTICULOSIS OF COLON: ICD-10-CM

## 2021-04-26 DIAGNOSIS — D12.6 TUBULAR ADENOMA OF COLON: ICD-10-CM

## 2021-04-26 DIAGNOSIS — R19.8 CHANGE IN BOWEL FUNCTION: ICD-10-CM

## 2021-04-26 PROCEDURE — 99213 OFFICE O/P EST LOW 20 MIN: CPT | Performed by: INTERNAL MEDICINE

## 2021-04-26 ASSESSMENT — ENCOUNTER SYMPTOMS
ABDOMINAL PAIN: 0
ANAL BLEEDING: 0
BLOOD IN STOOL: 0
VOMITING: 0
NAUSEA: 0
ABDOMINAL DISTENTION: 0
RESPIRATORY NEGATIVE: 1
TROUBLE SWALLOWING: 0
RECTAL PAIN: 0
DIARRHEA: 0
CONSTIPATION: 0

## 2021-04-26 NOTE — PROGRESS NOTES
GI OFFICE FOLLOW UP    Tonya Arroyo is a 48 y.o. male evaluated via on 4/26/2021. Consent:  He and/or health care decision maker is aware that that he may receive a bill for this telephone service, depending on his insurance coverage, and has provided verbal consent to proceed: YES      INTERVAL HISTORY:   No referring provider defined for this encounter. Chief Complaint   Patient presents with    Follow-up     Patient is a colon f/u. Denies new symptoms. 1. Diarrhea, unspecified type    2. Change in bowel function    3. Tubular adenoma of colon    4. Diverticulosis of colon       The patient is here as a follow up of his recent GI procedure. The results have been sent to you separately   The findings were explained to the patient in detail and biopsies were also discussed   with him    This patient had recent colonoscopy by me secondary history for some nonspecific diarrhea abdominal bloating and irritable bowel syndrome-like symptoms he was found to have 4 mm tubular adenoma some diverticulosis hemorrhoids he feeling little bit better still complains some off-and-on diarrhea      Denies any rectal bleeding melanotic stools  Some anxiety issues        HISTORY OF PRESENT ILLNESS: Andrew Diaz is a 48 y.o. male with a past history remarkable for , referred for evaluation of   Chief Complaint   Patient presents with    Follow-up     Patient is a colon f/u. Denies new symptoms. .    Past Medical,Family, and Social History reviewed and does contribute to the patient presenting condition. Patient's PMH/PSH,SH,PSYCH Hx, MEDs, ALLERGIES, and ROS were all reviewed and updated in the appropriate sections.     PAST MEDICAL HISTORY:  Past Medical History:   Diagnosis Date    Allergic rhinitis     Change in bowel function     Chronic fatigue     Combined arterial insufficiency and corporo-venous Substance and Sexual Activity    Alcohol use: Yes     Comment: 1 or 2 beers a week    Drug use: No    Sexual activity: Yes     Partners: Female   Lifestyle    Physical activity     Days per week: Not on file     Minutes per session: Not on file    Stress: Not on file   Relationships    Social connections     Talks on phone: Not on file     Gets together: Not on file     Attends Anglican service: Not on file     Active member of club or organization: Not on file     Attends meetings of clubs or organizations: Not on file     Relationship status: Not on file    Intimate partner violence     Fear of current or ex partner: Not on file     Emotionally abused: Not on file     Physically abused: Not on file     Forced sexual activity: Not on file   Other Topics Concern    Not on file   Social History Narrative    Not on file         REVIEW OF SYSTEMS:         Review of Systems   Constitutional: Negative for appetite change. HENT: Negative. Negative for trouble swallowing. Respiratory: Negative. Gastrointestinal: Negative for abdominal distention, abdominal pain, anal bleeding, blood in stool, constipation, diarrhea (daily), nausea, rectal pain and vomiting. PHYSICAL EXAMINATION: Vital signs reviewed per the nursing documentation. /76   Ht 5' 8\" (1.727 m)   Wt 168 lb 9.6 oz (76.5 kg)   BMI 25.64 kg/m²   Body mass index is 25.64 kg/m². Physical Exam  Nursing note reviewed. Constitutional:       Appearance: He is well-developed. Comments: Anxious    HENT:      Head: Normocephalic and atraumatic. Eyes:      Conjunctiva/sclera: Conjunctivae normal.      Pupils: Pupils are equal, round, and reactive to light. Neck:      Musculoskeletal: Normal range of motion and neck supple. Cardiovascular:      Rate and Rhythm: Normal rate and regular rhythm. Pulmonary:      Effort: Pulmonary effort is normal.      Breath sounds: Normal breath sounds.    Abdominal:      General: Bowel sounds are normal.      Palpations: Abdomen is soft. Comments: NON TENDER, NON DISTENTED  LIVER SPLEEN AND HERNIAS ARE NOT  PALPABLE  BOWEL SOUNDS ARE POSITIVE      Genitourinary:     Rectum: Normal.   Musculoskeletal: Normal range of motion. Skin:     General: Skin is warm. Neurological:      Mental Status: He is alert and oriented to person, place, and time. Deep Tendon Reflexes: Reflexes are normal and symmetric. LABORATORY DATA: Reviewed  Lab Results   Component Value Date    WBC 3.6 04/08/2021    HGB 14.9 04/08/2021    HCT 44.0 04/08/2021    MCV 89.2 04/08/2021     04/08/2021     04/08/2021    K 4.7 04/08/2021     04/08/2021    CO2 27 04/08/2021    BUN 12 04/08/2021    CREATININE 0.93 04/08/2021    LABALBU 4.4 04/08/2021    BILITOT 0.67 04/08/2021    ALKPHOS 60 04/08/2021    AST 27 04/08/2021    ALT 24 04/08/2021         Lab Results   Component Value Date    RBC 4.93 04/08/2021    HGB 14.9 04/08/2021    MCV 89.2 04/08/2021    MCH 30.2 04/08/2021    MCHC 33.9 04/08/2021    RDW 12.9 04/08/2021    MPV 10.5 04/08/2021    BASOPCT 1 04/08/2021    LYMPHSABS 1.11 04/08/2021    MONOSABS 0.49 04/08/2021    NEUTROABS 1.87 04/08/2021    EOSABS 0.07 04/08/2021    BASOSABS 0.05 04/08/2021         DIAGNOSTIC TESTING:     Xr Knee Left (1-2 Views)    Result Date: 4/25/2021  KNEE X-RAY   4 views of the bilateral knees including AP, bilateral tunnel, and lateral in the upright position, and skyline views reveal anatomic alignment with no fracture or dislocation. Kellgren grade I changes of osteoarthritis (joint space narrowing, osteophyte, subchondral sclerosis, bony deformity/cyst) of the patellofemoral compartment(s). No osseous loose bodies. No bony erosion or periosteal reaction. No soft tissue masses. Patella stanford noted.   Impression: Patella stanford of the bilateral knees.     Xr Knee Right (min 4 Views)    Result Date: 4/25/2021  KNEE X-RAY   4 views of the bilateral knees including AP, bilateral tunnel, and lateral in the upright position, and skyline views reveal anatomic alignment with no fracture or dislocation. Kellgren grade I changes of osteoarthritis (joint space narrowing, osteophyte, subchondral sclerosis, bony deformity/cyst) of the patellofemoral compartment(s). No osseous loose bodies. No bony erosion or periosteal reaction. No soft tissue masses. Patella stanford noted.   Impression: Patella stanford of the bilateral knees. Assessment  1. Diarrhea, unspecified type    2. Change in bowel function    3. Tubular adenoma of colon    4. Diverticulosis of colon        Plan    Pt was advised in detail about some life style and dietary modifications. He was advised about avoidance of caffeine, nicotine and chocolate. Pt was also told to stay away from any kind of fast foods, soda pops. He was also advised to avoid lots of spices, grease and fried food etc.     Instructions were also given about trying to arrange the timing, quality and quantity of food. Instructions were given about using ample amount of fiber including dietary and supplemental fiber either metamucil, bennafiber or citrucell etc.  Pt was advised about drinking ample amount of water without any colors or chemicals. Stress was given about regular exercise. Pt has verbalized understanding and agreement to these modifications.       The patient was instructed to start taking some OTC Probiotics products   These are available over the counter at the Pharmacy stores and Grocery stores  He was explained about the beneficial effects they have in the GI track  They will help to establish the good bacterial anamaria and will help with the digestion and bowel movements  The patient has verbalized understanding and agreement to this plan    More than half of patient's clinic visit time was spent in counseling about lifestyle and dietary modifications  Patient's  questions were answered in this regard as well  The patient has verbalized understanding and agreement       I communicated with the patient and/or health care decision maker about   Details of this discussion including any medical advice provided:YES      I affirm this is a Patient Initiated Episode with an Established Patient who has not had a related appointment within my department in the past 7 days or scheduled within the next 24 hours. Total Time: minutes: 21-30 minutes    Note: not billable if this call serves to triage the patient into an appointment for the relevant concern      Thank you for allowing me to participate in the care of Mr. Harrington. For any further questions please do not hesitate to contact me. I have reviewed and agree with the ROS entered by the MA/LPN.          Georgia Allen MD, [facility]  Board Certified in Gastroenterology and [address] [place] [address] Gastroenterology  Office #: [phone]

## 2021-05-14 ENCOUNTER — OFFICE VISIT (OUTPATIENT)
Dept: FAMILY MEDICINE CLINIC | Age: 56
End: 2021-05-14
Payer: OTHER GOVERNMENT

## 2021-05-14 VITALS
HEART RATE: 52 BPM | TEMPERATURE: 97.4 F | OXYGEN SATURATION: 98 % | SYSTOLIC BLOOD PRESSURE: 124 MMHG | WEIGHT: 173.4 LBS | BODY MASS INDEX: 26.37 KG/M2 | DIASTOLIC BLOOD PRESSURE: 78 MMHG

## 2021-05-14 DIAGNOSIS — R25.2 MUSCLE CRAMPS: ICD-10-CM

## 2021-05-14 DIAGNOSIS — M79.672 LEFT FOOT PAIN: Primary | ICD-10-CM

## 2021-05-14 PROCEDURE — 99213 OFFICE O/P EST LOW 20 MIN: CPT | Performed by: FAMILY MEDICINE

## 2021-05-14 ASSESSMENT — ENCOUNTER SYMPTOMS
EYES NEGATIVE: 1
ALLERGIC/IMMUNOLOGIC NEGATIVE: 1
COUGH: 0
SHORTNESS OF BREATH: 0

## 2021-05-14 NOTE — PROGRESS NOTES
[facility]  [facility]  [address]  [facility] [address]  Dept: [phone]    5/14/2021    CHIEF COMPLAINT    Chief Complaint   Patient presents with    Foot Pain     left       HPI    Martin Mcneil is a 48 y.o. male who presents   Chief Complaint   Patient presents with    Foot Pain     left   . Recurrent pain in left foot, heel and ball of foot with intermittent contractures of toes. Sx start with walking even half a mile. When he stretches the cramping starts. Used to get cramps in his calves at night that were painful and caused toe contractures. Has been to ortho for knee pain, suggested nsaid, could do PT. he did not start taking an NSAID advised to start his running plan slowly. He does drink electrolyte solution when exercising. He is doing regular stretching and weights with kettle bells. Foot Pain   The pain is present in the left foot and left toes. This is a new problem. The current episode started 1 to 4 weeks ago. There has been no history of extremity trauma. The problem occurs daily. The problem has been unchanged. The pain is moderate. Associated symptoms include stiffness. Pertinent negatives include no fever or limited range of motion. He has tried rest (Stretching) for the symptoms. The treatment provided mild relief. His past medical history is significant for osteoarthritis (  Mild in both knees). Vitals:    05/14/21 1027   BP: 124/78   Pulse: 52   Temp: 97.4 °F (36.3 °C)   SpO2: 98%   Weight: 173 lb 6.4 oz (78.7 kg)       REVIEW OF SYSTEMS    Review of Systems   Constitutional: Negative for fatigue, fever and unexpected weight change. HENT: Negative. Eyes: Negative. Respiratory: Negative for cough and shortness of breath. Cardiovascular: Negative for chest pain and leg swelling. Endocrine: Negative. Musculoskeletal: Positive for arthralgias and stiffness. See hpi   Skin: Negative. None    Intimate partner violence     Fear of current or ex partner: None     Emotionally abused: None     Physically abused: None     Forced sexual activity: None   Other Topics Concern    None   Social History Narrative    None       SURGICAL HISTORY    Past Surgical History:   Procedure Laterality Date    COLONOSCOPY N/A 4/19/2021    COLONOSCOPY POLYPECTOMY performed by Kathleen Spears MD at [place] [address]    Current Outpatient Medications   Medication Sig Dispense Refill    montelukast (SINGULAIR) 10 MG tablet TAKE ONE TABLET BY MOUTH ONCE NIGHTLY 30 tablet 4    azelastine (ASTELIN) 0.1 % nasal spray 2 sprays by Nasal route 2 times daily 2 Spray in each nostril 4 Bottle 1    levothyroxine (SYNTHROID) 125 MCG tablet TAKE ONE TABLET BY MOUTH DAILY 30 tablet 5    Cetirizine HCl (ZYRTEC ALLERGY) 10 MG CAPS        No current facility-administered medications for this visit. ALLERGIES    Allergies   Allergen Reactions    Codeine     Requip [Ropinirole Hcl]        PHYSICAL EXAM   Physical Exam  Vitals signs reviewed. Constitutional:       Appearance: He is normal weight. Eyes:      Pupils: Pupils are equal, round, and reactive to light. Cardiovascular:      Rate and Rhythm: Normal rate. Pulmonary:      Effort: Pulmonary effort is normal.   Musculoskeletal:         General: Tenderness (left foot heel and ball of foot) present. Skin:     General: Skin is warm and dry. Neurological:      Mental Status: He is alert. Psychiatric:         Mood and Affect: Mood normal.         Thought Content: Thought content normal.         Judgment: Judgment normal.         ASSESSMENT/PLAN  1. Left foot pain  Continue regular stretching, icing if painful. Continue using shoe inserts. Did discuss seeing a foot specialist or physical therapy if persistent or worsening symptoms.   As he starts into his running program he will gradually increase his distance and speed    2. Muscle cramps  Increase electrolyte solution and add a magnesium supplement daily. Continue regular stretching       Stephanie Son received counseling on the following healthy behaviors: nutrition, exercise and medication adherence  Reviewed prior labs and health maintenance. Continue current medications, diet and exercise. Discussed use, benefit, and side effects of prescribed medications. Barriers to medication compliance addressed. Patient given educational materials - see patient instructions. All patient questions answered. Patient voiced understanding. Return if symptoms worsen or fail to improve.         Electronically signed by Ruthy Sharp MD on 5/14/21 at 10:33 AM EDT

## 2021-05-16 RX ORDER — MONTELUKAST SODIUM 10 MG/1
TABLET ORAL
Qty: 30 TABLET | Refills: 5 | Status: SHIPPED | OUTPATIENT
Start: 2021-05-16 | End: 2021-11-08

## 2021-06-14 RX ORDER — LEVOTHYROXINE SODIUM 0.12 MG/1
TABLET ORAL
Qty: 30 TABLET | Refills: 4 | Status: SHIPPED | OUTPATIENT
Start: 2021-06-14 | End: 2022-09-28 | Stop reason: SDUPTHER

## 2021-08-04 ENCOUNTER — VIRTUAL VISIT (OUTPATIENT)
Dept: FAMILY MEDICINE CLINIC | Age: 56
End: 2021-08-04
Payer: OTHER GOVERNMENT

## 2021-08-04 DIAGNOSIS — J06.9 VIRAL URI: Primary | ICD-10-CM

## 2021-08-04 DIAGNOSIS — J30.89 NON-SEASONAL ALLERGIC RHINITIS, UNSPECIFIED TRIGGER: ICD-10-CM

## 2021-08-04 PROCEDURE — 99213 OFFICE O/P EST LOW 20 MIN: CPT | Performed by: FAMILY MEDICINE

## 2021-08-04 ASSESSMENT — ENCOUNTER SYMPTOMS
COUGH: 0
ALLERGIC/IMMUNOLOGIC NEGATIVE: 1
DIARRHEA: 0
EYES NEGATIVE: 1
CONSTIPATION: 0
ABDOMINAL PAIN: 0
SHORTNESS OF BREATH: 0

## 2021-08-04 NOTE — PROGRESS NOTES
[facility]  [facility]  [address]  [facility] [address]  Dept: [phone]    2021    TELEHEALTH EVALUATION -- Audio/Visual (During PZTEB-51 public health emergency)  Narcisa Seay (:  1965) has requested an audio/video evaluation for the following concern(s):    HPI:  Video visit to discuss headache that lasted all night 2 nights ago. Could not get to sleep. Had to call off work yesterday but did return today. Has had chest congestion for the past few days. No fever. Had some body aches yesterday. Slept okay last night. Mild random cough with some sputum. No covid exposure. Has been vaccinated. Tested positive in march with mild sx. Cough is worse after taking a hot shower. Taking otc zyrtec and singulair for allergy sx. Headache   This is a new problem. The current episode started in the past 7 days. The problem has been resolved. The pain is located in the frontal region. The pain quality is similar to prior headaches. Pertinent negatives include no abdominal pain, coughing, dizziness or fever. There were no vitals filed for this visit. REVIEW OF SYSTEMS:   Review of Systems   Constitutional: Negative for fatigue, fever and unexpected weight change. HENT: Negative. Eyes: Negative. Respiratory: Negative for cough and shortness of breath. Cardiovascular: Negative for chest pain and leg swelling. Gastrointestinal: Negative for abdominal pain, constipation and diarrhea. Endocrine: Negative. Genitourinary: Negative for frequency and urgency. Musculoskeletal: Positive for myalgias. Skin: Negative. Allergic/Immunologic: Negative. Neurological: Positive for headaches. Negative for dizziness. Hematological: Negative. Psychiatric/Behavioral: Negative for sleep disturbance. The patient is not nervous/anxious.         PAST MEDICAL HISTORY:    Past Medical History:   Diagnosis Date    Allergic rhinitis  Change in bowel function     Chronic fatigue     Combined arterial insufficiency and corporo-venous occlusive erectile dysfunction     COVID-19 02/2021    Excessive daytime sleepiness     Hypothyroidism     Dr. Kiana Islas Mixed hyperlipidemia     Screen for colon cancer     Sleep apnea with use of continuous positive airway pressure (CPAP)     Snoring        FAMILY HISTORY:    Family History   Problem Relation Age of Onset    Heart Disease Mother     Pacemaker Mother     Thyroid Disease Mother     Cancer Father     Stroke Father     Lung Cancer Father     Brain Cancer Father     High Blood Pressure Sister     Thyroid Disease Brother     Thyroid Disease Brother        SOCIAL HISTORY:    Social History     Socioeconomic History    Marital status:      Spouse name: Not on file    Number of children: Not on file    Years of education: Not on file    Highest education level: Not on file   Occupational History    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, 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[place], [facility]   Tobacco Use    Smoking status: Never Smoker    Smokeless tobacco: Never Used   Vaping Use    Vaping Use: Never used   Substance and Sexual Activity    Alcohol use: Yes     Comment: 1 or 2 beers a week    Drug use: No    Sexual activity: Yes     Partners: Female   Other Topics Concern    Not on file   Social History Narrative    Not on file     Social Determinants of Health     Financial Resource Strain: Low Risk     Difficulty of Paying Living Expenses: Not hard at all   Food Insecurity: No Food Insecurity    Worried About [address] in the Last Year: Never true    [address] in the Last Year: Never true   Transportation Needs: No Transportation Needs    Lack of Transportation (Medical): No    Lack of Transportation (Non-Medical):  No   Physical Activity:     Days of Exercise per Week:     Minutes of Exercise per Session:    Stress:     Feeling of Stress : Social Connections:     Frequency of Communication with Friends and Family:     Frequency of Social Gatherings with Friends and Family:     Attends Yarsanism Services:     Active Member of Clubs or Organizations:     Attends Club or Organization Meetings:     Marital Status:    Intimate Partner Violence:     Fear of Current or Ex-Partner:     Emotionally Abused:     Physically Abused:     Sexually Abused:        SURGICAL HISTORY:    Past Surgical History:   Procedure Laterality Date    COLONOSCOPY N/A 4/19/2021    COLONOSCOPY POLYPECTOMY performed by Mireya Kern MD at [facility] 112:    Current Outpatient Medications   Medication Sig Dispense Refill    levothyroxine (SYNTHROID) 125 MCG tablet TAKE ONE TABLET BY MOUTH DAILY 30 tablet 4    montelukast (SINGULAIR) 10 MG tablet TAKE ONE TABLET BY MOUTH ONCE NIGHTLY 30 tablet 5    azelastine (ASTELIN) 0.1 % nasal spray 2 sprays by Nasal route 2 times daily 2 Spray in each nostril 4 Bottle 1    Cetirizine HCl (ZYRTEC ALLERGY) 10 MG CAPS        No current facility-administered medications for this visit. ALLERGIES:   Allergies   Allergen Reactions    Codeine     Requip [Ropinirole Hcl]        PHYSICAL EXAM:   Physical Exam  Constitutional:       Appearance: Normal appearance. HENT:      Head: Normocephalic. Eyes:      Conjunctiva/sclera: Conjunctivae normal.   Pulmonary:      Effort: Pulmonary effort is normal.   Musculoskeletal:         General: Normal range of motion. Cervical back: Normal range of motion. Neurological:      General: No focal deficit present. Mental Status: He is alert and oriented to person, place, and time. Mental status is at baseline. Psychiatric:         Mood and Affect: Mood normal.         Behavior: Behavior normal.         Thought Content: Thought content normal.         Judgment: Judgment normal.          ASSESSMENT/PLAN:  1.  Viral URI  Continue to treat symptoms. Add Mucinex or Robitussin-DM. Stay well-hydrated. If symptoms worsen could consider antibiotic in the next 3 to 4 days. 2. Non-seasonal allergic rhinitis, unspecified trigger  Continue medications and hydration. Call if worsening of symptoms      Return if symptoms worsen or fail to improve. Evy Lopez is a 48 y.o. male being evaluated by a Virtual Visit (video visit) encounter to address concerns as mentioned above. A caregiver was present when appropriate. Due to this being a TeleHealth encounter (During CKZ-20 public health emergency), evaluation of the following organ systems was limited: Vitals/Constitutional/EENT/Resp/CV/GI//MS/Neuro/Skin/Heme-Lymph-Imm. Pursuant to the emergency declaration under the [address] [place] [address] authority and the [employer] Act, this Virtual Visit was conducted with patient's (and/or legal guardian's) consent, to reduce the patient's risk of exposure to COVID-19 and provide necessary medical care. The patient (and/or legal guardian) has also been advised to contact this office for worsening conditions or problems, and seek emergency medical treatment and/or call 911 if deemed necessary. Services were provided through a video synchronous discussion virtually to substitute for in-person clinic visit. Patient and provider were located at their individual homes. --Major Kerr MD on 8/4/2021 at 10:00 AM    An electronic signature was used to authenticate this note.

## 2021-08-12 ENCOUNTER — TELEMEDICINE (OUTPATIENT)
Dept: FAMILY MEDICINE CLINIC | Age: 56
End: 2021-08-12
Payer: OTHER GOVERNMENT

## 2021-08-12 ENCOUNTER — TELEPHONE (OUTPATIENT)
Dept: FAMILY MEDICINE CLINIC | Age: 56
End: 2021-08-12

## 2021-08-12 DIAGNOSIS — J20.9 ACUTE BRONCHITIS, UNSPECIFIED ORGANISM: Primary | ICD-10-CM

## 2021-08-12 DIAGNOSIS — J30.89 NON-SEASONAL ALLERGIC RHINITIS, UNSPECIFIED TRIGGER: ICD-10-CM

## 2021-08-12 DIAGNOSIS — J30.89 NON-SEASONAL ALLERGIC RHINITIS, UNSPECIFIED TRIGGER: Primary | ICD-10-CM

## 2021-08-12 PROCEDURE — 99213 OFFICE O/P EST LOW 20 MIN: CPT | Performed by: FAMILY MEDICINE

## 2021-08-12 RX ORDER — AMOXICILLIN 875 MG/1
875 TABLET, COATED ORAL 2 TIMES DAILY
Qty: 20 TABLET | Refills: 0 | Status: SHIPPED | OUTPATIENT
Start: 2021-08-12 | End: 2021-08-22

## 2021-08-12 RX ORDER — AZELASTINE 1 MG/ML
2 SPRAY, METERED NASAL 2 TIMES DAILY
Qty: 4 BOTTLE | Refills: 1 | Status: SHIPPED | OUTPATIENT
Start: 2021-08-12 | End: 2022-10-14

## 2021-08-12 SDOH — ECONOMIC STABILITY: FOOD INSECURITY: WITHIN THE PAST 12 MONTHS, THE FOOD YOU BOUGHT JUST DIDN'T LAST AND YOU DIDN'T HAVE MONEY TO GET MORE.: NEVER TRUE

## 2021-08-12 SDOH — ECONOMIC STABILITY: FOOD INSECURITY: WITHIN THE PAST 12 MONTHS, YOU WORRIED THAT YOUR FOOD WOULD RUN OUT BEFORE YOU GOT MONEY TO BUY MORE.: NEVER TRUE

## 2021-08-12 SDOH — ECONOMIC STABILITY: TRANSPORTATION INSECURITY
IN THE PAST 12 MONTHS, HAS LACK OF TRANSPORTATION KEPT YOU FROM MEETINGS, WORK, OR FROM GETTING THINGS NEEDED FOR DAILY LIVING?: NO

## 2021-08-12 ASSESSMENT — PATIENT HEALTH QUESTIONNAIRE - PHQ9
SUM OF ALL RESPONSES TO PHQ QUESTIONS 1-9: 0
SUM OF ALL RESPONSES TO PHQ QUESTIONS 1-9: 0
2. FEELING DOWN, DEPRESSED OR HOPELESS: 0
SUM OF ALL RESPONSES TO PHQ QUESTIONS 1-9: 0
SUM OF ALL RESPONSES TO PHQ9 QUESTIONS 1 & 2: 0
1. LITTLE INTEREST OR PLEASURE IN DOING THINGS: 0

## 2021-08-12 ASSESSMENT — SOCIAL DETERMINANTS OF HEALTH (SDOH): HOW HARD IS IT FOR YOU TO PAY FOR THE VERY BASICS LIKE FOOD, HOUSING, MEDICAL CARE, AND HEATING?: NOT HARD AT ALL

## 2021-08-12 ASSESSMENT — ENCOUNTER SYMPTOMS
COUGH: 1
WHEEZING: 0

## 2021-08-12 NOTE — PROGRESS NOTES
[facility]  [facility]  [address]  [facility] [address]  Dept: [phone]    2021    TELEHEALTH EVALUATION -- Audio/Visual (During BEKRT-24 public health emergency)  Shakir Costa (:  1965) has requested an audio/video evaluation for the following concern(s):    HPI:  Video visit to recheck chest congestion. Has started taking mucinex which has not improved his sx. Mostly congested in the morning, has to cough up phlem in the morning. Sx started 2 weeks ago. Taking allergy meds. No covid exposure. Has been vaccinated. Denies wheezing. Has not used an inhaler in the past.        There were no vitals filed for this visit. REVIEW OF SYSTEMS:   Review of Systems   Constitutional: Negative for fever. HENT: Positive for congestion. Respiratory: Positive for cough. Negative for wheezing.         PAST MEDICAL HISTORY:    Past Medical History:   Diagnosis Date    Allergic rhinitis     Change in bowel function     Chronic fatigue     Combined arterial insufficiency and corporo-venous occlusive erectile dysfunction     COVID-19 2021    Excessive daytime sleepiness     Hypothyroidism     Dr. Vivianne Spatz Mixed hyperlipidemia     Screen for colon cancer     Sleep apnea with use of continuous positive airway pressure (CPAP)     Snoring        FAMILY HISTORY:    Family History   Problem Relation Age of Onset    Heart Disease Mother     Pacemaker Mother     Thyroid Disease Mother     Cancer Father     Stroke Father     Lung Cancer Father     Brain Cancer Father     High Blood Pressure Sister     Thyroid Disease Brother     Thyroid Disease Brother        SOCIAL HISTORY:    Social History     Socioeconomic History    Marital status:      Spouse name: None    Number of children: None    Years of education: None    Highest education level: None   Occupational History    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, 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[place], [facility]   Tobacco Use    Smoking status: Never Smoker    Smokeless tobacco: Never Used   Vaping Use    Vaping Use: Never used   Substance and Sexual Activity    Alcohol use: Yes     Comment: 1 or 2 beers a week    Drug use: No    Sexual activity: Yes     Partners: Female   Other Topics Concern    None   Social History Narrative    None     Social Determinants of Health     Financial Resource Strain: Low Risk     Difficulty of Paying Living Expenses: Not hard at all   Food Insecurity: No Food Insecurity    Worried About Running Out of Food in the Last Year: Never true    Abdulkadir of Food in the Last Year: Never true   Transportation Needs: No Transportation Needs    Lack of Transportation (Medical): No    Lack of Transportation (Non-Medical):  No   Physical Activity:     Days of Exercise per Week:     Minutes of Exercise per Session:    Stress:     Feeling of Stress :    Social Connections:     Frequency of Communication with Friends and Family:     Frequency of Social Gatherings with Friends and Family:     Attends Quaker Services:     Active Member of Clubs or Organizations:     Attends Club or Organization Meetings:     Marital Status:    Intimate Partner Violence:     Fear of Current or Ex-Partner:     Emotionally Abused:     Physically Abused:     Sexually Abused:        SURGICAL HISTORY:    Past Surgical History:   Procedure Laterality Date    COLONOSCOPY N/A 4/19/2021    COLONOSCOPY POLYPECTOMY performed by Addie Gomez MD at [facility] 112:    Current Outpatient Medications   Medication Sig Dispense Refill    azelastine (ASTELIN) 0.1 % nasal spray 2 sprays by Nasal route 2 times daily 2 Spray in each nostril 4 Bottle 1    amoxicillin (AMOXIL) 875 MG tablet Take 1 tablet by mouth 2 times daily for 10 days 20 tablet 0    levothyroxine (SYNTHROID) 125 MCG tablet TAKE ONE TABLET BY MOUTH DAILY 30 tablet 4    montelukast (SINGULAIR) 10 MG tablet TAKE ONE TABLET BY MOUTH ONCE NIGHTLY 30 tablet 5    Cetirizine HCl (ZYRTEC ALLERGY) 10 MG CAPS        No current facility-administered medications for this visit. ALLERGIES:   Allergies   Allergen Reactions    Codeine     Requip [Ropinirole Hcl]        PHYSICAL EXAM:   Physical Exam  Vitals reviewed. Constitutional:       Appearance: Normal appearance. HENT:      Head: Normocephalic. Eyes:      Conjunctiva/sclera: Conjunctivae normal.   Pulmonary:      Effort: Pulmonary effort is normal.   Musculoskeletal:         General: Normal range of motion. Cervical back: Normal range of motion. Neurological:      General: No focal deficit present. Mental Status: He is alert and oriented to person, place, and time. Mental status is at baseline. Psychiatric:         Mood and Affect: Mood normal.         Behavior: Behavior normal.         Thought Content: Thought content normal.         Judgment: Judgment normal.          ASSESSMENT/PLAN:  1. Non-seasonal allergic rhinitis, unspecified trigger  Chronic, cont meds as prescribed. - azelastine (ASTELIN) 0.1 % nasal spray; 2 sprays by Nasal route 2 times daily 2 Spray in each nostril  Dispense: 4 Bottle; Refill: 1    2. Acute bronchitis, unspecified organism  Trial of abx due to length of sx. If worse with wheezing or tightness could add steroid or inhaler. Stay hydrated. - amoxicillin (AMOXIL) 875 MG tablet; Take 1 tablet by mouth 2 times daily for 10 days  Dispense: 20 tablet; Refill: 0      Return if symptoms worsen or fail to improve. Yoana Cooney is a 48 y.o. male being evaluated by a Virtual Visit (video visit) encounter to address concerns as mentioned above. A caregiver was present when appropriate.  Due to this being a TeleHealth encounter (During IBD-19 public health emergency), evaluation of the following organ systems was limited: Vitals/Constitutional/EENT/Resp/CV/GI//MS/Neuro/Skin/Heme-Lymph-Imm. Pursuant to the emergency declaration under the [address] [place] [address] and the [employer] and [employer] Act, this Virtual Visit was conducted with patient's (and/or legal guardian's) consent, to reduce the patient's risk of exposure to COVID-19 and provide necessary medical care. The patient (and/or legal guardian) has also been advised to contact this office for worsening conditions or problems, and seek emergency medical treatment and/or call 911 if deemed necessary. Services were provided through a video synchronous discussion virtually to substitute for in-person clinic visit. Patient and provider were located at their individual homes. --Darby Okeefe MD on 8/12/2021 at 10:35 AM    An electronic signature was used to authenticate this note.

## 2021-08-12 NOTE — TELEPHONE ENCOUNTER
----- Message from Natty DawkinsLinda sent at 8/12/2021  8:38 AM EDT -----  Subject: Message to Provider    QUESTIONS  Information for Provider? Patient chest congestion ha not improved was   advised to reach out if not changes. Please advise of next steps   ---------------------------------------------------------------------------  --------------  CALL BACK INFO  What is the best way for the office to contact you? OK to leave message on   voicemail, OK to respond with electronic message via [employer] portal (only   for patients who have registered [employer] account)  Preferred Call Back Phone Number? [phone]  ---------------------------------------------------------------------------  --------------  SCRIPT ANSWERS  Relationship to Patient?  Self

## 2021-08-16 ENCOUNTER — TELEPHONE (OUTPATIENT)
Dept: FAMILY MEDICINE CLINIC | Age: 56
End: 2021-08-16

## 2021-08-16 NOTE — TELEPHONE ENCOUNTER
----- Message from Sammi Walker sent at 8/16/2021  3:21 PM EDT -----  Subject: Referral Request    QUESTIONS   Reason for referral request? Patient was just on the phone with someone at   office and told him that Dr. Dede Worthy cannot do anything for him and will get   an ENT Referral (so he did not have to come in for 3:30 appt),   Additionally, wants to know if he can also get a referral to a Physical   Therapy referral for knees and ankles. Can someone please follow up. Thank   You. Has the physician seen you for this condition before? Yes  Select a date? 2021-05-14  Select the Provider the patient wants to be referred to, if known (PCP or   Specialist)? Barbara Vargas   Preferred Specialist (if applicable)? Do you already have an appointment scheduled? Yes  Select Scheduled Date? 2021-08-16  Select Scheduled Physician? Barbara Vargas   Additional Information for Provider? Did not have to come to appt today,   because office told him he did not need to, can just do the Referral over   the phone.   ---------------------------------------------------------------------------  --------------  [address]  What is the best way for the office to contact you? OK to leave message on   voicemail  Preferred Call Back Phone Number?  [phone]

## 2021-08-16 NOTE — TELEPHONE ENCOUNTER
----- Message from Jazmín Mercado sent at 8/16/2021  3:21 PM EDT -----  Subject: Referral Request    QUESTIONS   Reason for referral request? Patient was just on the phone with someone at   office and told him that Dr. Myriam Mendoza cannot do anything for him and will get   an ENT Referral (so he did not have to come in for 3:30 appt),   Additionally, wants to know if he can also get a referral to a Physical   Therapy referral for knees and ankles. Can someone please follow up. Thank   You. Has the physician seen you for this condition before? Yes  Select a date? 2021-05-14  Select the Provider the patient wants to be referred to, if known (PCP or   Specialist)? Jeevan Ronquillo   Preferred Specialist (if applicable)? Do you already have an appointment scheduled? Yes  Select Scheduled Date? 2021-08-16  Select Scheduled Physician? Jeevan Ronquillo   Additional Information for Provider? Did not have to come to appt today,   because office told him he did not need to, can just do the Referral over   the phone.   ---------------------------------------------------------------------------  --------------  [address]  What is the best way for the office to contact you? OK to leave message on   voicemail  Preferred Call Back Phone Number?  [phone]

## 2021-08-17 DIAGNOSIS — M22.2X2 PATELLOFEMORAL PAIN SYNDROME OF BOTH KNEES: Primary | ICD-10-CM

## 2021-08-17 DIAGNOSIS — M22.2X1 PATELLOFEMORAL PAIN SYNDROME OF BOTH KNEES: Primary | ICD-10-CM

## 2021-08-23 ENCOUNTER — TELEPHONE (OUTPATIENT)
Dept: FAMILY MEDICINE CLINIC | Age: 56
End: 2021-08-23

## 2021-08-23 DIAGNOSIS — M25.569 KNEE PAIN, UNSPECIFIED CHRONICITY, UNSPECIFIED LATERALITY: Primary | ICD-10-CM

## 2021-08-23 DIAGNOSIS — M25.579 ANKLE PAIN, UNSPECIFIED CHRONICITY, UNSPECIFIED LATERALITY: ICD-10-CM

## 2021-08-23 DIAGNOSIS — M25.561 ACUTE PAIN OF RIGHT KNEE: ICD-10-CM

## 2021-08-23 NOTE — TELEPHONE ENCOUNTER
----- Message from Yajairacatherine SejalLinda sent at 8/23/2021  9:39 AM EDT -----  Subject: Message to Provider    QUESTIONS  Information for Provider? Patient would like order for physical therapy   sent to, [address] [phone]   Please fax to [phone]   ---------------------------------------------------------------------------  --------------  CALL BACK INFO  What is the best way for the office to contact you? OK to leave message on   voicemail, OK to respond with electronic message via [employer] portal (only   for patients who have registered [employer] account)  Preferred Call Back Phone Number? [phone]  ---------------------------------------------------------------------------  --------------  SCRIPT ANSWERS  Relationship to Patient?  Self

## 2021-09-17 ENCOUNTER — OFFICE VISIT (OUTPATIENT)
Dept: PULMONOLOGY | Age: 56
End: 2021-09-17
Payer: OTHER GOVERNMENT

## 2021-09-17 VITALS
HEIGHT: 68 IN | RESPIRATION RATE: 16 BRPM | OXYGEN SATURATION: 98 % | TEMPERATURE: 98.2 F | SYSTOLIC BLOOD PRESSURE: 131 MMHG | DIASTOLIC BLOOD PRESSURE: 73 MMHG | BODY MASS INDEX: 25.01 KG/M2 | WEIGHT: 165 LBS | HEART RATE: 52 BPM

## 2021-09-17 DIAGNOSIS — J30.2 SEASONAL ALLERGIES: ICD-10-CM

## 2021-09-17 DIAGNOSIS — Z99.89 OSA ON CPAP: Primary | ICD-10-CM

## 2021-09-17 DIAGNOSIS — G47.33 OSA ON CPAP: Primary | ICD-10-CM

## 2021-09-17 DIAGNOSIS — G47.26 CIRCADIAN RHYTHM SLEEP DISORDER, SHIFT WORK TYPE: ICD-10-CM

## 2021-09-17 PROCEDURE — 99213 OFFICE O/P EST LOW 20 MIN: CPT | Performed by: INTERNAL MEDICINE

## 2021-09-17 ASSESSMENT — ENCOUNTER SYMPTOMS
EYES NEGATIVE: 1
RESPIRATORY NEGATIVE: 1

## 2021-09-17 NOTE — PROGRESS NOTES
Subjective:      Patient ID: Jazmin Armando is a 55 y.o. male being seen in my clinic for   Chief Complaint   Patient presents with    Sleep Apnea     follow up       HPI  Follow-up visit for sleep apnea complicated by circadian rhythm sleep disorder. Since his last visit a year ago he states that his symptoms of sleep apnea are well controlled on CPAP. Reports that he uses it every night for the entire night. Reports refreshing and restorative sleep. Denies excessive daytime sleepiness. Believes he is benefiting greatly. Compliance download not available. Patient has been having increasing sinus difficulty. Somewhat seasonal.  Seems to be worse in the morning after using CPAP. Currently seeing ENT. Treated with nasal saline irrigation and intranasal steroids. Apparently being evaluated for nasal septoplasty and turbinectomy. Patient received both of his Covid vaccinations. Review of Systems   Constitutional: Negative. HENT: Positive for postnasal drip. Eyes: Negative. Respiratory: Negative. Cardiovascular: Negative. All other systems reviewed and are negative. Objective:     Vitals:    09/17/21 1545   BP: 131/73   Site: Left Upper Arm   Position: Sitting   Cuff Size: Large Adult   Pulse: 52   Resp: 16   Temp: 98.2 °F (36.8 °C)   SpO2: 98%   Weight: 165 lb (74.8 kg)   Height: 5' 8\" (1.727 m)     Current Outpatient Medications   Medication Sig Dispense Refill    azelastine (ASTELIN) 0.1 % nasal spray 2 sprays by Nasal route 2 times daily 2 Spray in each nostril 4 Bottle 1    levothyroxine (SYNTHROID) 125 MCG tablet TAKE ONE TABLET BY MOUTH DAILY 30 tablet 4    montelukast (SINGULAIR) 10 MG tablet TAKE ONE TABLET BY MOUTH ONCE NIGHTLY 30 tablet 5    Cetirizine HCl (ZYRTEC ALLERGY) 10 MG CAPS        No current facility-administered medications for this visit. Physical Exam  Vitals and nursing note reviewed. Constitutional:       Appearance: He is well-developed.    HENT: Nose: Nose normal. No congestion. Mouth/Throat:      Mouth: Mucous membranes are moist.      Pharynx: Oropharynx is clear. No oropharyngeal exudate. Comments: Large tongue , low hanging soft palate and large uvula. Overall pharyngeal orifice moderately decreased    Eyes:      General: No scleral icterus. Conjunctiva/sclera: Conjunctivae normal.   Neck:      Thyroid: No thyromegaly. Vascular: No JVD. Trachea: No tracheal deviation. Cardiovascular:      Rate and Rhythm: Normal rate and regular rhythm. Heart sounds: Normal heart sounds. No murmur heard. No gallop. Pulmonary:      Effort: Pulmonary effort is normal. No respiratory distress. Breath sounds: No wheezing or rales. Chest:      Chest wall: No tenderness. Abdominal:      Palpations: Abdomen is soft. Tenderness: There is no abdominal tenderness. Musculoskeletal:      Cervical back: Neck supple. Lymphadenopathy:      Cervical: No cervical adenopathy. Skin:     General: Skin is warm and dry. Neurological:      Mental Status: He is alert and oriented to person, place, and time. Wt Readings from Last 3 Encounters:   09/17/21 165 lb (74.8 kg)   05/14/21 173 lb 6.4 oz (78.7 kg)   04/26/21 168 lb 9.6 oz (76.5 kg)     Results for orders placed or performed during the hospital encounter of 04/19/21   Surgical Pathology   Result Value Ref Range    Surgical Pathology Report       -- Diagnosis --    Hepatic flexure colon, polyp, biopsy:   Tubular adenoma.        SIMEON Amador  **Electronically Signed Out**         rdd/4/20/2021       Clinical Information  Pre-op Diagnosis:  LIQUID STOOL, CHANGE IN BOWEL FUNCTION, DIARRHEA   Operative Findings:  POLYP HEPATIC FLEXURE  Operation Performed:  COLONOSCOPY, POLYPECTOMY     Source of Specimen  1: POLYP HEPATIC FLEXURE    Gross Description  \"YUNI ARMIJO, POLYP HEPATIC FLEXURE\" Two tan-white tissue fragments  each 0.5 cm and are 1.0 x 0.5 x 0.3 cm in aggregate. Entirely 1cs. lm tm      Microscopic Description  Microscopic examination performed. SURGICAL PATHOLOGY CONSULTATION       Patient Name: Hannah Renae: [id number]  Path Number: [id number]    [facility] [address] [place], [address]  [phone]  Fax: [phone]         :      1. ADRIENNE on CPAP    2. Circadian rhythm sleep disorder, shift work type    3. Seasonal allergies      Patient Active Problem List   Diagnosis    Hypothyroidism    Regular astigmatism    Allergic rhinitis    Change in bowel function    Diarrhea    Liquid stool    COVID-19    Diverticulosis of colon    Tubular adenoma of colon         Plan:      1. Encourage CPAP use   2. Avoid sedative hypnotics and alcohol at bedtime  3. Exercise caution when driving and other high risk activities of not adequately treated for sleep apnea  4. Instructed to bring CPAP machine for use post op  5. No orders of the defined types were placed in this encounter. No orders of the defined types were placed in this encounter. Return in about 1 year (around 9/17/2022).        Electronically signed by Cornell Gonsalez DO on 9/17/2021at 7:36 PM

## 2021-11-08 RX ORDER — MONTELUKAST SODIUM 10 MG/1
TABLET ORAL
Qty: 30 TABLET | Refills: 5 | Status: SHIPPED | OUTPATIENT
Start: 2021-11-08 | End: 2022-08-22

## 2021-12-06 ENCOUNTER — OFFICE VISIT (OUTPATIENT)
Dept: FAMILY MEDICINE CLINIC | Age: 56
End: 2021-12-06
Payer: OTHER GOVERNMENT

## 2021-12-06 VITALS
TEMPERATURE: 97.2 F | DIASTOLIC BLOOD PRESSURE: 80 MMHG | HEART RATE: 78 BPM | OXYGEN SATURATION: 99 % | WEIGHT: 174.2 LBS | BODY MASS INDEX: 26.49 KG/M2 | SYSTOLIC BLOOD PRESSURE: 130 MMHG

## 2021-12-06 DIAGNOSIS — M79.671 PAIN IN BOTH FEET: Primary | ICD-10-CM

## 2021-12-06 DIAGNOSIS — M25.561 CHRONIC PAIN OF RIGHT KNEE: ICD-10-CM

## 2021-12-06 DIAGNOSIS — M25.512 CHRONIC LEFT SHOULDER PAIN: ICD-10-CM

## 2021-12-06 DIAGNOSIS — G89.29 CHRONIC PAIN OF RIGHT KNEE: ICD-10-CM

## 2021-12-06 DIAGNOSIS — M79.672 PAIN IN BOTH FEET: Primary | ICD-10-CM

## 2021-12-06 DIAGNOSIS — G89.29 CHRONIC LEFT SHOULDER PAIN: ICD-10-CM

## 2021-12-06 PROCEDURE — 99214 OFFICE O/P EST MOD 30 MIN: CPT | Performed by: FAMILY MEDICINE

## 2021-12-06 RX ORDER — MELOXICAM 7.5 MG/1
7.5 TABLET ORAL DAILY PRN
Qty: 30 TABLET | Refills: 5 | Status: SHIPPED | OUTPATIENT
Start: 2021-12-06 | End: 2022-05-31

## 2021-12-06 ASSESSMENT — ENCOUNTER SYMPTOMS
COUGH: 0
EYES NEGATIVE: 1
SHORTNESS OF BREATH: 0
ALLERGIC/IMMUNOLOGIC NEGATIVE: 1
ABDOMINAL PAIN: 0
BACK PAIN: 1

## 2021-12-06 NOTE — PROGRESS NOTES
[facility]  [facility]  [address]  [facility] [address]  Dept: [phone]    12/6/2021    CHIEF COMPLAINT    Chief Complaint   Patient presents with    Foot Pain    Neck Pain       HPI    Robbie Corral is a 55 y.o. male who presents   Chief Complaint   Patient presents with    Foot Pain    Neck Pain   . Recheck chronic joint pain. Has been to orthopedist for right knee pain. Pain has improved after he took a break from running. He is now starting to run on the treadmill shorter distances and at slower speeds. Has been having pain on the dorsum of both feet. Worse [place] the [place] wearing heavy shoes and carrying a 35 pound pack. Was trying to exercise with kettle bells and is having pain in his left shoulder with certain movements. Also pain in his low back with certain movements. Has not been taking any anti-inflammatories. Vitals:    12/06/21 1551   BP: 130/80   Pulse: 78   Temp: 97.2 °F (36.2 °C)   SpO2: 99%   Weight: 174 lb 3.2 oz (79 kg)       REVIEW OF SYSTEMS    Review of Systems   Constitutional: Negative for fatigue, fever and unexpected weight change. HENT: Negative. Eyes: Negative. Respiratory: Negative for cough and shortness of breath. Cardiovascular: Negative for chest pain and leg swelling. Gastrointestinal: Negative for abdominal pain. Endocrine: Negative. Genitourinary: Negative for frequency and urgency. Musculoskeletal: Positive for arthralgias (left shoulder, crepitus with lifting weights) and back pain. Negative for joint swelling. Rt knee pain has improved with shortening run speed and intensity. Pain in both feet. Low back twinges with certain movements   Skin: Negative. Allergic/Immunologic: Negative. Neurological: Negative for dizziness and headaches. Hematological: Negative. Psychiatric/Behavioral: Negative for sleep disturbance.  The patient is not nervous/anxious. PAST MEDICAL HISTORY    Past Medical History:   Diagnosis Date    Allergic rhinitis     Change in bowel function     Chronic fatigue     Combined arterial insufficiency and corporo-venous occlusive erectile dysfunction     COVID-19 02/2021    Excessive daytime sleepiness     Hypothyroidism     Dr. Luis F Liao Mixed hyperlipidemia     Screen for colon cancer     Sleep apnea with use of continuous positive airway pressure (CPAP)     Snoring        FAMILY HISTORY    Family History   Problem Relation Age of Onset    Heart Disease Mother     Pacemaker Mother     Thyroid Disease Mother     Cancer Father     Stroke Father     Lung Cancer Father     Brain Cancer Father     High Blood Pressure Sister     Thyroid Disease Brother     Thyroid Disease Brother        SOCIAL HISTORY    Social History     Socioeconomic History    Marital status:      Spouse name: None    Number of children: None    Years of education: None    Highest education level: None   Occupational History    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, 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[place], [facility]   Tobacco Use    Smoking status: Never Smoker    Smokeless tobacco: Never Used   Vaping Use    Vaping Use: Never used   Substance and Sexual Activity    Alcohol use: Yes     Comment: 1 or 2 beers a week    Drug use: No    Sexual activity: Yes     Partners: Female   Other Topics Concern    None   Social History Narrative    None     Social Determinants of Health     Financial Resource Strain: Low Risk     Difficulty of Paying Living Expenses: Not hard at all   Food Insecurity: No Food Insecurity    Worried About Running Out of Food in the Last Year: Never true    [address] in the Last Year: Never true   Transportation Needs: No Transportation Needs    Lack of Transportation (Medical): No    Lack of Transportation (Non-Medical):  No   Physical Activity:     Days of Exercise per Week: Not on file   [employer] of Exercise per Session: Not on file   Stress:     Feeling of Stress : Not on file   Social Connections:     Frequency of Communication with Friends and Family: Not on file    Frequency of Social Gatherings with Friends and Family: Not on file    Attends Protestant Services: Not on file    Active Member of [address] [place] [address] or Organizations: Not on file    Attends Club or Organization Meetings: Not on file    Marital Status: Not on file   Intimate Partner Violence:     Fear of Current or Ex-Partner: Not on file    Emotionally Abused: Not on file    Physically Abused: Not on file    Sexually Abused: Not on file   Housing Stability:     Unable to Pay for Housing in the Last Year: Not on file    Number of [place] in the Last Year: Not on file    Unstable Housing in the Last Year: Not on file       SURGICAL HISTORY    Past Surgical History:   Procedure Laterality Date    COLONOSCOPY N/A 4/19/2021    COLONOSCOPY POLYPECTOMY performed by Paulie Gregory MD at [place] [address]    Current Outpatient Medications   Medication Sig Dispense Refill    meloxicam (MOBIC) 7.5 MG tablet Take 1 tablet by mouth daily as needed for Pain 30 tablet 5    montelukast (SINGULAIR) 10 MG tablet TAKE ONE TABLET BY MOUTH ONCE NIGHTLY 30 tablet 5    levothyroxine (SYNTHROID) 125 MCG tablet TAKE ONE TABLET BY MOUTH DAILY 30 tablet 4    azelastine (ASTELIN) 0.1 % nasal spray 2 sprays by Nasal route 2 times daily 2 Spray in each nostril (Patient not taking: Reported on 12/6/2021) 4 Bottle 1    Cetirizine HCl (ZYRTEC ALLERGY) 10 MG CAPS  (Patient not taking: Reported on 12/6/2021)       No current facility-administered medications for this visit. ALLERGIES    Allergies   Allergen Reactions    Codeine     Requip [Ropinirole Hcl]        PHYSICAL EXAM   Physical Exam  Vitals reviewed. Constitutional:       Appearance: He is well-developed.    HENT:      Head: Normocephalic. Eyes:      Conjunctiva/sclera: Conjunctivae normal.      Pupils: Pupils are equal, round, and reactive to light. Neck:      Thyroid: No thyromegaly. Cardiovascular:      Rate and Rhythm: Normal rate and regular rhythm. Heart sounds: Normal heart sounds. No murmur heard. Pulmonary:      Effort: Pulmonary effort is normal.      Breath sounds: Normal breath sounds. No wheezing or rales. Abdominal:      Palpations: Abdomen is soft. Tenderness: There is no abdominal tenderness. There is no guarding or rebound. Musculoskeletal:         General: Tenderness (  Crepitus in the left shoulder) present. No deformity. Normal range of motion. Cervical back: Normal range of motion and neck supple. Comments: No tenderness to palpation of dorsal feet. Lymphadenopathy:      Cervical: No cervical adenopathy. Skin:     General: Skin is warm and dry. Neurological:      Mental Status: He is alert and oriented to person, place, and time. Psychiatric:         Mood and Affect: Mood normal.         Behavior: Behavior normal.         Thought Content: Thought content normal.         Judgment: Judgment normal.         ASSESSMENT/PLAN  1. Pain in both feet  Continue to modify activity as needed. Trial of Mobic 7.5 mg may increase to 50 mg if needed. Take with food  - meloxicam (MOBIC) 7.5 MG tablet; Take 1 tablet by mouth daily as needed for Pain  Dispense: 30 tablet; Refill: 5    2. Chronic left shoulder pain  Continue modified work out. Refer to physical therapy if symptoms persist or worsen. - meloxicam (MOBIC) 7.5 MG tablet; Take 1 tablet by mouth daily as needed for Pain  Dispense: 30 tablet; Refill: 5    3. Chronic pain of right knee  Continue modified work out and trial of Mobic  - meloxicam (MOBIC) 7.5 MG tablet; Take 1 tablet by mouth daily as needed for Pain  Dispense: 30 tablet;  Refill: 5       Xander received counseling on the following healthy behaviors: nutrition, exercise and medication adherence  Reviewed prior labs and health maintenance. Continue current medications, diet and exercise. Discussed use, benefit, and side effects of prescribed medications. Barriers to medication compliance addressed. Patient given educational materials - see patient instructions. All patient questions answered. Patient voiced understanding. Return if symptoms worsen or fail to improve.         Electronically signed by Shantelle Landa MD on 12/6/21 at 3:58 PM EST

## 2022-05-31 DIAGNOSIS — M79.671 PAIN IN BOTH FEET: ICD-10-CM

## 2022-05-31 DIAGNOSIS — M25.561 CHRONIC PAIN OF RIGHT KNEE: ICD-10-CM

## 2022-05-31 DIAGNOSIS — G89.29 CHRONIC PAIN OF RIGHT KNEE: ICD-10-CM

## 2022-05-31 DIAGNOSIS — M79.672 PAIN IN BOTH FEET: ICD-10-CM

## 2022-05-31 DIAGNOSIS — G89.29 CHRONIC LEFT SHOULDER PAIN: ICD-10-CM

## 2022-05-31 DIAGNOSIS — M25.512 CHRONIC LEFT SHOULDER PAIN: ICD-10-CM

## 2022-05-31 RX ORDER — MELOXICAM 7.5 MG/1
TABLET ORAL
Qty: 30 TABLET | Refills: 5 | Status: SHIPPED | OUTPATIENT
Start: 2022-05-31 | End: 2022-09-26 | Stop reason: SDUPTHER

## 2022-08-22 RX ORDER — MONTELUKAST SODIUM 10 MG/1
TABLET ORAL
Qty: 30 TABLET | Refills: 5 | Status: SHIPPED | OUTPATIENT
Start: 2022-08-22

## 2022-08-22 NOTE — TELEPHONE ENCOUNTER
[facility] is calling to request a refill on the following medication(s):    Medication Request:  Requested Prescriptions     Pending Prescriptions Disp Refills    montelukast (SINGULAIR) 10 MG tablet [Pharmacy Med Name: MONTELUKAST SOD 10 MG TABLET] 30 tablet 5     Sig: TAKE ONE TABLET BY MOUTH ONCE NIGHTLY       Last Visit Date (If Applicable):  35/7/9034    Next Visit Date:    9/26/2022

## 2022-09-23 ENCOUNTER — OFFICE VISIT (OUTPATIENT)
Dept: PULMONOLOGY | Age: 57
End: 2022-09-23
Payer: OTHER GOVERNMENT

## 2022-09-23 VITALS
HEART RATE: 54 BPM | BODY MASS INDEX: 27.13 KG/M2 | HEIGHT: 68 IN | DIASTOLIC BLOOD PRESSURE: 79 MMHG | SYSTOLIC BLOOD PRESSURE: 139 MMHG | OXYGEN SATURATION: 95 % | WEIGHT: 179 LBS

## 2022-09-23 DIAGNOSIS — G47.33 OSA ON CPAP: Primary | ICD-10-CM

## 2022-09-23 DIAGNOSIS — G47.26 CIRCADIAN RHYTHM SLEEP DISORDER, SHIFT WORK TYPE: ICD-10-CM

## 2022-09-23 DIAGNOSIS — J30.2 SEASONAL ALLERGIES: ICD-10-CM

## 2022-09-23 DIAGNOSIS — Z99.89 OSA ON CPAP: Primary | ICD-10-CM

## 2022-09-23 PROCEDURE — 99213 OFFICE O/P EST LOW 20 MIN: CPT | Performed by: INTERNAL MEDICINE

## 2022-09-23 RX ORDER — SODIUM CHLORIDE/ALOE VERA
GEL (GRAM) NASAL PRN
Qty: 1 EACH | Refills: 3 | Status: SHIPPED | OUTPATIENT
Start: 2022-09-23

## 2022-09-23 ASSESSMENT — SLEEP AND FATIGUE QUESTIONNAIRES
HOW LIKELY ARE YOU TO NOD OFF OR FALL ASLEEP WHILE SITTING INACTIVE IN A PUBLIC PLACE: 0
HOW LIKELY ARE YOU TO NOD OFF OR FALL ASLEEP IN A CAR, WHILE STOPPED FOR A FEW MINUTES IN TRAFFIC: 0
HOW LIKELY ARE YOU TO NOD OFF OR FALL ASLEEP WHILE SITTING AND READING: 0
HOW LIKELY ARE YOU TO NOD OFF OR FALL ASLEEP WHILE LYING DOWN TO REST IN THE AFTERNOON WHEN CIRCUMSTANCES PERMIT: 3
HOW LIKELY ARE YOU TO NOD OFF OR FALL ASLEEP WHEN YOU ARE A PASSENGER IN A CAR FOR AN HOUR WITHOUT A BREAK: 1
HOW LIKELY ARE YOU TO NOD OFF OR FALL ASLEEP WHILE WATCHING TV: 0
ESS TOTAL SCORE: 4
HOW LIKELY ARE YOU TO NOD OFF OR FALL ASLEEP WHILE SITTING AND TALKING TO SOMEONE: 0
HOW LIKELY ARE YOU TO NOD OFF OR FALL ASLEEP WHILE SITTING QUIETLY AFTER LUNCH WITHOUT ALCOHOL: 0

## 2022-09-23 ASSESSMENT — ENCOUNTER SYMPTOMS
EYES NEGATIVE: 1
RESPIRATORY NEGATIVE: 1

## 2022-09-23 NOTE — PROGRESS NOTES
Constitutional:       Appearance: He is well-developed. HENT:      Nose: Nose normal. No congestion. Eyes:      General: No scleral icterus. Conjunctiva/sclera: Conjunctivae normal.   Neck:      Thyroid: No thyromegaly. Vascular: No JVD. Trachea: No tracheal deviation. Cardiovascular:      Rate and Rhythm: Normal rate and regular rhythm. Heart sounds: Normal heart sounds. No murmur heard. No gallop. Pulmonary:      Effort: Pulmonary effort is normal. No respiratory distress. Breath sounds: No wheezing or rales. Chest:      Chest wall: No tenderness. Abdominal:      Palpations: Abdomen is soft. Tenderness: There is no abdominal tenderness. Musculoskeletal:      Cervical back: Neck supple. Lymphadenopathy:      Cervical: No cervical adenopathy. Skin:     General: Skin is warm and dry. Neurological:      Mental Status: He is alert and oriented to person, place, and time. Wt Readings from Last 3 Encounters:   09/23/22 179 lb (81.2 kg)   12/06/21 174 lb 3.2 oz (79 kg)   09/17/21 165 lb (74.8 kg)     Results for orders placed or performed during the hospital encounter of 04/19/21   Surgical Pathology   Result Value Ref Range    Surgical Pathology Report       -- Diagnosis --    Hepatic flexure colon, polyp, biopsy:   Tubular adenoma. SIMEON Coburn  **Electronically Signed Out**         rdd/4/20/2021       Clinical Information  Pre-op Diagnosis:  LIQUID STOOL, CHANGE IN BOWEL FUNCTION, DIARRHEA   Operative Findings:  POLYP HEPATIC FLEXURE  Operation Performed:  COLONOSCOPY, POLYPECTOMY     Source of Specimen  1: POLYP HEPATIC FLEXURE    Gross Description  \"YUNI ALEKSANDER, POLYP HEPATIC FLEXURE\" Two tan-white tissue fragments  each 0.5 cm and are 1.0 x 0.5 x 0.3 cm in aggregate. Entirely 1cs. lm tm      Microscopic Description  Microscopic examination performed.      SURGICAL PATHOLOGY CONSULTATION       Patient Name: Karolina Mcelroy: [id number]  Path Number: [id number]    [facility]  ANATOMIC PATHOLOGY  [address] [place] [address],  [address]. [facility] [place], [address]  [phone]  Fax: [phone]         :      1. ADRIENNE on CPAP    2. Circadian rhythm sleep disorder, shift work type    3. Seasonal allergies      Patient Active Problem List   Diagnosis    Hypothyroidism    Regular astigmatism    Allergic rhinitis    Change in bowel function    Diarrhea    Liquid stool    COVID-19    Diverticulosis of colon    Tubular adenoma of colon         Plan:      Encourage CPAP use   Avoid sedative hypnotics and alcohol at bedtime  Weight loss  New mask, tubing, and supplies. Exercise caution when driving and other high risk activities of not adequately treated for sleep apnea  Instructed to bring CPAP machine for use post op  Return in 1 year. Orders Placed This Encounter   Medications    saline nasal gel (AYR) GEL     Sig: by Nasal route as needed for Congestion     Dispense:  1 each     Refill:  3     Orders Placed This Encounter   Procedures    AK DURABLE MEDICAL EQUIPMENT MI     New CPAP mask tubing and supplies. Return in about 1 year (around 9/23/2023).        Electronically signed by Alban Hansen DO on 9/23/2022at 6:23 PM

## 2022-09-26 ENCOUNTER — OFFICE VISIT (OUTPATIENT)
Dept: FAMILY MEDICINE CLINIC | Age: 57
End: 2022-09-26
Payer: OTHER GOVERNMENT

## 2022-09-26 VITALS
BODY MASS INDEX: 26.91 KG/M2 | WEIGHT: 177 LBS | DIASTOLIC BLOOD PRESSURE: 62 MMHG | HEART RATE: 56 BPM | SYSTOLIC BLOOD PRESSURE: 110 MMHG

## 2022-09-26 DIAGNOSIS — G89.29 CHRONIC PAIN OF RIGHT KNEE: ICD-10-CM

## 2022-09-26 DIAGNOSIS — M25.561 CHRONIC PAIN OF RIGHT KNEE: ICD-10-CM

## 2022-09-26 DIAGNOSIS — Z13.220 SCREENING FOR LIPID DISORDERS: ICD-10-CM

## 2022-09-26 DIAGNOSIS — E03.9 ACQUIRED HYPOTHYROIDISM: Primary | ICD-10-CM

## 2022-09-26 DIAGNOSIS — M15.9 PRIMARY OSTEOARTHRITIS INVOLVING MULTIPLE JOINTS: ICD-10-CM

## 2022-09-26 DIAGNOSIS — M79.672 PAIN IN BOTH FEET: ICD-10-CM

## 2022-09-26 DIAGNOSIS — G89.29 CHRONIC LEFT SHOULDER PAIN: ICD-10-CM

## 2022-09-26 DIAGNOSIS — M79.671 PAIN IN BOTH FEET: ICD-10-CM

## 2022-09-26 DIAGNOSIS — M25.512 CHRONIC LEFT SHOULDER PAIN: ICD-10-CM

## 2022-09-26 LAB
T4 FREE: NORMAL
TSH SERPL DL<=0.05 MIU/L-ACNC: NORMAL M[IU]/L

## 2022-09-26 PROCEDURE — 99213 OFFICE O/P EST LOW 20 MIN: CPT | Performed by: FAMILY MEDICINE

## 2022-09-26 RX ORDER — MELOXICAM 15 MG/1
15 TABLET ORAL DAILY
Qty: 30 TABLET | Refills: 5 | Status: SHIPPED | OUTPATIENT
Start: 2022-09-26

## 2022-09-26 SDOH — ECONOMIC STABILITY: FOOD INSECURITY: WITHIN THE PAST 12 MONTHS, THE FOOD YOU BOUGHT JUST DIDN'T LAST AND YOU DIDN'T HAVE MONEY TO GET MORE.: NEVER TRUE

## 2022-09-26 SDOH — ECONOMIC STABILITY: FOOD INSECURITY: WITHIN THE PAST 12 MONTHS, YOU WORRIED THAT YOUR FOOD WOULD RUN OUT BEFORE YOU GOT MONEY TO BUY MORE.: NEVER TRUE

## 2022-09-26 ASSESSMENT — ENCOUNTER SYMPTOMS
ALLERGIC/IMMUNOLOGIC NEGATIVE: 1
EYES NEGATIVE: 1
DIARRHEA: 0
SHORTNESS OF BREATH: 0
COUGH: 0
ABDOMINAL PAIN: 0
BLOOD IN STOOL: 0
CONSTIPATION: 0

## 2022-09-26 ASSESSMENT — SOCIAL DETERMINANTS OF HEALTH (SDOH): HOW HARD IS IT FOR YOU TO PAY FOR THE VERY BASICS LIKE FOOD, HOUSING, MEDICAL CARE, AND HEATING?: NOT HARD AT ALL

## 2022-09-26 ASSESSMENT — PATIENT HEALTH QUESTIONNAIRE - PHQ9
SUM OF ALL RESPONSES TO PHQ QUESTIONS 1-9: 0
SUM OF ALL RESPONSES TO PHQ9 QUESTIONS 1 & 2: 0
SUM OF ALL RESPONSES TO PHQ QUESTIONS 1-9: 0
2. FEELING DOWN, DEPRESSED OR HOPELESS: 0
1. LITTLE INTEREST OR PLEASURE IN DOING THINGS: 0
SUM OF ALL RESPONSES TO PHQ QUESTIONS 1-9: 0
SUM OF ALL RESPONSES TO PHQ QUESTIONS 1-9: 0

## 2022-09-26 NOTE — PROGRESS NOTES
[address] [place] [address] [facility] [address]  Dept: [phone]    9/26/2022    CHIEF COMPLAINT    Chief Complaint   Patient presents with    Hypothyroidism       HPI    Malika Hamilton is a 65 y.o. male who presents   Chief Complaint   Patient presents with    Hypothyroidism   . Recheck chronic arthritis pain in feet, worse when working out more frequently. Feels a shock in the balls of his feet at times. Would like to increase mobic dose. Has appt with new endocrinologist and would like to have labs done prior to visit. Vitals:    09/26/22 1025   BP: 110/62   Pulse: 56   Weight: 177 lb (80.3 kg)       REVIEW OF SYSTEMS    Review of Systems   Constitutional:  Negative for fatigue, fever and unexpected weight change. HENT: Negative. Eyes: Negative. Respiratory:  Negative for cough and shortness of breath. Cardiovascular:  Negative for chest pain and leg swelling. Gastrointestinal:  Negative for abdominal pain, blood in stool, constipation and diarrhea. Endocrine: Negative. Genitourinary:  Negative for frequency and urgency. Musculoskeletal:  Positive for arthralgias. Skin: Negative. Allergic/Immunologic: Negative. Neurological:  Negative for dizziness and headaches. Hematological: Negative. Psychiatric/Behavioral:  Negative for sleep disturbance. The patient is not nervous/anxious.       PAST MEDICAL HISTORY    Past Medical History:   Diagnosis Date    Allergic rhinitis     Change in bowel function     Chronic fatigue     Combined arterial insufficiency and corporo-venous occlusive erectile dysfunction     COVID-19 02/2021    Excessive daytime sleepiness     Hypothyroidism     Dr. Escobar [place]    Mixed hyperlipidemia     Screen for colon cancer     Sleep apnea with use of continuous positive airway pressure (CPAP)     Snoring        FAMILY HISTORY    Family History   Problem Relation Age of Onset    Heart Disease Mother     Pacemaker Mother     Thyroid Disease Mother     Cancer Father     Stroke Father     Lung Cancer Father     Brain Cancer Father     High Blood Pressure Sister     Thyroid Disease Brother     Thyroid Disease Brother        SOCIAL HISTORY    Social History     Socioeconomic History    Marital status:      Spouse name: None    Number of children: None    Years of education: None    Highest education level: None   Occupational History    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, 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[employer] [employer]   Tobacco Use    Smoking status: Never    Smokeless tobacco: Never   Vaping Use    Vaping Use: Never used   Substance and Sexual Activity    Alcohol use: Yes     Comment: 1 or 2 beers a week    Drug use: No    Sexual activity: Yes     Partners: Female     Social Determinants of Health     Financial Resource Strain: Low Risk     Difficulty of Paying Living Expenses: Not hard at all   Food Insecurity: No Food Insecurity    Worried About [address] [facility] in the Last Year: Never true    [address] [facility] [address] in the Last Year: Never true       SURGICAL HISTORY    Past Surgical History:   Procedure Laterality Date    COLONOSCOPY N/A 4/19/2021    COLONOSCOPY POLYPECTOMY performed by Darron Conner MD at [address] [place] [address]    Current Outpatient Medications   Medication Sig Dispense Refill    meloxicam (MOBIC) 15 MG tablet Take 1 tablet by mouth daily Take with food 30 tablet 5    saline nasal gel (AYR) GEL by Nasal route as needed for Congestion 1 each 3    montelukast (SINGULAIR) 10 MG tablet TAKE ONE TABLET BY MOUTH ONCE NIGHTLY 30 tablet 5    azelastine (ASTELIN) 0.1 % nasal spray 2 sprays by Nasal route 2 times daily 2 Spray in each nostril 4 Bottle 1    levothyroxine (SYNTHROID) 125 MCG tablet TAKE ONE TABLET BY MOUTH DAILY 30 tablet 4    Cetirizine HCl (ZYRTEC ALLERGY) 10 MG CAPS        No current facility-administered medications for this visit. ALLERGIES    Allergies   Allergen Reactions    Codeine     Requip [Ropinirole Hcl]        PHYSICAL EXAM   Physical Exam  Vitals reviewed. Constitutional:       Appearance: He is well-developed. HENT:      Head: Normocephalic. Eyes:      Pupils: Pupils are equal, round, and reactive to light. Neck:      Thyroid: No thyromegaly. Cardiovascular:      Rate and Rhythm: Normal rate and regular rhythm. Heart sounds: Normal heart sounds. No murmur heard. Pulmonary:      Effort: Pulmonary effort is normal.      Breath sounds: Normal breath sounds. No wheezing or rales. Abdominal:      Palpations: Abdomen is soft. Musculoskeletal:         General: No tenderness or deformity. Normal range of motion. Cervical back: Normal range of motion and neck supple. Lymphadenopathy:      Cervical: No cervical adenopathy. Skin:     General: Skin is warm and dry. Neurological:      Mental Status: He is alert and oriented to person, place, and time. Psychiatric:         Mood and Affect: Mood normal.         Behavior: Behavior normal.         Thought Content: Thought content normal.         Judgment: Judgment normal.       ASSESSMENT/PLAN  1. Acquired hypothyroidism  Recheck levels and see endocrinologist as planned  - T4, Free; Future  - TSH; Future    2. Primary osteoarthritis involving multiple joints  Continue activity within limitations  - Comprehensive Metabolic Panel; Future    3. Screening for lipid disorders    - Lipid Panel; Future    4. Pain in both feet  Increase Mobic as requested, reminded to take with food  - meloxicam (MOBIC) 15 MG tablet; Take 1 tablet by mouth daily Take with food  Dispense: 30 tablet; Refill: 5    5. Chronic left shoulder pain    - meloxicam (MOBIC) 15 MG tablet; Take 1 tablet by mouth daily Take with food  Dispense: 30 tablet; Refill: 5    6. Chronic pain of right knee    - meloxicam (MOBIC) 15 MG tablet;  Take 1 tablet by mouth daily Take with food  Dispense: 30 tablet; Refill: 5     Xander received counseling on the following healthy behaviors: nutrition, exercise, and medication adherence  Reviewed prior labs and health maintenance. Continue current medications, diet and exercise. Discussed use, benefit, and side effects of prescribed medications. Barriers to medication compliance addressed. Patient given educational materials - see patient instructions. All patient questions answered. Patient voiced understanding. Return if symptoms worsen or fail to improve.         Electronically signed by Weston Painting MD on 9/26/22 at 10:35 AM EDT

## 2022-09-28 RX ORDER — LEVOTHYROXINE SODIUM 0.12 MG/1
125 TABLET ORAL DAILY
Qty: 30 TABLET | Refills: 5 | Status: SHIPPED | OUTPATIENT
Start: 2022-09-28

## 2022-09-28 NOTE — TELEPHONE ENCOUNTER
Pt stated his Endo provider no longer in, was notified to r/s appt for 9.28.22. Next appt not until: Unknown. Was instructed to have medication filled with pcp, until further notice. Has about 2 days left for his medication. Last seen on 9.26.2022, had labs done same day at [facility].

## 2022-09-30 DIAGNOSIS — E03.9 ACQUIRED HYPOTHYROIDISM: ICD-10-CM

## 2022-10-03 DIAGNOSIS — M15.9 PRIMARY OSTEOARTHRITIS INVOLVING MULTIPLE JOINTS: ICD-10-CM

## 2022-10-03 DIAGNOSIS — Z13.220 SCREENING FOR LIPID DISORDERS: ICD-10-CM

## 2022-10-03 LAB
ALBUMIN SERPL-MCNC: NORMAL G/DL
ALP BLD-CCNC: NORMAL U/L
ALT SERPL-CCNC: NORMAL U/L
ANION GAP SERPL CALCULATED.3IONS-SCNC: NORMAL MMOL/L
AST SERPL-CCNC: NORMAL U/L
BILIRUB SERPL-MCNC: NORMAL MG/DL
BUN BLDV-MCNC: NORMAL MG/DL
CALCIUM SERPL-MCNC: NORMAL MG/DL
CHLORIDE BLD-SCNC: NORMAL MMOL/L
CHOLESTEROL, TOTAL: NORMAL
CHOLESTEROL/HDL RATIO: NORMAL
CO2: NORMAL
CREAT SERPL-MCNC: NORMAL MG/DL
GFR CALCULATED: NORMAL
GLUCOSE BLD-MCNC: NORMAL MG/DL
HDLC SERPL-MCNC: NORMAL MG/DL
LDL CHOLESTEROL CALCULATED: NORMAL
NONHDLC SERPL-MCNC: NORMAL MG/DL
POTASSIUM SERPL-SCNC: NORMAL MMOL/L
SODIUM BLD-SCNC: NORMAL MMOL/L
TOTAL PROTEIN: NORMAL
TRIGL SERPL-MCNC: NORMAL MG/DL
VLDLC SERPL CALC-MCNC: NORMAL MG/DL

## 2022-10-12 DIAGNOSIS — J30.89 NON-SEASONAL ALLERGIC RHINITIS, UNSPECIFIED TRIGGER: ICD-10-CM

## 2022-10-13 NOTE — TELEPHONE ENCOUNTER
[facility] is calling to request a refill on the following medication(s):    Medication Request:  Requested Prescriptions     Pending Prescriptions Disp Refills    Azelastine HCl 137 MCG/SPRAY SOLN [Pharmacy Med Name: AZELASTINE 0.1% (137 MCG) SPRY] 30 mL      Sig: SPRAY TWO SPRAYS IN EACH NOSTRIL TWICE DAILY       Last Visit Date (If Applicable):  0/90/6608    Next Visit Date:    Visit date not found

## 2022-10-14 RX ORDER — AZELASTINE HYDROCHLORIDE 137 UG/1
SPRAY, METERED NASAL
Qty: 30 ML | Refills: 2 | Status: SHIPPED | OUTPATIENT
Start: 2022-10-14

## 2022-11-10 ENCOUNTER — TELEMEDICINE (OUTPATIENT)
Dept: FAMILY MEDICINE CLINIC | Age: 57
End: 2022-11-10
Payer: OTHER GOVERNMENT

## 2022-11-10 DIAGNOSIS — M25.569 KNEE PAIN, UNSPECIFIED CHRONICITY, UNSPECIFIED LATERALITY: ICD-10-CM

## 2022-11-10 DIAGNOSIS — R05.8 COUGH PRESENT FOR GREATER THAN 3 WEEKS: Primary | ICD-10-CM

## 2022-11-10 PROCEDURE — 99214 OFFICE O/P EST MOD 30 MIN: CPT | Performed by: FAMILY MEDICINE

## 2022-11-10 RX ORDER — BUDESONIDE, GLYCOPYRROLATE, AND FORMOTEROL FUMARATE 160; 9; 4.8 UG/1; UG/1; UG/1
2 AEROSOL, METERED RESPIRATORY (INHALATION) 2 TIMES DAILY
Qty: 1 EACH | Refills: 5 | Status: SHIPPED | OUTPATIENT
Start: 2022-11-10

## 2022-11-10 RX ORDER — PREDNISONE 20 MG/1
TABLET ORAL
Qty: 20 TABLET | Refills: 0 | Status: SHIPPED | OUTPATIENT
Start: 2022-11-10

## 2022-11-10 RX ORDER — MOXIFLOXACIN HYDROCHLORIDE 400 MG/1
400 TABLET ORAL DAILY
Qty: 10 TABLET | Refills: 0 | Status: SHIPPED | OUTPATIENT
Start: 2022-11-10 | End: 2022-11-20

## 2022-11-10 ASSESSMENT — PATIENT HEALTH QUESTIONNAIRE - PHQ9
SUM OF ALL RESPONSES TO PHQ9 QUESTIONS 1 & 2: 0
SUM OF ALL RESPONSES TO PHQ QUESTIONS 1-9: 0
SUM OF ALL RESPONSES TO PHQ QUESTIONS 1-9: 0
2. FEELING DOWN, DEPRESSED OR HOPELESS: 0
SUM OF ALL RESPONSES TO PHQ QUESTIONS 1-9: 0
SUM OF ALL RESPONSES TO PHQ QUESTIONS 1-9: 0
1. LITTLE INTEREST OR PLEASURE IN DOING THINGS: 0

## 2022-11-10 NOTE — PROGRESS NOTES
11/10/2022    TELEHEALTH EVALUATION -- Audio/Visual (During GBJAY-24 public health emergency)    HPI:    Svitlana Ba (:  1965) has requested an audio/video evaluation for the following concern(s):    He is being seen today stating for a couple months now he has been having an ongoing cough and congestion worse at night he has been treated for allergies but he just is not resolving and does feel he can hear congestion in his chest  He does not necessarily feel sick so much that it does not have a fever he did have COVID a couple years ago but this developed after that  He also states that he had to carry 50 pounds a year a couple weeks ago and after that his ankles and knees and everything hurt but now the knees are still persisting on hurting    Review of Systems    Prior to Visit Medications    Medication Sig Taking?  Authorizing Provider   moxifloxacin (AVELOX) 400 MG tablet Take 1 tablet by mouth daily for 10 days Yes Zuly Castaneda DO   Budeson-Glycopyrrol-Formoterol (BREZTRI AEROSPHERE) 160-9-4.8 MCG/ACT AERO Inhale 2 puffs into the lungs 2 times daily Yes Zuly Castaneda DO   predniSONE (DELTASONE) 20 MG tablet 1 p.o. Q.i.d. x2 days, 1 p.o. T.i.d. x2 days, 1 p.o. B.i.d. x2 days, 1 p.o. Q. Day x2 days dispense quantity suff Yes Zuly Castaneda DO   Azelastine HCl 137 MCG/SPRAY SOLN SPRAY TWO SPRAYS IN EACH NOSTRIL TWICE DAILY  Keith Mendez MD   levothyroxine (SYNTHROID) 125 MCG tablet Take 1 tablet by mouth Daily  Keith Mendez MD   meloxicam (MOBIC) 15 MG tablet Take 1 tablet by mouth daily Take with food  Keith Mendez MD   saline nasal gel (AYR) GEL by Nasal route as needed for Congestion  Javier Villegas, DO   montelukast (SINGULAIR) 10 MG tablet TAKE ONE TABLET BY MOUTH ONCE NIGHTLY  Keith Mendez MD   Cetirizine HCl (ZYRTEC ALLERGY) 10 MG CAPS   Historical Provider, MD       Social History     Tobacco Use    Smoking status: Never    Smokeless tobacco: Never   Vaping Use    Vaping Use: Never used   Substance Use Topics    Alcohol use: Yes     Comment: 1 or 2 beers a week    Drug use: No        Allergies   Allergen Reactions    Codeine     Requip [Ropinirole Hcl]    ,   Past Medical History:   Diagnosis Date    Allergic rhinitis     Change in bowel function     Chronic fatigue     Combined arterial insufficiency and corporo-venous occlusive erectile dysfunction     COVID-19 02/2021    Excessive daytime sleepiness     Hypothyroidism     Dr. Chrissy Redd    Mixed hyperlipidemia     Screen for colon cancer     Sleep apnea with use of continuous positive airway pressure (CPAP)     Snoring    ,   Past Surgical History:   Procedure Laterality Date    COLONOSCOPY N/A 4/19/2021    COLONOSCOPY POLYPECTOMY performed by Mckay Camarena MD at [address] [place] [address]    ,   Social History     Tobacco Use    Smoking status: Never    Smokeless tobacco: Never   Vaping Use    Vaping Use: Never used   Substance Use Topics    Alcohol use: Yes     Comment: 1 or 2 beers a week    Drug use: No   ,   Family History   Problem Relation Age of Onset    Heart Disease Mother     Pacemaker Mother     Thyroid Disease Mother     Cancer Father     Stroke Father     Lung Cancer Father     Brain Cancer Father     High Blood Pressure Sister     Thyroid Disease Brother     Thyroid Disease Brother    ,   Immunization History   Administered Date(s) Administered    COVID-19, MODERNA BLUE border, Primary or Immunocompromised, (age 12y+), IM, 100 mcg/0.5mL 02/10/2021, 03/06/2021    Zoster Recombinant (Shingrix) 02/07/2019, 05/23/2019       PHYSICAL EXAMINATION:  [ INSTRUCTIONS:  \"[x]\" Indicates a positive item  \"[]\" Indicates a negative item  -- DELETE ALL ITEMS NOT EXAMINED]  Vital Signs: (As obtained by patient/caregiver or practitioner observation)    Blood pressure-  Heart rate-    Respiratory rate-    Temperature-  Pulse oximetry-     Constitutional: [x] Appears well-developed and well-nourished [x] No apparent distress      [] Abnormal-   Mental status  [x] Alert and awake  [x] Oriented to person/place/time [x]Able to follow commands      Eyes:  EOM    [x]  Normal  [] Abnormal-  Sclera  [x]  Normal  [] Abnormal -         Discharge [x]  None visible  [] Abnormal -    HENT:   [x] Normocephalic, atraumatic. [] Abnormal   [x] Mouth/Throat: Mucous membranes are moist.     External Ears [x] Normal  [] Abnormal-     Neck: [x] No visualized mass     Pulmonary/Chest: [x] Respiratory effort normal.  [x] No visualized signs of difficulty breathing or respiratory distress        [] Abnormal-      Musculoskeletal:   [x] Normal gait with no signs of ataxia         [x] Normal range of motion of neck        [] Abnormal-       Neurological:        [x] No Facial Asymmetry (Cranial nerve 7 motor function) (limited exam to video visit)          [x] No gaze palsy        [] Abnormal-         Skin:        [x] No significant exanthematous lesions or discoloration noted on facial skin         [] Abnormal-            Psychiatric:       [x] Normal Affect [x] No Hallucinations        [] Abnormal-     Other pertinent observable physical exam findings-     ASSESSMENT/PLAN:   Diagnosis Orders   1. Cough present for greater than 3 weeks  moxifloxacin (AVELOX) 400 MG tablet    Budeson-Glycopyrrol-Formoterol (BREZTRI AEROSPHERE) 160-9-4.8 MCG/ACT AERO    predniSONE (DELTASONE) 20 MG tablet      2. Knee pain, unspecified chronicity, unspecified laterality        No orders of the defined types were placed in this encounter.     Requested Prescriptions     Signed Prescriptions Disp Refills    moxifloxacin (AVELOX) 400 MG tablet 10 tablet 0     Sig: Take 1 tablet by mouth daily for 10 days    Budeson-Glycopyrrol-Formoterol (BREZTRI AEROSPHERE) 160-9-4.8 MCG/ACT AERO 1 each 5     Sig: Inhale 2 puffs into the lungs 2 times daily    predniSONE (DELTASONE) 20 MG tablet 20 tablet 0     Si p.o. Q.i.d. x2 days, 1 p.o. T.i.d. x2 days, 1 p.o. B.i.d. x2 days, 1 p.o. Q. Day x2 days dispense quantity suff     I recommended we do a course of antibiotics throughout that he has some lingering mycoplasma and also started on an inhaler for any pneumonitis and steroids with the steroids will also help the knees he also needs to ice and elevate at night and follow-up in office if any other problems persist    No follow-ups on file. Jennifer Mcmillan is a 65 y.o. male being evaluated by a Virtual Visit (video visit) encounter to address concerns as mentioned above. A caregiver was present when appropriate. Due to this being a TeleHealth encounter (During DRYQC-07 public health emergency), evaluation of the following organ systems was limited: Vitals/Constitutional/EENT/Resp/CV/GI//MS/Neuro/Skin/Heme-Lymph-Imm. Pursuant to the emergency declaration under the [address] [place] [address], [address] [place] [address] authority and the [employer] Act, this Virtual Visit was conducted with patient's (and/or legal guardian's) consent, to reduce the patient's risk of exposure to COVID-19 and provide necessary medical care. The patient (and/or legal guardian) has also been advised to contact this office for worsening conditions or problems, and seek emergency medical treatment and/or call 911 if deemed necessary. Patient identification was verified at the start of the visit: Yes    Total time spent on this encounter: Not billed by time    Services were provided through a video synchronous discussion virtually to substitute for in-person clinic visit. Patient and provider were located at their individual homes. --Lexi Kirby DO on 11/10/2022 at 11:15 AM    An electronic signature was used to authenticate this note.

## 2022-12-13 ENCOUNTER — OFFICE VISIT (OUTPATIENT)
Dept: FAMILY MEDICINE CLINIC | Age: 57
End: 2022-12-13
Payer: OTHER GOVERNMENT

## 2022-12-13 VITALS
DIASTOLIC BLOOD PRESSURE: 70 MMHG | BODY MASS INDEX: 27.1 KG/M2 | SYSTOLIC BLOOD PRESSURE: 128 MMHG | WEIGHT: 178.2 LBS | HEART RATE: 62 BPM

## 2022-12-13 DIAGNOSIS — R20.2 TINGLING OF BOTH FEET: ICD-10-CM

## 2022-12-13 DIAGNOSIS — R25.2 MUSCLE CRAMPS: Primary | ICD-10-CM

## 2022-12-13 PROCEDURE — 99213 OFFICE O/P EST LOW 20 MIN: CPT | Performed by: FAMILY MEDICINE

## 2022-12-13 RX ORDER — MAGNESIUM OXIDE/MAG AA CHELATE 300 MG
1 CAPSULE ORAL 2 TIMES DAILY
Qty: 60 CAPSULE | Refills: 5
Start: 2022-12-13

## 2022-12-13 ASSESSMENT — ENCOUNTER SYMPTOMS
ALLERGIC/IMMUNOLOGIC NEGATIVE: 1
BLOOD IN STOOL: 0
DIARRHEA: 0
CONSTIPATION: 0
SHORTNESS OF BREATH: 0
ABDOMINAL PAIN: 0
COUGH: 0
EYES NEGATIVE: 1

## 2022-12-13 NOTE — PROGRESS NOTES
[address] [place] [address] [address]  Dept: [phone]    12/13/2022    CHIEF COMPLAINT    Chief Complaint   Patient presents with    Tingling     Feet        HPI    Meredith Lynn is a 65 y.o. male who presents   Chief Complaint   Patient presents with    Tingling     Feet    . Having pain in rt foot with cramping of toes, he has to stop and put weight on his foot to relieve it. Also gets a tingling in his feet intermittently. Has gained some weight since he is not as active with running. Was having knee pain, went to sports med provider      Vitals:    12/13/22 1329   BP: 128/70   Pulse: 62   Weight: 178 lb 3.2 oz (80.8 kg)       REVIEW OF SYSTEMS    Review of Systems   Constitutional:  Positive for unexpected weight change. Negative for fatigue and fever. HENT: Negative. Eyes: Negative. Respiratory:  Negative for cough and shortness of breath. Cardiovascular:  Negative for chest pain and leg swelling. Gastrointestinal:  Negative for abdominal pain, blood in stool, constipation and diarrhea. Endocrine: Negative. Genitourinary:  Negative for frequency and urgency. Musculoskeletal:  Positive for arthralgias (feet and knees). Skin: Negative. Allergic/Immunologic: Negative. Neurological:  Negative for dizziness and headaches. Hematological: Negative. Psychiatric/Behavioral:  Negative for sleep disturbance. The patient is not nervous/anxious.       PAST MEDICAL HISTORY    Past Medical History:   Diagnosis Date    Allergic rhinitis     Change in bowel function     Chronic fatigue     Combined arterial insufficiency and corporo-venous occlusive erectile dysfunction     COVID-19 02/2021    Excessive daytime sleepiness     Hypothyroidism     Dr. Yariel Kumar    Mixed hyperlipidemia     Screen for colon cancer     Sleep apnea with use of continuous positive airway pressure (CPAP)     Snoring        FAMILY HISTORY    Family History   Problem Relation Age of Onset    Heart Disease Mother     Pacemaker Mother     Thyroid Disease Mother     Cancer Father     Stroke Father     Lung Cancer Father     Brain Cancer Father     High Blood Pressure Sister     Thyroid Disease Brother     Thyroid Disease Brother        SOCIAL HISTORY    Social History     Socioeconomic History    Marital status:      Spouse name: None    Number of children: None    Years of education: None    Highest education level: None   Occupational History    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, 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[place], [place]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[place], [facility]   Tobacco Use    Smoking status: Never    Smokeless tobacco: Never   Vaping Use    Vaping Use: Never used   Substance and Sexual Activity    Alcohol use: Yes     Comment: 1 or 2 beers a week    Drug use: No    Sexual activity: Yes     Partners: Female     Social Determinants of Health     Financial Resource Strain: Low Risk     Difficulty of Paying Living Expenses: Not hard at all   Food Insecurity: No Food Insecurity    Worried About [address] in the Last Year: Never true    [facility] in the Last Year: Never true       SURGICAL HISTORY    Past Surgical History:   Procedure Laterality Date    COLONOSCOPY N/A 4/19/2021    COLONOSCOPY POLYPECTOMY performed by Heriberto Larose MD at [address] [place] [address] Dr    Current Outpatient Medications   Medication Sig Dispense Refill    Magnesium 300 MG CAPS Take 1 capsule by mouth 2 times daily 60 capsule 5    Budeson-Glycopyrrol-Formoterol (BREZTRI AEROSPHERE) 160-9-4.8 MCG/ACT AERO Inhale 2 puffs into the lungs 2 times daily 1 each 5    Azelastine HCl 137 MCG/SPRAY SOLN SPRAY TWO SPRAYS IN EACH NOSTRIL TWICE DAILY 30 mL 2    levothyroxine (SYNTHROID) 125 MCG tablet Take 1 tablet by mouth Daily 30 tablet 5    meloxicam (MOBIC) 15 MG tablet Take 1 tablet by mouth daily Take with food 30 tablet 5    saline nasal gel (AYR) GEL by Nasal route as needed for Congestion 1 each 3    montelukast (SINGULAIR) 10 MG tablet TAKE ONE TABLET BY MOUTH ONCE NIGHTLY 30 tablet 5    Cetirizine HCl (ZYRTEC ALLERGY) 10 MG CAPS        No current facility-administered medications for this visit. ALLERGIES    Allergies   Allergen Reactions    Codeine     Requip [Ropinirole Hcl]        PHYSICAL EXAM   Physical Exam  Vitals reviewed. Constitutional:       Appearance: He is well-developed. HENT:      Head: Normocephalic. Eyes:      Pupils: Pupils are equal, round, and reactive to light. Neck:      Thyroid: No thyromegaly. Cardiovascular:      Rate and Rhythm: Normal rate and regular rhythm. Heart sounds: Normal heart sounds. No murmur heard. Pulmonary:      Effort: Pulmonary effort is normal.      Breath sounds: Normal breath sounds. No wheezing or rales. Abdominal:      Palpations: Abdomen is soft. Tenderness: There is no abdominal tenderness. There is no guarding or rebound. Musculoskeletal:         General: No tenderness or deformity. Normal range of motion. Cervical back: Normal range of motion and neck supple. Lymphadenopathy:      Cervical: No cervical adenopathy. Skin:     General: Skin is warm and dry. Neurological:      Mental Status: He is alert and oriented to person, place, and time. Psychiatric:         Mood and Affect: Mood normal.         Behavior: Behavior normal.         Thought Content: Thought content normal.         Judgment: Judgment normal.       ASSESSMENT/PLAN  1. Muscle cramps  Increase hydration. Try magnesium supplement. Regular stretching.   - Magnesium 300 MG CAPS; Take 1 capsule by mouth 2 times daily  Dispense: 60 capsule; Refill: 5    2. Tingling of both feet  If sx persist refer to podiatrist.  Use shoe insert.      Tonya Andrews received counseling on the following healthy behaviors: nutrition, exercise, and medication adherence  Reviewed prior labs and health maintenance. Continue current medications, diet and exercise. Discussed use, benefit, and side effects of prescribed medications. Barriers to medication compliance addressed. Patient given educational materials - see patient instructions. All patient questions answered. Patient voiced understanding. Return if symptoms worsen or fail to improve.         Electronically signed by Evelio Haq MD on 12/13/22 at 1:36 PM EST

## 2023-02-06 RX ORDER — MONTELUKAST SODIUM 10 MG/1
TABLET ORAL
Qty: 90 TABLET | Refills: 0 | Status: SHIPPED | OUTPATIENT
Start: 2023-02-06

## 2023-04-04 RX ORDER — LEVOTHYROXINE SODIUM 0.12 MG/1
TABLET ORAL
Qty: 30 TABLET | Refills: 5 | Status: SHIPPED | OUTPATIENT
Start: 2023-04-04

## 2023-04-11 DIAGNOSIS — M79.671 PAIN IN BOTH FEET: ICD-10-CM

## 2023-04-11 DIAGNOSIS — M25.512 CHRONIC LEFT SHOULDER PAIN: ICD-10-CM

## 2023-04-11 DIAGNOSIS — G89.29 CHRONIC PAIN OF RIGHT KNEE: ICD-10-CM

## 2023-04-11 DIAGNOSIS — M25.561 CHRONIC PAIN OF RIGHT KNEE: ICD-10-CM

## 2023-04-11 DIAGNOSIS — G89.29 CHRONIC LEFT SHOULDER PAIN: ICD-10-CM

## 2023-04-11 DIAGNOSIS — M79.672 PAIN IN BOTH FEET: ICD-10-CM

## 2023-04-11 RX ORDER — MELOXICAM 15 MG/1
TABLET ORAL
Qty: 30 TABLET | Refills: 5 | Status: SHIPPED | OUTPATIENT
Start: 2023-04-11

## 2023-04-24 RX ORDER — MONTELUKAST SODIUM 10 MG/1
TABLET ORAL
Qty: 90 TABLET | Refills: 3 | Status: SHIPPED | OUTPATIENT
Start: 2023-04-24

## 2023-05-01 ENCOUNTER — TELEPHONE (OUTPATIENT)
Dept: GASTROENTEROLOGY | Age: 58
End: 2023-05-01

## 2023-05-03 ENCOUNTER — HOSPITAL ENCOUNTER (OUTPATIENT)
Age: 58
Setting detail: SPECIMEN
Discharge: HOME | End: 2023-05-03

## 2023-05-03 ENCOUNTER — TELEPHONE (OUTPATIENT)
Dept: FAMILY MEDICINE CLINIC | Age: 58
End: 2023-05-03

## 2023-05-03 ENCOUNTER — OFFICE VISIT (OUTPATIENT)
Dept: FAMILY MEDICINE CLINIC | Age: 58
End: 2023-05-03
Payer: OTHER GOVERNMENT

## 2023-05-03 VITALS
DIASTOLIC BLOOD PRESSURE: 75 MMHG | TEMPERATURE: 97.6 F | OXYGEN SATURATION: 96 % | HEART RATE: 74 BPM | SYSTOLIC BLOOD PRESSURE: 133 MMHG

## 2023-05-03 DIAGNOSIS — J06.9 VIRAL URI: Primary | ICD-10-CM

## 2023-05-03 DIAGNOSIS — J06.9 VIRAL URI: ICD-10-CM

## 2023-05-03 PROCEDURE — 99213 OFFICE O/P EST LOW 20 MIN: CPT

## 2023-05-03 ASSESSMENT — ENCOUNTER SYMPTOMS
COUGH: 1
SORE THROAT: 0
ABDOMINAL PAIN: 0
SINUS PAIN: 0
DIARRHEA: 1
VOMITING: 0
EYE PAIN: 0
SWOLLEN GLANDS: 1
CHANGE IN BOWEL HABIT: 1
EYE ITCHING: 0
NAUSEA: 0

## 2023-05-03 NOTE — PROGRESS NOTES
[facility]  [address] [address]  Dept: [phone]  Dept Fax: [phone]    Lyle Reynolds is a 65 y.o. male who presents to the urgent care today for his medical conditions/complaints as notedbelow. Lyle Reynolds is c/o of Headache (Onset yesterday with chills, body aches and headaches. Fatigue feeling on Friday. Pt's PCP wanted him to get covid testing. )      HPI:     URI   This is a new problem. The current episode started yesterday. The problem has been gradually worsening. There has been no fever. Associated symptoms include congestion, coughing, diarrhea, headaches, a plugged ear sensation and swollen glands. Pertinent negatives include no abdominal pain, chest pain, dysuria, ear pain, nausea, rash, sinus pain, sneezing, sore throat or vomiting. He has tried NSAIDs (allergy medication) for the symptoms. The treatment provided mild relief. Generalized Body Aches  This is a new problem. The current episode started yesterday. The problem occurs constantly. The problem has been gradually worsening. Associated symptoms include a change in bowel habit, congestion, coughing, fatigue, headaches and swollen glands. Pertinent negatives include no abdominal pain, chest pain, chills, fever, nausea, rash, sore throat or vomiting. He has tried NSAIDs for the symptoms. The treatment provided mild relief.      Past Medical History:   Diagnosis Date    Allergic rhinitis     Change in bowel function     Chronic fatigue     Combined arterial insufficiency and corporo-venous occlusive erectile dysfunction     COVID-19 02/2021    Excessive daytime sleepiness     Hypothyroidism     Dr. Caroline Goltz    Mixed hyperlipidemia     Screen for colon cancer     Sleep apnea with use of continuous positive airway pressure (CPAP)     Snoring         Current Outpatient Medications   Medication Sig Dispense Refill    montelukast

## 2023-05-03 NOTE — TELEPHONE ENCOUNTER
Patient is having c/o fatigue, chest congestion, slight phelgm , body aches and chills x 1 wk.  Patient has been advised and agreed to be evaluated at [facility] In clinic

## 2023-05-04 LAB
SARS-COV-2 RNA RESP QL NAA+PROBE: NORMAL
SARS-COV-2 RNA RESP QL NAA+PROBE: NOT DETECTED
SOURCE: NORMAL

## 2023-06-09 ENCOUNTER — OFFICE VISIT (OUTPATIENT)
Dept: FAMILY MEDICINE CLINIC | Age: 58
End: 2023-06-09
Payer: OTHER GOVERNMENT

## 2023-06-09 VITALS
WEIGHT: 176.8 LBS | BODY MASS INDEX: 26.88 KG/M2 | SYSTOLIC BLOOD PRESSURE: 120 MMHG | HEART RATE: 80 BPM | DIASTOLIC BLOOD PRESSURE: 82 MMHG

## 2023-06-09 DIAGNOSIS — M79.671 FOOT PAIN, BILATERAL: Primary | ICD-10-CM

## 2023-06-09 DIAGNOSIS — M79.672 FOOT PAIN, BILATERAL: Primary | ICD-10-CM

## 2023-06-09 PROCEDURE — 99213 OFFICE O/P EST LOW 20 MIN: CPT | Performed by: FAMILY MEDICINE

## 2023-06-09 SDOH — ECONOMIC STABILITY: FOOD INSECURITY: WITHIN THE PAST 12 MONTHS, YOU WORRIED THAT YOUR FOOD WOULD RUN OUT BEFORE YOU GOT MONEY TO BUY MORE.: NEVER TRUE

## 2023-06-09 SDOH — ECONOMIC STABILITY: HOUSING INSECURITY
IN THE LAST 12 MONTHS, WAS THERE A TIME WHEN YOU DID NOT HAVE A STEADY PLACE TO SLEEP OR SLEPT IN A SHELTER (INCLUDING NOW)?: NO

## 2023-06-09 SDOH — ECONOMIC STABILITY: FOOD INSECURITY: WITHIN THE PAST 12 MONTHS, THE FOOD YOU BOUGHT JUST DIDN'T LAST AND YOU DIDN'T HAVE MONEY TO GET MORE.: NEVER TRUE

## 2023-06-09 SDOH — ECONOMIC STABILITY: INCOME INSECURITY: HOW HARD IS IT FOR YOU TO PAY FOR THE VERY BASICS LIKE FOOD, HOUSING, MEDICAL CARE, AND HEATING?: NOT HARD AT ALL

## 2023-06-09 ASSESSMENT — PATIENT HEALTH QUESTIONNAIRE - PHQ9
1. LITTLE INTEREST OR PLEASURE IN DOING THINGS: 0
SUM OF ALL RESPONSES TO PHQ QUESTIONS 1-9: 0
2. FEELING DOWN, DEPRESSED OR HOPELESS: 0
SUM OF ALL RESPONSES TO PHQ QUESTIONS 1-9: 0
SUM OF ALL RESPONSES TO PHQ9 QUESTIONS 1 & 2: 0
SUM OF ALL RESPONSES TO PHQ QUESTIONS 1-9: 0
SUM OF ALL RESPONSES TO PHQ QUESTIONS 1-9: 0

## 2023-06-09 ASSESSMENT — ENCOUNTER SYMPTOMS
BACK PAIN: 1
ABDOMINAL PAIN: 0
ALLERGIC/IMMUNOLOGIC NEGATIVE: 1
EYES NEGATIVE: 1
COUGH: 0
SHORTNESS OF BREATH: 0

## 2023-06-09 NOTE — PROGRESS NOTES
exercise. Discussed use, benefit, and side effects of prescribed medications. Barriers to medication compliance addressed. Patient given educational materials - see patient instructions. All patient questions answered. Patient voiced understanding. Return if symptoms worsen or fail to improve.         Electronically signed by Mendy Doty MD on 6/9/23 at 8:37 AM EDT

## 2023-07-03 ENCOUNTER — OFFICE VISIT (OUTPATIENT)
Dept: PODIATRY | Age: 58
End: 2023-07-03

## 2023-07-03 VITALS — WEIGHT: 176 LBS | BODY MASS INDEX: 26.67 KG/M2 | HEIGHT: 68 IN

## 2023-07-03 DIAGNOSIS — M77.32 BILATERAL CALCANEAL SPURS: ICD-10-CM

## 2023-07-03 DIAGNOSIS — M77.31 BILATERAL CALCANEAL SPURS: ICD-10-CM

## 2023-07-03 DIAGNOSIS — M72.2 PLANTAR FASCIAL FIBROMATOSIS: Primary | ICD-10-CM

## 2023-07-03 RX ORDER — CETIRIZINE HYDROCHLORIDE 10 MG/1
10 TABLET ORAL DAILY
COMMUNITY
Start: 2023-04-14 | End: 2023-07-03

## 2023-08-02 ENCOUNTER — OFFICE VISIT (OUTPATIENT)
Dept: PODIATRY | Age: 58
End: 2023-08-02

## 2023-08-02 DIAGNOSIS — M72.2 PLANTAR FASCIAL FIBROMATOSIS: Primary | ICD-10-CM

## 2023-08-02 PROCEDURE — NBSRV NON-BILLABLE SERVICE: Performed by: PODIATRIST

## 2023-08-25 ENCOUNTER — OFFICE VISIT (OUTPATIENT)
Dept: FAMILY MEDICINE CLINIC | Age: 58
End: 2023-08-25
Payer: OTHER GOVERNMENT

## 2023-08-25 VITALS
SYSTOLIC BLOOD PRESSURE: 120 MMHG | HEART RATE: 76 BPM | WEIGHT: 185 LBS | BODY MASS INDEX: 28.13 KG/M2 | DIASTOLIC BLOOD PRESSURE: 80 MMHG

## 2023-08-25 DIAGNOSIS — G56.22 ENTRAPMENT OF LEFT ULNAR NERVE: Primary | ICD-10-CM

## 2023-08-25 PROCEDURE — 99212 OFFICE O/P EST SF 10 MIN: CPT | Performed by: FAMILY MEDICINE

## 2023-08-25 NOTE — PROGRESS NOTES
[address] [address]  Dept: [phone]    8/25/2023    CHIEF COMPLAINT    Chief Complaint   Patient presents with    Elbow Injury     pain       TIMI Hanna is a 65 y.o. male who presents   Chief Complaint   Patient presents with    Elbow Injury     pain   . C/o pain in left elbow since rolling over in bed and feeling a pop in his elbow 3 weeks ago. Has been sore, tender to touch. Gets a burning sensation if he bumps his elbow on the medial aspect.        Vitals:    08/25/23 0935   BP: 120/80   Pulse: 76   Weight: 185 lb (83.9 kg)       REVIEW OF SYSTEMS    Review of Systems   Neurological:         Burning sensation left medial elbow     PAST MEDICAL HISTORY    Past Medical History:   Diagnosis Date    Allergic rhinitis     Change in bowel function     Chronic fatigue     Combined arterial insufficiency and corporo-venous occlusive erectile dysfunction     COVID-19 02/2021    Excessive daytime sleepiness     Hypothyroidism     Dr. Shantelle Johnson    Mixed hyperlipidemia     Screen for colon cancer     Sleep apnea with use of continuous positive airway pressure (CPAP)     Snoring        FAMILY HISTORY    Family History   Problem Relation Age of Onset    Heart Disease Mother     Pacemaker Mother     Thyroid Disease Mother     Cancer Father     Stroke Father     Lung Cancer Father     Brain Cancer Father     High Blood Pressure Sister     Thyroid Disease Brother     Thyroid Disease Brother        SOCIAL HISTORY    Social History     Socioeconomic History    Marital status:      Spouse name: None    Number of children: None    Years of education: None    Highest education level: None   Occupational History    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, 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

## 2023-09-14 NOTE — TELEPHONE ENCOUNTER
Ofelia Benavides is calling to request a refill on the following medication(s):    Last Visit Date (If Applicable):  0/35/9529    Next Visit Date:    Visit date not found    Medication Request:  Requested Prescriptions     Pending Prescriptions Disp Refills    levothyroxine (SYNTHROID) 125 MCG tablet [Pharmacy Med Name: Levothyroxine Sodium Oral Tablet 125 MCG] 30 tablet 0     Sig: TAKE 1 TABLET BY MOUTH EVERY DAY

## 2023-09-17 RX ORDER — LEVOTHYROXINE SODIUM 0.12 MG/1
TABLET ORAL
Qty: 30 TABLET | Refills: 0 | Status: SHIPPED | OUTPATIENT
Start: 2023-09-17

## 2023-09-25 RX ORDER — LEVOTHYROXINE SODIUM 0.12 MG/1
TABLET ORAL
Qty: 30 TABLET | Refills: 0 | OUTPATIENT
Start: 2023-09-25

## 2023-09-26 ENCOUNTER — OFFICE VISIT (OUTPATIENT)
Dept: FAMILY MEDICINE CLINIC | Age: 58
End: 2023-09-26
Payer: OTHER GOVERNMENT

## 2023-09-26 VITALS
BODY MASS INDEX: 28.65 KG/M2 | DIASTOLIC BLOOD PRESSURE: 72 MMHG | HEART RATE: 55 BPM | SYSTOLIC BLOOD PRESSURE: 130 MMHG | WEIGHT: 188.4 LBS

## 2023-09-26 DIAGNOSIS — H69.92 EUSTACHIAN TUBE DISORDER, LEFT: Primary | ICD-10-CM

## 2023-09-26 DIAGNOSIS — J30.89 NON-SEASONAL ALLERGIC RHINITIS, UNSPECIFIED TRIGGER: ICD-10-CM

## 2023-09-26 PROCEDURE — 99213 OFFICE O/P EST LOW 20 MIN: CPT | Performed by: FAMILY MEDICINE

## 2023-09-26 ASSESSMENT — ENCOUNTER SYMPTOMS
ALLERGIC/IMMUNOLOGIC NEGATIVE: 1
DIARRHEA: 0
SHORTNESS OF BREATH: 0
EYES NEGATIVE: 1
BLOOD IN STOOL: 0
ABDOMINAL PAIN: 0
COUGH: 0
CONSTIPATION: 0

## 2023-09-26 NOTE — PROGRESS NOTES
[address] [address]  Dept: [phone]    9/26/2023    CHIEF COMPLAINT    Chief Complaint   Patient presents with    Ear Fullness       HPI    Ramiro Villareal is a 63 y.o. male who presents   Chief Complaint   Patient presents with    Ear Fullness   . Patient presents with complaint of fullness in his left ear for several days. He is being treated for allergies and receiving immunotherapy injections. Denies recent swimming, diving or air travel. Vitals:    09/26/23 1021   BP: 130/72   Pulse: 55   Weight: 188 lb 6.4 oz (85.5 kg)       REVIEW OF SYSTEMS    Review of Systems   Constitutional:  Negative for fatigue, fever and unexpected weight change. HENT:  Positive for congestion and ear pain. Eyes: Negative. Respiratory:  Negative for cough and shortness of breath. Cardiovascular:  Negative for chest pain and leg swelling. Gastrointestinal:  Negative for abdominal pain, blood in stool, constipation and diarrhea. Endocrine: Negative. Genitourinary:  Negative for frequency and urgency. Musculoskeletal: Negative. Skin: Negative. Allergic/Immunologic: Negative. Neurological:  Negative for dizziness and headaches. Hematological: Negative. Psychiatric/Behavioral:  Negative for sleep disturbance. The patient is not nervous/anxious.         PAST MEDICAL HISTORY    Past Medical History:   Diagnosis Date    Allergic rhinitis     Change in bowel function     Chronic fatigue     Combined arterial insufficiency and corporo-venous occlusive erectile dysfunction     COVID-19 02/2021    Excessive daytime sleepiness     Hypothyroidism     Dr. Ridge Castillo    Mixed hyperlipidemia     Screen for colon cancer     Sleep apnea with use of continuous positive airway pressure (CPAP)     Snoring        FAMILY HISTORY    Family History   Problem Relation Age of Onset    Heart Disease Mother     Pacemaker

## 2023-09-27 ENCOUNTER — OFFICE VISIT (OUTPATIENT)
Dept: FAMILY MEDICINE CLINIC | Age: 58
End: 2023-09-27
Payer: OTHER GOVERNMENT

## 2023-09-27 VITALS
BODY MASS INDEX: 28.49 KG/M2 | SYSTOLIC BLOOD PRESSURE: 128 MMHG | DIASTOLIC BLOOD PRESSURE: 75 MMHG | HEART RATE: 50 BPM | HEIGHT: 68 IN | WEIGHT: 188 LBS | OXYGEN SATURATION: 96 %

## 2023-09-27 DIAGNOSIS — H65.03 NON-RECURRENT ACUTE SEROUS OTITIS MEDIA OF BOTH EARS: Primary | ICD-10-CM

## 2023-09-27 PROCEDURE — 99213 OFFICE O/P EST LOW 20 MIN: CPT | Performed by: FAMILY MEDICINE

## 2023-09-27 RX ORDER — AMOXICILLIN AND CLAVULANATE POTASSIUM 875; 125 MG/1; MG/1
1 TABLET, FILM COATED ORAL 2 TIMES DAILY
Qty: 20 TABLET | Refills: 0 | Status: SHIPPED | OUTPATIENT
Start: 2023-09-27 | End: 2023-10-07

## 2023-09-27 RX ORDER — GUAIFENESIN 1200 MG/1
1200 TABLET, EXTENDED RELEASE ORAL 2 TIMES DAILY PRN
Qty: 30 TABLET | Refills: 1 | Status: SHIPPED | OUTPATIENT
Start: 2023-09-27 | End: 2023-10-12

## 2023-09-27 RX ORDER — CIPROFLOXACIN AND DEXAMETHASONE 3; 1 MG/ML; MG/ML
4 SUSPENSION/ DROPS AURICULAR (OTIC) 2 TIMES DAILY
Qty: 1 EACH | Refills: 0 | Status: SHIPPED | OUTPATIENT
Start: 2023-09-27 | End: 2023-10-04

## 2023-09-27 NOTE — PROGRESS NOTES
[facility]  [address] [place] [address] [address] [address]  Dept: [phone]      Joni Childers is a 63 y.o. male who presents today for follow up on his  medical conditions as noted below.       Chief Complaint   Patient presents with    Ear Drainage     Left        Patient Active Problem List:     Hypothyroidism     Regular astigmatism     Allergic rhinitis     Change in bowel function     Diarrhea     Liquid stool     COVID-19     Diverticulosis of colon     Tubular adenoma of colon     Past Medical History:   Diagnosis Date    Allergic rhinitis     Change in bowel function     Chronic fatigue     Combined arterial insufficiency and corporo-venous occlusive erectile dysfunction     COVID-19 02/2021    Excessive daytime sleepiness     Hypothyroidism     Dr. Prado Snooks    Mixed hyperlipidemia     Screen for colon cancer     Sleep apnea with use of continuous positive airway pressure (CPAP)     Snoring       Past Surgical History:   Procedure Laterality Date    COLONOSCOPY N/A 4/19/2021    COLONOSCOPY POLYPECTOMY performed by Sunny Rae MD at [address] [place] [address]      Family History   Problem Relation Age of Onset    Heart Disease Mother     Pacemaker Mother     Thyroid Disease Mother     Cancer Father     Stroke Father     Lung Cancer Father     Brain Cancer Father     High Blood Pressure Sister     Thyroid Disease Brother     Thyroid Disease Brother        Current Outpatient Medications   Medication Sig Dispense Refill    amoxicillin-clavulanate (AUGMENTIN) 875-125 MG per tablet Take 1 tablet by mouth 2 times daily for 10 days 20 tablet 0    ciprofloxacin-dexamethasone (CIPRODEX) 0.3-0.1 % otic suspension Place 4 drops into the left ear 2 times daily for 7 days 1 each 0    guaiFENesin (MUCINEX MAXIMUM STRENGTH) 1200 MG TB12 Take 1,200 mg by mouth 2 times daily as needed (congestion) 30 tablet 1    levothyroxine (SYNTHROID) 125 MCG tablet

## 2023-09-29 ENCOUNTER — TELEPHONE (OUTPATIENT)
Dept: FAMILY MEDICINE CLINIC | Age: 58
End: 2023-09-29

## 2023-09-29 NOTE — TELEPHONE ENCOUNTER
Patient called n stating he came in last week due to having a ear infection and he got prescribed amoxicillin he stated  he has had nothing but loose stools and he is not sure if he is needing to be on a different abx or not       Please advise

## 2023-10-16 RX ORDER — LEVOTHYROXINE SODIUM 0.12 MG/1
TABLET ORAL
Qty: 30 TABLET | Refills: 5 | Status: SHIPPED | OUTPATIENT
Start: 2023-10-16

## 2023-11-01 DIAGNOSIS — G89.29 CHRONIC LEFT SHOULDER PAIN: ICD-10-CM

## 2023-11-01 DIAGNOSIS — M79.671 PAIN IN BOTH FEET: ICD-10-CM

## 2023-11-01 DIAGNOSIS — G89.29 CHRONIC PAIN OF RIGHT KNEE: ICD-10-CM

## 2023-11-01 DIAGNOSIS — M25.561 CHRONIC PAIN OF RIGHT KNEE: ICD-10-CM

## 2023-11-01 DIAGNOSIS — M25.512 CHRONIC LEFT SHOULDER PAIN: ICD-10-CM

## 2023-11-01 DIAGNOSIS — M79.672 PAIN IN BOTH FEET: ICD-10-CM

## 2023-11-01 NOTE — TELEPHONE ENCOUNTER
Margy Montez is calling to request a refill on the following medication(s):    Last Visit Date (If Applicable):  2/68/8054    Next Visit Date:    Visit date not found    Medication Request:  Requested Prescriptions     Pending Prescriptions Disp Refills    meloxicam (MOBIC) 15 MG tablet [Pharmacy Med Name: Meloxicam Oral Tablet 15 MG] 30 tablet 0     Sig: TAKE 1 TABLET BY MOUTH EVERY DAY WITH FOOD

## 2023-11-02 RX ORDER — MELOXICAM 15 MG/1
TABLET ORAL
Qty: 30 TABLET | Refills: 5 | Status: SHIPPED | OUTPATIENT
Start: 2023-11-02

## 2023-11-03 ENCOUNTER — OFFICE VISIT (OUTPATIENT)
Dept: PULMONOLOGY | Age: 58
End: 2023-11-03
Payer: OTHER GOVERNMENT

## 2023-11-03 VITALS
BODY MASS INDEX: 29.1 KG/M2 | DIASTOLIC BLOOD PRESSURE: 75 MMHG | TEMPERATURE: 98.2 F | HEIGHT: 68 IN | HEART RATE: 56 BPM | SYSTOLIC BLOOD PRESSURE: 120 MMHG | WEIGHT: 192 LBS | OXYGEN SATURATION: 96 % | RESPIRATION RATE: 16 BRPM

## 2023-11-03 DIAGNOSIS — G47.33 OSA ON CPAP: Primary | ICD-10-CM

## 2023-11-03 DIAGNOSIS — G47.26 CIRCADIAN RHYTHM SLEEP DISORDER, SHIFT WORK TYPE: ICD-10-CM

## 2023-11-03 DIAGNOSIS — J30.2 SEASONAL ALLERGIES: ICD-10-CM

## 2023-11-03 PROCEDURE — 99213 OFFICE O/P EST LOW 20 MIN: CPT | Performed by: INTERNAL MEDICINE

## 2023-11-03 RX ORDER — CETIRIZINE HYDROCHLORIDE 10 MG/1
10 TABLET ORAL DAILY
COMMUNITY
Start: 2023-09-21

## 2023-11-03 ASSESSMENT — SLEEP AND FATIGUE QUESTIONNAIRES
HOW LIKELY ARE YOU TO NOD OFF OR FALL ASLEEP WHILE LYING DOWN TO REST IN THE AFTERNOON WHEN CIRCUMSTANCES PERMIT: 2
HOW LIKELY ARE YOU TO NOD OFF OR FALL ASLEEP WHILE SITTING INACTIVE IN A PUBLIC PLACE: 0
HOW LIKELY ARE YOU TO NOD OFF OR FALL ASLEEP WHILE SITTING AND READING: 0
ESS TOTAL SCORE: 2
HOW LIKELY ARE YOU TO NOD OFF OR FALL ASLEEP WHILE SITTING AND TALKING TO SOMEONE: 0
HOW LIKELY ARE YOU TO NOD OFF OR FALL ASLEEP IN A CAR, WHILE STOPPED FOR A FEW MINUTES IN TRAFFIC: 0
HOW LIKELY ARE YOU TO NOD OFF OR FALL ASLEEP WHEN YOU ARE A PASSENGER IN A CAR FOR AN HOUR WITHOUT A BREAK: 0
HOW LIKELY ARE YOU TO NOD OFF OR FALL ASLEEP WHILE WATCHING TV: 0
HOW LIKELY ARE YOU TO NOD OFF OR FALL ASLEEP WHILE SITTING QUIETLY AFTER LUNCH WITHOUT ALCOHOL: 0

## 2023-11-03 ASSESSMENT — ENCOUNTER SYMPTOMS
RESPIRATORY NEGATIVE: 1
EYES NEGATIVE: 1

## 2023-11-03 NOTE — PATIENT INSTRUCTIONS
Faxing order and office note to [address]. Patient needs one year follow up appointment in [place] in November 2024.   [email or web address]

## 2024-01-08 ENCOUNTER — OFFICE VISIT (OUTPATIENT)
Dept: FAMILY MEDICINE CLINIC | Age: 59
End: 2024-01-08
Payer: OTHER GOVERNMENT

## 2024-01-08 VITALS
WEIGHT: 190.6 LBS | DIASTOLIC BLOOD PRESSURE: 78 MMHG | BODY MASS INDEX: 28.98 KG/M2 | HEART RATE: 80 BPM | SYSTOLIC BLOOD PRESSURE: 122 MMHG

## 2024-01-08 DIAGNOSIS — M79.672 PAIN IN BOTH FEET: ICD-10-CM

## 2024-01-08 DIAGNOSIS — R63.5 WEIGHT GAIN: ICD-10-CM

## 2024-01-08 DIAGNOSIS — K21.9 GASTROESOPHAGEAL REFLUX DISEASE WITHOUT ESOPHAGITIS: Primary | ICD-10-CM

## 2024-01-08 DIAGNOSIS — R05.3 CHRONIC COUGH: ICD-10-CM

## 2024-01-08 DIAGNOSIS — M79.671 PAIN IN BOTH FEET: ICD-10-CM

## 2024-01-08 PROCEDURE — 99214 OFFICE O/P EST MOD 30 MIN: CPT | Performed by: FAMILY MEDICINE

## 2024-01-08 RX ORDER — FLUTICASONE PROPIONATE 50 MCG
SPRAY, SUSPENSION (ML) NASAL
COMMUNITY
Start: 2023-12-04

## 2024-01-08 RX ORDER — PANTOPRAZOLE SODIUM 20 MG/1
20 TABLET, DELAYED RELEASE ORAL
Qty: 30 TABLET | Refills: 5 | Status: SHIPPED | OUTPATIENT
Start: 2024-01-08

## 2024-01-08 ASSESSMENT — ENCOUNTER SYMPTOMS
DIARRHEA: 0
ALLERGIC/IMMUNOLOGIC NEGATIVE: 1
CONSTIPATION: 0
BLOOD IN STOOL: 0
COUGH: 1
EYES NEGATIVE: 1
SHORTNESS OF BREATH: 0
ABDOMINAL PAIN: 0
APNEA: 1

## 2024-01-08 ASSESSMENT — PATIENT HEALTH QUESTIONNAIRE - PHQ9
SUM OF ALL RESPONSES TO PHQ QUESTIONS 1-9: 0
2. FEELING DOWN, DEPRESSED OR HOPELESS: 0
1. LITTLE INTEREST OR PLEASURE IN DOING THINGS: 0
SUM OF ALL RESPONSES TO PHQ QUESTIONS 1-9: 0
SUM OF ALL RESPONSES TO PHQ9 QUESTIONS 1 & 2: 0

## 2024-01-08 NOTE — PROGRESS NOTES
tablet TAKE 1 TABLET BY MOUTH EVERY DAY WITH FOOD 30 tablet 5    levothyroxine (SYNTHROID) 125 MCG tablet TAKE 1 TABLET BY MOUTH EVERY DAY 30 tablet 5    montelukast (SINGULAIR) 10 MG tablet TAKE 1 TABLET BY MOUTH EVERY NIGHT 90 tablet 3    Magnesium 300 MG CAPS Take 1 capsule by mouth 2 times daily 60 capsule 5    Azelastine HCl 137 MCG/SPRAY SOLN SPRAY TWO SPRAYS IN EACH NOSTRIL TWICE DAILY 30 mL 2    saline nasal gel (AYR) GEL by Nasal route as needed for Congestion 1 each 3     No current facility-administered medications for this visit.       ALLERGIES    Allergies   Allergen Reactions    Codeine     Requip [Ropinirole Hcl]        PHYSICAL EXAM   Physical Exam  Vitals reviewed.   Constitutional:       Appearance: He is well-developed.   HENT:      Head: Normocephalic.   Eyes:      Pupils: Pupils are equal, round, and reactive to light.   Neck:      Thyroid: No thyromegaly.   Cardiovascular:      Rate and Rhythm: Normal rate and regular rhythm.      Heart sounds: Normal heart sounds. No murmur heard.  Pulmonary:      Effort: Pulmonary effort is normal.      Breath sounds: Normal breath sounds. No wheezing or rales.   Abdominal:      Palpations: Abdomen is soft.      Tenderness: There is abdominal tenderness (epigastrium). There is no guarding or rebound.   Musculoskeletal:         General: No tenderness or deformity. Normal range of motion.      Cervical back: Normal range of motion and neck supple.   Lymphadenopathy:      Cervical: No cervical adenopathy.   Skin:     General: Skin is warm and dry.   Neurological:      Mental Status: He is alert and oriented to person, place, and time.   Psychiatric:         Mood and Affect: Mood normal.         Behavior: Behavior normal.         Thought Content: Thought content normal.         Judgment: Judgment normal.         ASSESSMENT/PLAN  1. Gastroesophageal reflux disease without esophagitis  Discussed reflux precautions. Have ent look at larynx also  - pantoprazole

## 2024-01-09 ENCOUNTER — TELEPHONE (OUTPATIENT)
Dept: FAMILY MEDICINE CLINIC | Age: 59
End: 2024-01-09

## 2024-01-09 DIAGNOSIS — U07.1 COVID-19: Primary | ICD-10-CM

## 2024-01-09 NOTE — TELEPHONE ENCOUNTER
Patient called and stated he went to [facility] today and was tested for COVID 19 , It was positive and he would like paxlovid called in.Was seen in office yesterday.

## 2024-02-20 ENCOUNTER — TELEPHONE (OUTPATIENT)
Dept: PULMONOLOGY | Age: 59
End: 2024-02-20

## 2024-02-20 DIAGNOSIS — G47.33 OSA ON CPAP: Primary | ICD-10-CM

## 2024-02-20 NOTE — TELEPHONE ENCOUNTER
Patient is going to have sinus surgery soon and is on a CPAP with nasal pillows. He needs an order for a new mask to use post operative. I wrote an order for a full face mask. Please sign the order and I'll fax to his [employer] company. Thank you

## 2024-03-01 ENCOUNTER — HOSPITAL ENCOUNTER (OUTPATIENT)
Dept: PREADMISSION TESTING | Age: 59
End: 2024-03-01
Payer: OTHER GOVERNMENT

## 2024-03-01 VITALS
HEART RATE: 51 BPM | OXYGEN SATURATION: 97 % | HEIGHT: 68 IN | DIASTOLIC BLOOD PRESSURE: 69 MMHG | RESPIRATION RATE: 18 BRPM | WEIGHT: 180 LBS | SYSTOLIC BLOOD PRESSURE: 135 MMHG | BODY MASS INDEX: 27.28 KG/M2 | TEMPERATURE: 97.7 F

## 2024-03-01 LAB
ANION GAP SERPL CALCULATED.3IONS-SCNC: 12 MMOL/L (ref 9–17)
BUN SERPL-MCNC: 14 MG/DL (ref 6–20)
BUN/CREAT SERPL: 14 (ref 9–20)
CALCIUM SERPL-MCNC: 9.1 MG/DL (ref 8.6–10.4)
CHLORIDE SERPL-SCNC: 99 MMOL/L (ref 98–107)
CO2 SERPL-SCNC: 24 MMOL/L (ref 20–31)
CREAT SERPL-MCNC: 1 MG/DL (ref 0.7–1.2)
ERYTHROCYTE [DISTWIDTH] IN BLOOD BY AUTOMATED COUNT: 12.2 % (ref 11.8–14.4)
GFR SERPL CREATININE-BSD FRML MDRD: >60 ML/MIN/1.73M2
GLUCOSE SERPL-MCNC: 99 MG/DL (ref 70–99)
HCT VFR BLD AUTO: 44.7 % (ref 40.7–50.3)
HGB BLD-MCNC: 15.4 G/DL (ref 13–17)
MCH RBC QN AUTO: 30.5 PG (ref 25.2–33.5)
MCHC RBC AUTO-ENTMCNC: 34.5 G/DL (ref 28.4–34.8)
MCV RBC AUTO: 88.5 FL (ref 82.6–102.9)
NRBC BLD-RTO: 0 PER 100 WBC
PLATELET # BLD AUTO: 211 K/UL (ref 138–453)
PMV BLD AUTO: 9.2 FL (ref 8.1–13.5)
POTASSIUM SERPL-SCNC: 4.3 MMOL/L (ref 3.7–5.3)
RBC # BLD AUTO: 5.05 M/UL (ref 4.21–5.77)
SODIUM SERPL-SCNC: 135 MMOL/L (ref 135–144)
WBC OTHER # BLD: 4.7 K/UL (ref 3.5–11.3)

## 2024-03-01 PROCEDURE — 36415 COLL VENOUS BLD VENIPUNCTURE: CPT

## 2024-03-01 PROCEDURE — 93005 ELECTROCARDIOGRAM TRACING: CPT | Performed by: ANESTHESIOLOGY

## 2024-03-01 PROCEDURE — 80048 BASIC METABOLIC PNL TOTAL CA: CPT

## 2024-03-01 PROCEDURE — 85027 COMPLETE CBC AUTOMATED: CPT

## 2024-03-01 RX ORDER — OXYMETAZOLINE HYDROCHLORIDE 0.05 G/100ML
2 SPRAY NASAL ONCE
OUTPATIENT
Start: 2024-03-18 | End: 2024-03-20

## 2024-03-01 NOTE — PROGRESS NOTES
ARRIVE AT THE HOSPITAL ON Monday, 3/18/24 Per Dr Kim joiner    Once you enter the hospital lobby, take the elevators to the second floor.  Check-In is at the surgery registration desk.      Continue to take your home medications as you normally do up to and including the night before surgery with the exception of any blood thinning medications.      Blood Thinning Medications:  Please stop prescription blood thinning medications such as Apixaban (Eliquis); Clopidogrel (Plavix); Dabigatran (Pradaxa); Prasugrel (Effient); Rivaroxaban (Xarelto); Ticagrelor (Brilinta); Warfarin (Coumadin) only as directed by your surgeon and/or the prescribing physician    Some common examples of other medications that can thin your blood are: Aspirin, Ibuprofen (Advil, Motrin), Naproxen (Aleve), Meloxicam (Mobic), Celecoxib (Celebrex), Fish Oil, many Herbal Supplements.  These medications should usually be stopped at least 7 days prior to surgery.    Meloxicam and daily vitamin    Tylenol is OK to take for pain the week prior to surgery.    Failure to stop certain medications may interfere with your scheduled surgery.    If you receive instructions from your surgeon regarding what medications to stop prior to surgery, please follow those specific instructions.      If You Have Diabetes:  Do not take any of your diabetic medications, (injectables or by mouth) the morning of surgery unless otherwise instructed by the doctor who manages your diabetes. If you are taking insulin, contact the doctor the manages your diabetes for instructions about any changes to your insulin dosages the day before surgery.        Please take the following medication(s) the day of surgery with a small sip of water:  Levothyroxine and pantoprazole    Please use your inhaler(s) if needed and bring your inhaler(s) from home the day of surgery      PREPARING FOR YOUR SURGERY:     Before surgery, you can play an important role in your own health. Because skin is

## 2024-03-02 LAB
EKG ATRIAL RATE: 52 BPM
EKG P AXIS: 39 DEGREES
EKG P-R INTERVAL: 164 MS
EKG Q-T INTERVAL: 410 MS
EKG QRS DURATION: 84 MS
EKG QTC CALCULATION (BAZETT): 381 MS
EKG R AXIS: 1 DEGREES
EKG T AXIS: 51 DEGREES
EKG VENTRICULAR RATE: 52 BPM

## 2024-03-13 RX ORDER — MONTELUKAST SODIUM 10 MG/1
TABLET ORAL
Qty: 90 TABLET | Refills: 3 | Status: SHIPPED | OUTPATIENT
Start: 2024-03-13

## 2024-03-13 NOTE — TELEPHONE ENCOUNTER
Xander Harrington is calling to request a refill on the following medication(s):    Last Visit Date (If Applicable):  1/8/2024    Next Visit Date:    Visit date not found    Medication Request:  Requested Prescriptions     Pending Prescriptions Disp Refills    montelukast (SINGULAIR) 10 MG tablet [Pharmacy Med Name: Montelukast Sodium Oral Tablet 10 MG] 90 tablet 0     Sig: TAKE 1 TABLET BY MOUTH EVERY NIGHT

## 2024-03-18 ENCOUNTER — HOSPITAL ENCOUNTER (OUTPATIENT)
Age: 59
Setting detail: OUTPATIENT SURGERY
Discharge: HOME | End: 2024-03-18
Attending: OTOLARYNGOLOGY | Admitting: OTOLARYNGOLOGY
Payer: OTHER GOVERNMENT

## 2024-03-18 ENCOUNTER — ANESTHESIA (OUTPATIENT)
Dept: OPERATING ROOM | Age: 59
End: 2024-03-18
Payer: OTHER GOVERNMENT

## 2024-03-18 ENCOUNTER — ANESTHESIA EVENT (OUTPATIENT)
Dept: OPERATING ROOM | Age: 59
End: 2024-03-18
Payer: OTHER GOVERNMENT

## 2024-03-18 VITALS
OXYGEN SATURATION: 99 % | WEIGHT: 180 LBS | SYSTOLIC BLOOD PRESSURE: 166 MMHG | HEART RATE: 48 BPM | RESPIRATION RATE: 12 BRPM | BODY MASS INDEX: 27.28 KG/M2 | TEMPERATURE: 97.2 F | HEIGHT: 68 IN | DIASTOLIC BLOOD PRESSURE: 94 MMHG

## 2024-03-18 DIAGNOSIS — G89.18 POST-OPERATIVE PAIN: Primary | ICD-10-CM

## 2024-03-18 PROCEDURE — 7100000010 HC PHASE II RECOVERY - FIRST 15 MIN: Performed by: OTOLARYNGOLOGY

## 2024-03-18 PROCEDURE — 7100000011 HC PHASE II RECOVERY - ADDTL 15 MIN: Performed by: OTOLARYNGOLOGY

## 2024-03-18 PROCEDURE — 7100000000 HC PACU RECOVERY - FIRST 15 MIN: Performed by: OTOLARYNGOLOGY

## 2024-03-18 PROCEDURE — 3600000002 HC SURGERY LEVEL 2 BASE: Performed by: OTOLARYNGOLOGY

## 2024-03-18 PROCEDURE — 3600000012 HC SURGERY LEVEL 2 ADDTL 15MIN: Performed by: OTOLARYNGOLOGY

## 2024-03-18 PROCEDURE — 2580000003 HC RX 258: Performed by: ANESTHESIOLOGY

## 2024-03-18 PROCEDURE — 7100000001 HC PACU RECOVERY - ADDTL 15 MIN: Performed by: OTOLARYNGOLOGY

## 2024-03-18 PROCEDURE — 2580000003 HC RX 258: Performed by: OTOLARYNGOLOGY

## 2024-03-18 PROCEDURE — 2500000003 HC RX 250 WO HCPCS: Performed by: OTOLARYNGOLOGY

## 2024-03-18 PROCEDURE — 3700000000 HC ANESTHESIA ATTENDED CARE: Performed by: OTOLARYNGOLOGY

## 2024-03-18 PROCEDURE — 3700000001 HC ADD 15 MINUTES (ANESTHESIA): Performed by: OTOLARYNGOLOGY

## 2024-03-18 PROCEDURE — 2720000010 HC SURG SUPPLY STERILE: Performed by: OTOLARYNGOLOGY

## 2024-03-18 PROCEDURE — 2709999900 HC NON-CHARGEABLE SUPPLY: Performed by: OTOLARYNGOLOGY

## 2024-03-18 PROCEDURE — 2500000003 HC RX 250 WO HCPCS: Performed by: NURSE ANESTHETIST, CERTIFIED REGISTERED

## 2024-03-18 PROCEDURE — 6360000002 HC RX W HCPCS: Performed by: NURSE ANESTHETIST, CERTIFIED REGISTERED

## 2024-03-18 PROCEDURE — A4216 STERILE WATER/SALINE, 10 ML: HCPCS | Performed by: OTOLARYNGOLOGY

## 2024-03-18 PROCEDURE — 6370000000 HC RX 637 (ALT 250 FOR IP): Performed by: OTOLARYNGOLOGY

## 2024-03-18 PROCEDURE — 6360000002 HC RX W HCPCS: Performed by: OTOLARYNGOLOGY

## 2024-03-18 RX ORDER — SODIUM CHLORIDE 0.9 % (FLUSH) 0.9 %
5-40 SYRINGE (ML) INJECTION PRN
Status: DISCONTINUED | OUTPATIENT
Start: 2024-03-18 | End: 2024-03-18 | Stop reason: HOSPADM

## 2024-03-18 RX ORDER — CEPHALEXIN 500 MG/1
500 CAPSULE ORAL 3 TIMES DAILY
Qty: 21 CAPSULE | Refills: 0 | Status: SHIPPED | OUTPATIENT
Start: 2024-03-18 | End: 2024-03-25

## 2024-03-18 RX ORDER — DIPHENHYDRAMINE HYDROCHLORIDE 50 MG/ML
12.5 INJECTION INTRAMUSCULAR; INTRAVENOUS
Status: DISCONTINUED | OUTPATIENT
Start: 2024-03-18 | End: 2024-03-18 | Stop reason: HOSPADM

## 2024-03-18 RX ORDER — HYDROCODONE BITARTRATE AND ACETAMINOPHEN 5; 325 MG/1; MG/1
1 TABLET ORAL EVERY 4 HOURS PRN
Qty: 30 TABLET | Refills: 0 | Status: SHIPPED | OUTPATIENT
Start: 2024-03-18 | End: 2024-03-23

## 2024-03-18 RX ORDER — ONDANSETRON 2 MG/ML
4 INJECTION INTRAMUSCULAR; INTRAVENOUS
Status: DISCONTINUED | OUTPATIENT
Start: 2024-03-18 | End: 2024-03-18 | Stop reason: HOSPADM

## 2024-03-18 RX ORDER — ONDANSETRON 2 MG/ML
INJECTION INTRAMUSCULAR; INTRAVENOUS PRN
Status: DISCONTINUED | OUTPATIENT
Start: 2024-03-18 | End: 2024-03-18 | Stop reason: SDUPTHER

## 2024-03-18 RX ORDER — LIDOCAINE HYDROCHLORIDE AND EPINEPHRINE 10; 10 MG/ML; UG/ML
INJECTION, SOLUTION INFILTRATION; PERINEURAL PRN
Status: DISCONTINUED | OUTPATIENT
Start: 2024-03-18 | End: 2024-03-18 | Stop reason: ALTCHOICE

## 2024-03-18 RX ORDER — PROPOFOL 10 MG/ML
INJECTION, EMULSION INTRAVENOUS PRN
Status: DISCONTINUED | OUTPATIENT
Start: 2024-03-18 | End: 2024-03-18 | Stop reason: SDUPTHER

## 2024-03-18 RX ORDER — FENTANYL CITRATE 50 UG/ML
INJECTION, SOLUTION INTRAMUSCULAR; INTRAVENOUS PRN
Status: DISCONTINUED | OUTPATIENT
Start: 2024-03-18 | End: 2024-03-18 | Stop reason: SDUPTHER

## 2024-03-18 RX ORDER — OXYCODONE HYDROCHLORIDE 5 MG/1
5 TABLET ORAL
Status: DISCONTINUED | OUTPATIENT
Start: 2024-03-18 | End: 2024-03-18 | Stop reason: HOSPADM

## 2024-03-18 RX ORDER — SODIUM CHLORIDE 0.9 % (FLUSH) 0.9 %
5-40 SYRINGE (ML) INJECTION EVERY 12 HOURS SCHEDULED
Status: DISCONTINUED | OUTPATIENT
Start: 2024-03-18 | End: 2024-03-18 | Stop reason: HOSPADM

## 2024-03-18 RX ORDER — OXYMETAZOLINE HYDROCHLORIDE 0.05 G/100ML
SPRAY NASAL PRN
Status: DISCONTINUED | OUTPATIENT
Start: 2024-03-18 | End: 2024-03-18 | Stop reason: ALTCHOICE

## 2024-03-18 RX ORDER — FENTANYL CITRATE 50 UG/ML
25 INJECTION, SOLUTION INTRAMUSCULAR; INTRAVENOUS EVERY 5 MIN PRN
Status: DISCONTINUED | OUTPATIENT
Start: 2024-03-18 | End: 2024-03-18 | Stop reason: HOSPADM

## 2024-03-18 RX ORDER — SODIUM CHLORIDE 9 MG/ML
INJECTION, SOLUTION INTRAVENOUS CONTINUOUS
Status: DISCONTINUED | OUTPATIENT
Start: 2024-03-18 | End: 2024-03-18 | Stop reason: HOSPADM

## 2024-03-18 RX ORDER — FENTANYL CITRATE 50 UG/ML
50 INJECTION, SOLUTION INTRAMUSCULAR; INTRAVENOUS EVERY 5 MIN PRN
Status: DISCONTINUED | OUTPATIENT
Start: 2024-03-18 | End: 2024-03-18 | Stop reason: HOSPADM

## 2024-03-18 RX ORDER — DEXAMETHASONE SODIUM PHOSPHATE 10 MG/ML
INJECTION, SOLUTION INTRAMUSCULAR; INTRAVENOUS PRN
Status: DISCONTINUED | OUTPATIENT
Start: 2024-03-18 | End: 2024-03-18 | Stop reason: SDUPTHER

## 2024-03-18 RX ORDER — GINSENG 100 MG
CAPSULE ORAL PRN
Status: DISCONTINUED | OUTPATIENT
Start: 2024-03-18 | End: 2024-03-18 | Stop reason: ALTCHOICE

## 2024-03-18 RX ORDER — SODIUM CHLORIDE, SODIUM LACTATE, POTASSIUM CHLORIDE, CALCIUM CHLORIDE 600; 310; 30; 20 MG/100ML; MG/100ML; MG/100ML; MG/100ML
INJECTION, SOLUTION INTRAVENOUS CONTINUOUS
Status: DISCONTINUED | OUTPATIENT
Start: 2024-03-18 | End: 2024-03-18 | Stop reason: HOSPADM

## 2024-03-18 RX ORDER — LIDOCAINE HYDROCHLORIDE 10 MG/ML
1 INJECTION, SOLUTION EPIDURAL; INFILTRATION; INTRACAUDAL; PERINEURAL
Status: DISCONTINUED | OUTPATIENT
Start: 2024-03-19 | End: 2024-03-18 | Stop reason: HOSPADM

## 2024-03-18 RX ORDER — LIDOCAINE HYDROCHLORIDE 20 MG/ML
INJECTION, SOLUTION EPIDURAL; INFILTRATION; INTRACAUDAL; PERINEURAL PRN
Status: DISCONTINUED | OUTPATIENT
Start: 2024-03-18 | End: 2024-03-18 | Stop reason: SDUPTHER

## 2024-03-18 RX ORDER — SODIUM CHLORIDE 9 MG/ML
INJECTION, SOLUTION INTRAMUSCULAR; INTRAVENOUS; SUBCUTANEOUS PRN
Status: DISCONTINUED | OUTPATIENT
Start: 2024-03-18 | End: 2024-03-18 | Stop reason: ALTCHOICE

## 2024-03-18 RX ORDER — OXYMETAZOLINE HYDROCHLORIDE 0.05 G/100ML
2 SPRAY NASAL ONCE
Status: COMPLETED | OUTPATIENT
Start: 2024-03-18 | End: 2024-03-18

## 2024-03-18 RX ORDER — ROCURONIUM BROMIDE 10 MG/ML
INJECTION, SOLUTION INTRAVENOUS PRN
Status: DISCONTINUED | OUTPATIENT
Start: 2024-03-18 | End: 2024-03-18 | Stop reason: SDUPTHER

## 2024-03-18 RX ORDER — SODIUM CHLORIDE 9 MG/ML
INJECTION, SOLUTION INTRAVENOUS PRN
Status: DISCONTINUED | OUTPATIENT
Start: 2024-03-18 | End: 2024-03-18 | Stop reason: HOSPADM

## 2024-03-18 RX ADMIN — ONDANSETRON 4 MG: 2 INJECTION INTRAMUSCULAR; INTRAVENOUS at 10:46

## 2024-03-18 RX ADMIN — LIDOCAINE HYDROCHLORIDE 60 MG: 20 INJECTION, SOLUTION EPIDURAL; INFILTRATION; INTRACAUDAL; PERINEURAL at 10:36

## 2024-03-18 RX ADMIN — FENTANYL CITRATE 50 MCG: 50 INJECTION INTRAMUSCULAR; INTRAVENOUS at 10:36

## 2024-03-18 RX ADMIN — SODIUM CHLORIDE, POTASSIUM CHLORIDE, SODIUM LACTATE AND CALCIUM CHLORIDE: 600; 310; 30; 20 INJECTION, SOLUTION INTRAVENOUS at 09:29

## 2024-03-18 RX ADMIN — SODIUM CHLORIDE, POTASSIUM CHLORIDE, SODIUM LACTATE AND CALCIUM CHLORIDE: 600; 310; 30; 20 INJECTION, SOLUTION INTRAVENOUS at 10:34

## 2024-03-18 RX ADMIN — FENTANYL CITRATE 50 MCG: 50 INJECTION INTRAMUSCULAR; INTRAVENOUS at 11:01

## 2024-03-18 RX ADMIN — DEXAMETHASONE SODIUM PHOSPHATE 10 MG: 10 INJECTION, SOLUTION INTRAMUSCULAR; INTRAVENOUS at 10:46

## 2024-03-18 RX ADMIN — OXYMETAZOLINE HYDROCHLORIDE 2 SPRAY: 0.05 SPRAY NASAL at 09:16

## 2024-03-18 RX ADMIN — Medication 2000 MG: at 10:48

## 2024-03-18 RX ADMIN — PROPOFOL 150 MG: 10 INJECTION, EMULSION INTRAVENOUS at 10:36

## 2024-03-18 RX ADMIN — ROCURONIUM BROMIDE 30 MG: 10 INJECTION INTRAVENOUS at 10:36

## 2024-03-18 RX ADMIN — SUGAMMADEX 200 MG: 100 INJECTION, SOLUTION INTRAVENOUS at 11:03

## 2024-03-18 ASSESSMENT — PAIN - FUNCTIONAL ASSESSMENT
PAIN_FUNCTIONAL_ASSESSMENT: FACE, LEGS, ACTIVITY, CRY, AND CONSOLABILITY (FLACC)
PAIN_FUNCTIONAL_ASSESSMENT: 0-10
PAIN_FUNCTIONAL_ASSESSMENT: 0-10

## 2024-03-18 ASSESSMENT — PAIN SCALES - GENERAL
PAINLEVEL_OUTOF10: 3
PAINLEVEL_OUTOF10: 3

## 2024-03-18 ASSESSMENT — ENCOUNTER SYMPTOMS: SHORTNESS OF BREATH: 0

## 2024-03-18 ASSESSMENT — PAIN DESCRIPTION - DESCRIPTORS: DESCRIPTORS: SORE

## 2024-03-18 ASSESSMENT — PAIN DESCRIPTION - LOCATION: LOCATION: THROAT

## 2024-03-18 NOTE — DISCHARGE INSTRUCTIONS
D/c home per anesthesia   Follow up with me in 1 week   Change nasal drip pad as needed without occluding nostrils with it   Expect mucousy bloody drainage for a few days             SEPTOPLASTY/TURBINOPLASTY (FESS), POSTOP       Ocean nasal spray - two sprays in each nostril every 1 - 2 hours while awake for six weeks after surgery.     Afrin nasal spray - two sprays two times a day for four days post-op and as need for a nosebleed     Humidifier to bedside     Take antibiotics and pain medications as directed.     May resume normal diet as tolerated.     Change sniffer dressing as needed.     No heavy lifting or straining for one week post-op.     No Aspirin or Motrin for one week post-op.     DO NOT blow your nose heavily for two weeks post-op. (light blowing is OK)     When sneezing keep your mouth open.     Nasal congestion for 1 - 2 weeks is normal.     Bleeding or oozing from the nose should become less every day and should stop completely by post-op day #3.     Mucus production increases for the first week, then the mucus and crusting will decrease over the next four weeks.

## 2024-03-18 NOTE — OP NOTE
Operative Note      Patient: Xander Harrington  YOB: 1965  MRN: [id number]    Date of Procedure: 3/18/2024  Account Number: [id number]      Pre-Op Diagnosis:   1) Septal Deviation with resulting nasal obstruction      2) Bilateral inferior turbinate hypertrophy    Post-Op Diagnosis:   1) Septal Deviation with resulting nasal obstruction      2) Bilateral inferior turbinate hypertrophy    Operation:   1)  Septoplasty with submucous resection      2)  Bilateral submucous resection of inferior turbinates      3)  Bilateral outfracture of inferior turbinates    Surgeon:   Esau Alvarez M.D.    Anesthesia:   General endotracheal,  1% Lidocaine       with 1:100,000 epinephrine    Findings:   The patient was noted to have a significant septal deviation to the right 2+, and posteriorly to the left 2+    Estimated blood loss:  Minimal    Indications:    This patient has a septal deviation with concomitant inferior turbinate hypertrophy resulting in nasal obstruction and persistent nasal symptoms despite medical therapy aimed at relieving this.  The reason for the procedure as well as potential complications was discussed at great length with the patient.  The discussion included but was not limited to bleeding, infection, septal hematoma, no improvement in breathing disorder, persistence of the nasal and sinus complaints, recurrent deviation, cerebrospinal fluid leak, anosmia, and anesthesia related complications were all mentioned.  All questions were answered, and informed consent was then obtained.    Procedure:    After the patient was positively identified in the pre-operative and operative suites, general endotracheal anesthesia was then induced.  The nasal mucosa was decongested with topical Afrin nasal spray and cotton pledgets soaked in Afrin solution.  1% Lidocaine with epinephrine was infiltrated into the nasal mucosa of the septum, floor of the nose, and columella.  Injectable normal saline was injected

## 2024-03-18 NOTE — ANESTHESIA PRE PROCEDURE
Department of Anesthesiology  Preprocedure Note       Name:  Xander Harrington   Age:  58 y.o.  :  1965                                          MRN:  [id number]         Date:  3/18/2024      Surgeon: Surgeon(s):  Esau Alvarez MD    Procedure: Procedure(s):  SEPTOPLASTY  BILATERAL TURBINATE REDUCTION    Medications prior to admission:   Prior to Admission medications    Medication Sig Start Date End Date Taking? Authorizing Provider   montelukast (SINGULAIR) 10 MG tablet TAKE 1 TABLET BY MOUTH EVERY NIGHT 3/13/24   Liz Rosado MD   Multiple Vitamin (DAILY-VITAMIN) TABS Take 1 tablet by mouth daily    ProviderHannah MD   fluticasone (FLONASE) 50 MCG/ACT nasal spray  23   Hannah Anderson MD   pantoprazole (PROTONIX) 20 MG tablet Take 1 tablet by mouth every morning (before breakfast) 24   Liz Rosado MD   cetirizine (ZYRTEC) 10 MG tablet Take 1 tablet by mouth daily 23   Hannah Anderson MD   meloxicam (MOBIC) 15 MG tablet TAKE 1 TABLET BY MOUTH EVERY DAY WITH FOOD  Patient taking differently: Take 1 tablet by mouth daily 23   Liz Rosado MD   levothyroxine (SYNTHROID) 125 MCG tablet TAKE 1 TABLET BY MOUTH EVERY DAY  Patient taking differently: Take 1 tablet by mouth Daily 10/16/23   Liz Rosado MD   Azelastine HCl 137 MCG/SPRAY SOLN SPRAY TWO SPRAYS IN EACH NOSTRIL TWICE DAILY  Patient taking differently: Inhale 2 sprays into the lungs at bedtime 10/14/22   Liz Rosado MD       Current medications:    Current Facility-Administered Medications   Medication Dose Route Frequency Provider Last Rate Last Admin   • [START ON 3/19/2024] lidocaine PF 1 % injection 1 mL  1 mL IntraDERmal Once PRN Travis Gallego DO       • 0.9 % sodium chloride infusion   IntraVENous Continuous Travis Gallego DO       • lactated ringers IV soln infusion   IntraVENous Continuous Travis Gallego  mL/hr at 24 0929 New Bag at 24 0929   • sodium

## 2024-03-18 NOTE — H&P
History and Physical Service   [facility]    HISTORY AND PHYSICAL EXAMINATION            Date of Evaluation: 3/18/2024  Patient name:  Xander Harrington  MRN:   [id number]  YOB: 1965  PCP:    Liz Rosado MD    History Obtained From:     Patient, medical records    History of Present Illness:     This is Xander Harrington a 58 y.o. male who presents today for a SEPTOPLASTY, BILATERAL TURBINATE REDUCTION by Esau Alvarez MD for Deviated nasal septum; Hypertrophy of nasal turbinates; Overdevelopment of*. HE HS HAD PROBLEMS WITH ALLERGIES FOR MANY YEARS.HE HAS BEEN TAKING ALLERGY \"SHOTS \" FOR 2 YEARS WITHOUT MUCH HELP. HE .NOW PRESENTS FOR SURGICAL CORRECTION.     Denies fever, chills, shortness of breath, cough, congestion, wheezing, chest pain, open sores or wounds.  HE DOES NOT TAKE BLOOD THINNERS,HE DOES NOT HAVE DIABETES.  Past Medical History:     Past Medical History:   Diagnosis Date    Allergic rhinitis     Change in bowel function     Chronic fatigue     Colon polyp 2021    COVID-19 02/2021    Pt reports he has had COVID three times. Most recently 1/2024. Never hospitalized    Excessive daytime sleepiness     GERD (gastroesophageal reflux disease)     Graves disease     had radiation treatment    Hypothyroidism     Dr. Garibay    Mixed hyperlipidemia     Screen for colon cancer     Sleep apnea with use of continuous positive airway pressure (CPAP)     The pt normally wears a nasal piece. He is in the process of obtaining a new oral device, he will not be able to use nasal piece after his nasal surgery    Snoring         Past Surgical History:     Past Surgical History:   Procedure Laterality Date    COLONOSCOPY N/A 4/19/2021    COLONOSCOPY POLYPECTOMY performed by Michael Roger MD at [facility] OR    DENTAL SURGERY      inplant         Medications Prior to Admission:     Prior to Admission medications    Medication Sig Start Date End Date Taking? Authorizing Provider   montelukast

## 2024-03-18 NOTE — ANESTHESIA POSTPROCEDURE EVALUATION
Department of Anesthesiology  Postprocedure Note    Patient: Xander Harrington  MRN: [id number]  YOB: 1965  Date of evaluation: 3/18/2024    Procedure Summary       Date: 03/18/24 Room / Location: [address]    Anesthesia Start: 1034 Anesthesia Stop: 1119    Procedures:       SEPTOPLASTY      BILATERAL TURBINATE REDUCTION (Bilateral) Diagnosis:       Deviated nasal septum      Hypertrophy of nasal turbinates      Overdevelopment of nasal bones      Allergic rhinitis due to animal (cat) (dog) hair and dander      (Deviated nasal septum [J34.2])      (Hypertrophy of nasal turbinates [J34.3])      (Overdevelopment of nasal bones [J34.89])      (Allergic rhinitis due to animal (cat) (dog) hair and dander [J30.81])    Surgeons: Esau Alvarez MD Responsible Provider: Bertha Silver MD    Anesthesia Type: General ASA Status: 2            Anesthesia Type: General    Margarita Phase I: Margarita Score: 10    Margarita Phase II: Margarita Score: 10    Anesthesia Post Evaluation    Cardiovascular status: hemodynamically stable    No notable events documented.

## 2024-04-12 ENCOUNTER — OFFICE VISIT (OUTPATIENT)
Dept: FAMILY MEDICINE CLINIC | Age: 59
End: 2024-04-12
Payer: OTHER GOVERNMENT

## 2024-04-12 VITALS
DIASTOLIC BLOOD PRESSURE: 78 MMHG | WEIGHT: 178.4 LBS | BODY MASS INDEX: 27.13 KG/M2 | HEART RATE: 70 BPM | SYSTOLIC BLOOD PRESSURE: 118 MMHG

## 2024-04-12 DIAGNOSIS — L30.0 NUMMULAR ECZEMA: Primary | ICD-10-CM

## 2024-04-12 PROCEDURE — 99213 OFFICE O/P EST LOW 20 MIN: CPT | Performed by: FAMILY MEDICINE

## 2024-04-12 RX ORDER — CLOBETASOL PROPIONATE 0.5 MG/G
CREAM TOPICAL
Qty: 60 G | Refills: 2 | Status: SHIPPED | OUTPATIENT
Start: 2024-04-12

## 2024-04-12 NOTE — PROGRESS NOTES
[facility]  [facility]  [address]    [place] [address]  Dept: [phone]    4/12/2024    CHIEF COMPLAINT    Chief Complaint   Patient presents with    Rash       HPI    Xander Harrington is a 58 y.o. male who presents   Chief Complaint   Patient presents with    Rash   .  Office visit for new onset rough, scaly, itchy patches on his left anterior shin, mid and low back. Has used topical moisturizer. Hx of allergies, seeing allergist, taking zyrtec and singulair. Has multiple allergies.         Vitals:    04/12/24 1051   BP: 118/78   Pulse: 70   Weight: 80.9 kg (178 lb 6.4 oz)       REVIEW OF SYSTEMS    Review of Systems   Constitutional:  Negative for fever.   Skin:  Positive for rash (see hpi).       PAST MEDICAL HISTORY    Past Medical History:   Diagnosis Date    Allergic rhinitis     Change in bowel function     Chronic fatigue     Colon polyp 2021    COVID-19 02/2021    Pt reports he has had COVID three times. Most recently 1/2024. Never hospitalized    Excessive daytime sleepiness     GERD (gastroesophageal reflux disease)     Graves disease     had radiation treatment    Hypothyroidism     Dr. Garibay    Mixed hyperlipidemia     Nummular eczema     Screen for colon cancer     Sleep apnea with use of continuous positive airway pressure (CPAP)     The pt normally wears a nasal piece. He is in the process of obtaining a new oral device, he will not be able to use nasal piece after his nasal surgery    Snoring        FAMILY HISTORY    Family History   Problem Relation Age of Onset    Heart Disease Mother     Pacemaker Mother     Thyroid Disease Mother     Cancer Father     Stroke Father     Lung Cancer Father     Brain Cancer Father     High Blood Pressure Sister     Thyroid Disease Brother     Thyroid Disease Brother        SOCIAL HISTORY    Social History     Socioeconomic History    Marital status:      Spouse name: None    Number of

## 2024-05-26 DIAGNOSIS — M79.672 PAIN IN BOTH FEET: ICD-10-CM

## 2024-05-26 DIAGNOSIS — G89.29 CHRONIC LEFT SHOULDER PAIN: ICD-10-CM

## 2024-05-26 DIAGNOSIS — M25.512 CHRONIC LEFT SHOULDER PAIN: ICD-10-CM

## 2024-05-26 DIAGNOSIS — M25.561 CHRONIC PAIN OF RIGHT KNEE: ICD-10-CM

## 2024-05-26 DIAGNOSIS — M79.671 PAIN IN BOTH FEET: ICD-10-CM

## 2024-05-26 DIAGNOSIS — G89.29 CHRONIC PAIN OF RIGHT KNEE: ICD-10-CM

## 2024-05-28 RX ORDER — MELOXICAM 15 MG/1
TABLET ORAL
Qty: 30 TABLET | Refills: 5 | Status: SHIPPED | OUTPATIENT
Start: 2024-05-28

## 2024-06-28 DIAGNOSIS — R05.3 CHRONIC COUGH: ICD-10-CM

## 2024-06-28 DIAGNOSIS — K21.9 GASTROESOPHAGEAL REFLUX DISEASE WITHOUT ESOPHAGITIS: ICD-10-CM

## 2024-06-28 RX ORDER — PANTOPRAZOLE SODIUM 20 MG/1
20 TABLET, DELAYED RELEASE ORAL
Qty: 30 TABLET | Refills: 5 | Status: SHIPPED | OUTPATIENT
Start: 2024-06-28

## 2024-06-28 NOTE — TELEPHONE ENCOUNTER
Xander Harrington is calling to request a refill on the following medication(s):    Last Visit Date (If Applicable):  4/12/2024    Next Visit Date:    Visit date not found    Medication Request:  Requested Prescriptions     Pending Prescriptions Disp Refills    pantoprazole (PROTONIX) 20 MG tablet [Pharmacy Med Name: Pantoprazole Sodium Oral Tablet Delayed Release 20 MG] 30 tablet 0     Sig: TAKE 1 TABLET BY MOUTH IN THE MORNING BEFORE BREAKFAST

## 2024-08-05 ENCOUNTER — OFFICE VISIT (OUTPATIENT)
Dept: FAMILY MEDICINE CLINIC | Age: 59
End: 2024-08-05
Payer: OTHER GOVERNMENT

## 2024-08-05 VITALS
OXYGEN SATURATION: 97 % | DIASTOLIC BLOOD PRESSURE: 86 MMHG | HEART RATE: 50 BPM | SYSTOLIC BLOOD PRESSURE: 152 MMHG | WEIGHT: 183.4 LBS | BODY MASS INDEX: 27.89 KG/M2

## 2024-08-05 DIAGNOSIS — M79.671 PAIN IN BOTH FEET: ICD-10-CM

## 2024-08-05 DIAGNOSIS — K21.00 GASTROESOPHAGEAL REFLUX DISEASE WITH ESOPHAGITIS WITHOUT HEMORRHAGE: Primary | ICD-10-CM

## 2024-08-05 DIAGNOSIS — M79.672 PAIN IN BOTH FEET: ICD-10-CM

## 2024-08-05 PROCEDURE — 99214 OFFICE O/P EST MOD 30 MIN: CPT | Performed by: FAMILY MEDICINE

## 2024-08-05 RX ORDER — VONOPRAZAN FUMARATE 26.72 MG/1
20 TABLET ORAL DAILY
Qty: 90 TABLET | Refills: 3 | Status: SHIPPED | OUTPATIENT
Start: 2024-08-05

## 2024-08-05 RX ORDER — VONOPRAZAN FUMARATE 26.72 MG/1
20 TABLET ORAL DAILY
Qty: 90 TABLET | Refills: 3 | Status: SHIPPED | OUTPATIENT
Start: 2024-08-05 | End: 2024-08-05 | Stop reason: SDUPTHER

## 2024-08-05 SDOH — ECONOMIC STABILITY: FOOD INSECURITY: WITHIN THE PAST 12 MONTHS, YOU WORRIED THAT YOUR FOOD WOULD RUN OUT BEFORE YOU GOT MONEY TO BUY MORE.: NEVER TRUE

## 2024-08-05 SDOH — ECONOMIC STABILITY: FOOD INSECURITY: WITHIN THE PAST 12 MONTHS, THE FOOD YOU BOUGHT JUST DIDN'T LAST AND YOU DIDN'T HAVE MONEY TO GET MORE.: NEVER TRUE

## 2024-08-05 SDOH — ECONOMIC STABILITY: INCOME INSECURITY: HOW HARD IS IT FOR YOU TO PAY FOR THE VERY BASICS LIKE FOOD, HOUSING, MEDICAL CARE, AND HEATING?: NOT HARD AT ALL

## 2024-08-05 ASSESSMENT — PATIENT HEALTH QUESTIONNAIRE - PHQ9
1. LITTLE INTEREST OR PLEASURE IN DOING THINGS: NOT AT ALL
SUM OF ALL RESPONSES TO PHQ QUESTIONS 1-9: 0
2. FEELING DOWN, DEPRESSED OR HOPELESS: NOT AT ALL
SUM OF ALL RESPONSES TO PHQ9 QUESTIONS 1 & 2: 0
DEPRESSION UNABLE TO ASSESS: FUNCTIONAL CAPACITY MOTIVATION LIMITS ACCURACY
SUM OF ALL RESPONSES TO PHQ QUESTIONS 1-9: 0

## 2024-08-05 NOTE — PROGRESS NOTES
to return to [employer] and get a new pair tennis shoes  He has not ever done plantar fasciitis exercises and he needs to do that  And I sent are really not noticing anything was in his eye area I did not recommend that we do anything for that at this point  Electronically signed by Zuly Castaneda DO on 8/5/2024 at 9:04 AM

## 2024-09-06 ENCOUNTER — OFFICE VISIT (OUTPATIENT)
Dept: GASTROENTEROLOGY | Age: 59
End: 2024-09-06
Payer: OTHER GOVERNMENT

## 2024-09-06 VITALS
BODY MASS INDEX: 27.98 KG/M2 | SYSTOLIC BLOOD PRESSURE: 137 MMHG | WEIGHT: 184 LBS | DIASTOLIC BLOOD PRESSURE: 78 MMHG | TEMPERATURE: 97.9 F

## 2024-09-06 DIAGNOSIS — D12.6 TUBULAR ADENOMA OF COLON: Primary | ICD-10-CM

## 2024-09-06 DIAGNOSIS — K57.30 DIVERTICULOSIS OF COLON: ICD-10-CM

## 2024-09-06 DIAGNOSIS — K21.9 GASTROESOPHAGEAL REFLUX DISEASE, UNSPECIFIED WHETHER ESOPHAGITIS PRESENT: ICD-10-CM

## 2024-09-06 DIAGNOSIS — K58.0 IRRITABLE BOWEL SYNDROME WITH DIARRHEA: ICD-10-CM

## 2024-09-06 DIAGNOSIS — R19.8 CHANGE IN BOWEL FUNCTION: ICD-10-CM

## 2024-09-06 PROCEDURE — 99204 OFFICE O/P NEW MOD 45 MIN: CPT | Performed by: INTERNAL MEDICINE

## 2024-09-06 ASSESSMENT — ENCOUNTER SYMPTOMS
SHORTNESS OF BREATH: 0
TROUBLE SWALLOWING: 0
RECTAL PAIN: 0
DIARRHEA: 1
CONSTIPATION: 0
SORE THROAT: 1
ABDOMINAL DISTENTION: 0
VOMITING: 0
BLOOD IN STOOL: 0
ABDOMINAL PAIN: 0
NAUSEA: 0
CHOKING: 0
ANAL BLEEDING: 0
COLOR CHANGE: 0
COUGH: 0
WHEEZING: 0

## 2024-09-06 NOTE — PROGRESS NOTES
Unknown (2/22/2024)    Received from [facility] ([place], [place]) Safety & Environment     Fear of Current or Ex-Partner: Not on file     Emotionally Abused: Not on file     Physically Abused: Not on file     Sexually Abused: Not on file     Physically or Sexually Abused: Not on file   Housing Stability: Unknown (8/5/2024)    Housing Stability Vital Sign     Unable to Pay for Housing in the Last Year: Not on file     Number of Places Lived in the Last Year: Not on file     Unstable Housing in the Last Year: No         REVIEW OF SYSTEMS:         Review of Systems   Constitutional:  Negative for appetite change and fatigue.   HENT:  Positive for sore throat (irritated). Negative for trouble swallowing.    Eyes:  Negative for visual disturbance.   Respiratory:  Negative for cough, choking, shortness of breath and wheezing.    Cardiovascular:  Negative for chest pain, palpitations and leg swelling.   Gastrointestinal:  Positive for diarrhea (3 BM after waking up- diarrhea/loose stools). Negative for abdominal distention, abdominal pain, anal bleeding, blood in stool, constipation, nausea, rectal pain and vomiting.   Skin:  Negative for color change.   Allergic/Immunologic: Negative for environmental allergies and food allergies.   Neurological:  Negative for dizziness, weakness, numbness and headaches.   Hematological:  Does not bruise/bleed easily.   Psychiatric/Behavioral:  Negative for confusion and sleep disturbance. The patient is not nervous/anxious.        PHYSICAL EXAMINATION: Vital signs reviewed per the nursing documentation.     /78   Temp 97.9 °F (36.6 °C)   Wt 83.5 kg (184 lb)   BMI 27.98 kg/m²   Body mass index is 27.98 kg/m².   Physical Exam  Nursing note reviewed.   Constitutional:       Appearance: He is well-developed.      Comments: Anxious    HENT:      Head: Normocephalic and atraumatic.   Eyes:      Conjunctiva/sclera: Conjunctivae normal.      Pupils: Pupils

## 2024-09-16 ENCOUNTER — OFFICE VISIT (OUTPATIENT)
Dept: FAMILY MEDICINE CLINIC | Age: 59
End: 2024-09-16
Payer: OTHER GOVERNMENT

## 2024-09-16 VITALS
HEART RATE: 56 BPM | SYSTOLIC BLOOD PRESSURE: 122 MMHG | BODY MASS INDEX: 28.11 KG/M2 | DIASTOLIC BLOOD PRESSURE: 68 MMHG | WEIGHT: 184.8 LBS

## 2024-09-16 DIAGNOSIS — M79.671 PAIN IN BOTH FEET: ICD-10-CM

## 2024-09-16 DIAGNOSIS — M79.672 PAIN IN BOTH FEET: ICD-10-CM

## 2024-09-16 DIAGNOSIS — K21.9 GASTROESOPHAGEAL REFLUX DISEASE WITHOUT ESOPHAGITIS: ICD-10-CM

## 2024-09-16 DIAGNOSIS — M25.569 KNEE PAIN, UNSPECIFIED CHRONICITY, UNSPECIFIED LATERALITY: Primary | ICD-10-CM

## 2024-09-16 DIAGNOSIS — J30.89 NON-SEASONAL ALLERGIC RHINITIS, UNSPECIFIED TRIGGER: ICD-10-CM

## 2024-09-16 DIAGNOSIS — E03.9 ACQUIRED HYPOTHYROIDISM: ICD-10-CM

## 2024-09-16 PROCEDURE — 99214 OFFICE O/P EST MOD 30 MIN: CPT | Performed by: FAMILY MEDICINE

## 2024-09-16 ASSESSMENT — ENCOUNTER SYMPTOMS
COUGH: 1
EYES NEGATIVE: 1
CONSTIPATION: 0
DIARRHEA: 0
APNEA: 1
ALLERGIC/IMMUNOLOGIC NEGATIVE: 1
SHORTNESS OF BREATH: 0
ABDOMINAL PAIN: 0
BLOOD IN STOOL: 0

## 2024-10-02 ENCOUNTER — HOSPITAL ENCOUNTER (OUTPATIENT)
Age: 59
Setting detail: OUTPATIENT SURGERY
Discharge: HOME | End: 2024-10-02
Attending: INTERNAL MEDICINE | Admitting: INTERNAL MEDICINE
Payer: OTHER GOVERNMENT

## 2024-10-02 ENCOUNTER — ANESTHESIA (OUTPATIENT)
Dept: OPERATING ROOM | Age: 59
End: 2024-10-02
Payer: OTHER GOVERNMENT

## 2024-10-02 ENCOUNTER — ANESTHESIA EVENT (OUTPATIENT)
Dept: OPERATING ROOM | Age: 59
End: 2024-10-02
Payer: OTHER GOVERNMENT

## 2024-10-02 VITALS
DIASTOLIC BLOOD PRESSURE: 80 MMHG | BODY MASS INDEX: 27.43 KG/M2 | RESPIRATION RATE: 13 BRPM | TEMPERATURE: 96.8 F | HEART RATE: 50 BPM | HEIGHT: 68 IN | OXYGEN SATURATION: 98 % | WEIGHT: 181 LBS | SYSTOLIC BLOOD PRESSURE: 142 MMHG

## 2024-10-02 DIAGNOSIS — K21.9 GASTROESOPHAGEAL REFLUX DISEASE, UNSPECIFIED WHETHER ESOPHAGITIS PRESENT: ICD-10-CM

## 2024-10-02 DIAGNOSIS — Z86.0100 HX OF COLONIC POLYPS: ICD-10-CM

## 2024-10-02 PROCEDURE — 2500000003 HC RX 250 WO HCPCS: Performed by: SPECIALIST

## 2024-10-02 PROCEDURE — 3700000000 HC ANESTHESIA ATTENDED CARE: Performed by: INTERNAL MEDICINE

## 2024-10-02 PROCEDURE — 2709999900 HC NON-CHARGEABLE SUPPLY: Performed by: INTERNAL MEDICINE

## 2024-10-02 PROCEDURE — 3700000001 HC ADD 15 MINUTES (ANESTHESIA): Performed by: INTERNAL MEDICINE

## 2024-10-02 PROCEDURE — 2580000003 HC RX 258: Performed by: ANESTHESIOLOGY

## 2024-10-02 PROCEDURE — 88305 TISSUE EXAM BY PATHOLOGIST: CPT

## 2024-10-02 PROCEDURE — 3609027000 HC COLONOSCOPY: Performed by: INTERNAL MEDICINE

## 2024-10-02 PROCEDURE — 7100000011 HC PHASE II RECOVERY - ADDTL 15 MIN: Performed by: INTERNAL MEDICINE

## 2024-10-02 PROCEDURE — 45378 DIAGNOSTIC COLONOSCOPY: CPT | Performed by: INTERNAL MEDICINE

## 2024-10-02 PROCEDURE — 6360000002 HC RX W HCPCS: Performed by: SPECIALIST

## 2024-10-02 PROCEDURE — 7100000010 HC PHASE II RECOVERY - FIRST 15 MIN: Performed by: INTERNAL MEDICINE

## 2024-10-02 PROCEDURE — 43239 EGD BIOPSY SINGLE/MULTIPLE: CPT | Performed by: INTERNAL MEDICINE

## 2024-10-02 PROCEDURE — 2580000003 HC RX 258: Performed by: SPECIALIST

## 2024-10-02 PROCEDURE — 3609017100 HC EGD: Performed by: INTERNAL MEDICINE

## 2024-10-02 RX ORDER — SODIUM CHLORIDE 0.9 % (FLUSH) 0.9 %
5-40 SYRINGE (ML) INJECTION EVERY 12 HOURS SCHEDULED
Status: DISCONTINUED | OUTPATIENT
Start: 2024-10-02 | End: 2024-10-02 | Stop reason: HOSPADM

## 2024-10-02 RX ORDER — SODIUM CHLORIDE 0.9 % (FLUSH) 0.9 %
5-40 SYRINGE (ML) INJECTION PRN
Status: DISCONTINUED | OUTPATIENT
Start: 2024-10-02 | End: 2024-10-02 | Stop reason: HOSPADM

## 2024-10-02 RX ORDER — LIDOCAINE HYDROCHLORIDE 10 MG/ML
1 INJECTION, SOLUTION EPIDURAL; INFILTRATION; INTRACAUDAL; PERINEURAL
Status: DISCONTINUED | OUTPATIENT
Start: 2024-10-02 | End: 2024-10-02 | Stop reason: HOSPADM

## 2024-10-02 RX ORDER — PROPOFOL 10 MG/ML
INJECTION, EMULSION INTRAVENOUS
Status: DISCONTINUED | OUTPATIENT
Start: 2024-10-02 | End: 2024-10-02 | Stop reason: SDUPTHER

## 2024-10-02 RX ORDER — SODIUM CHLORIDE 9 MG/ML
INJECTION, SOLUTION INTRAVENOUS CONTINUOUS
Status: DISCONTINUED | OUTPATIENT
Start: 2024-10-02 | End: 2024-10-02 | Stop reason: HOSPADM

## 2024-10-02 RX ORDER — GLYCOPYRROLATE 0.2 MG/ML
INJECTION INTRAMUSCULAR; INTRAVENOUS
Status: DISCONTINUED | OUTPATIENT
Start: 2024-10-02 | End: 2024-10-02 | Stop reason: SDUPTHER

## 2024-10-02 RX ORDER — SODIUM CHLORIDE, SODIUM LACTATE, POTASSIUM CHLORIDE, CALCIUM CHLORIDE 600; 310; 30; 20 MG/100ML; MG/100ML; MG/100ML; MG/100ML
INJECTION, SOLUTION INTRAVENOUS
Status: DISCONTINUED | OUTPATIENT
Start: 2024-10-02 | End: 2024-10-02 | Stop reason: SDUPTHER

## 2024-10-02 RX ORDER — LIDOCAINE HYDROCHLORIDE 10 MG/ML
INJECTION, SOLUTION EPIDURAL; INFILTRATION; INTRACAUDAL; PERINEURAL
Status: DISCONTINUED | OUTPATIENT
Start: 2024-10-02 | End: 2024-10-02 | Stop reason: SDUPTHER

## 2024-10-02 RX ORDER — SODIUM CHLORIDE 9 MG/ML
INJECTION, SOLUTION INTRAVENOUS PRN
Status: DISCONTINUED | OUTPATIENT
Start: 2024-10-02 | End: 2024-10-02 | Stop reason: HOSPADM

## 2024-10-02 RX ORDER — SODIUM CHLORIDE, SODIUM LACTATE, POTASSIUM CHLORIDE, CALCIUM CHLORIDE 600; 310; 30; 20 MG/100ML; MG/100ML; MG/100ML; MG/100ML
INJECTION, SOLUTION INTRAVENOUS CONTINUOUS
Status: DISCONTINUED | OUTPATIENT
Start: 2024-10-02 | End: 2024-10-02 | Stop reason: HOSPADM

## 2024-10-02 RX ADMIN — PROPOFOL 100 MG: 10 INJECTION, EMULSION INTRAVENOUS at 09:11

## 2024-10-02 RX ADMIN — SODIUM CHLORIDE, POTASSIUM CHLORIDE, SODIUM LACTATE AND CALCIUM CHLORIDE: 600; 310; 30; 20 INJECTION, SOLUTION INTRAVENOUS at 08:36

## 2024-10-02 RX ADMIN — PROPOFOL 100 MCG/KG/MIN: 10 INJECTION, EMULSION INTRAVENOUS at 09:12

## 2024-10-02 RX ADMIN — GLYCOPYRROLATE 0.4 MG: 0.2 INJECTION INTRAMUSCULAR; INTRAVENOUS at 09:32

## 2024-10-02 RX ADMIN — SODIUM CHLORIDE, POTASSIUM CHLORIDE, SODIUM LACTATE AND CALCIUM CHLORIDE: 600; 310; 30; 20 INJECTION, SOLUTION INTRAVENOUS at 08:37

## 2024-10-02 RX ADMIN — LIDOCAINE HYDROCHLORIDE 50 MG: 10 SOLUTION INTRAVENOUS at 09:12

## 2024-10-02 ASSESSMENT — PAIN - FUNCTIONAL ASSESSMENT
PAIN_FUNCTIONAL_ASSESSMENT: 0-10
PAIN_FUNCTIONAL_ASSESSMENT: FACE, LEGS, ACTIVITY, CRY, AND CONSOLABILITY (FLACC)

## 2024-10-02 NOTE — OP NOTE
PROCEDURE NOTE    DATE OF PROCEDURE: 10/2/2024    SURGEON: Michael Roger MD    ASSISTANT: None    PREOPERATIVE DIAGNOSIS: SCREENING  HX OF COLON POLYPS     POSTOPERATIVE DIAGNOSIS: as described below    OPERATION: Total colonoscopy     ANESTHESIA: MAC PER ANESTHESIA     ESTIMATED BLOOD LOSS: less than 50     COMPLICATIONS: None.     SPECIMENS:  Was Not Obtained    HISTORY: The patient is a 59 y.o. year old male with history of above preop diagnosis.  I recommended colonoscopy with possible biopsy or polypectomy and I explained the risk, benefits, expected outcome, and alternatives to the procedure.  Risks included but are not limited to bleeding, infection, respiratory distress, hypotension, and perforation of the colon and possibility of missing a lesion.  The patient understands and is in agreement.      PROCEDURE: The patient was given IV conscious sedation.  The patient's SPO2 remained above 90% throughout the procedure. The colonoscope was inserted per rectum and advanced under direct vision to the cecum without difficulty.  The prep was fair TO GOOD  CHUNKY PASTY STOOLS    Findings:  Terminal ileum: normal    Cecum/Ascending colon: normal    Transverse colon: normal    Descending/Sigmoid colon: abnormal: SCATTERED DIVERTICULI    Rectum/Anus: examined in normal and retroflexed positions and was abnormal: PROMINENT INT HEMORRHOIDS    Withdrawal Time was (minutes): 9    The colon was decompressed and the scope was removed.  The patient tolerated the procedure well.     Recommendations/Plan:   Lifestyle and dietary modifications as discussed  F/U In Office in 3-4 weeks  Discussed with the family  Colonoscopy Recall :2 yearS WITH BETTER PREP   POST SEDATION PATIENT WAS STABLE WITH STABLE VITAL SIGNS AND OXYGEN SATURATIONS AND WAS DISCHARGED HOME WITH RIDE IN A STABLE CONDITION.    Electronically signed by Michael Roger MD  on 10/2/2024 at 9:22 AM

## 2024-10-02 NOTE — OP NOTE
PROCEDURE NOTE    DATE OF PROCEDURE: 10/2/2024     SURGEON: Michael Roger MD    ASSISTANT: None    PREOPERATIVE DIAGNOSIS: GERD  ABDOMINAL PAINS    POSTOPERATIVE DIAGNOSIS: As described below    OPERATION: Upper GI endoscopy with Biopsy    ANESTHESIA: MAC PER ANESTHESIA     ESTIMATED BLOOD LOSS: Less than 50 ml    COMPLICATIONS: None.     SPECIMENS:  Was Obtained:     HISTORY: The patient is a 59 y.o. year old male with history of above preop diagnosis.  I recommended esophagogastroduodenoscopy with possible biopsy and I explained the risk, benefits, expected outcome, and alternatives to the procedure.  Risks included but are not limited to bleeding, infection, respiratory distress, hypotension, and perforation of the esophagus, stomach, or duodenum.  Patient understands and is in agreement.    PROCEDURE: The patient was given IV conscious sedation.  The patient's SPO2 remained above 90% throughout the procedure. The gastroscope was inserted orally and advanced under direct vision through the esophagus, through the stomach, through the pylorus, and into the descending duodenum.      Findings:    Retropharyngeal area was grossly normal appearing    Esophagus: abnormal: MILD IRREGULAR SCJ WAS NOTED  NON OBSTRUCTING SCHATZKI'S RING WAS NOTED  ONE CM HIATAL HERNIA  MULTIPLE BIOPSIES AND PICTURES WERE TAKEN     Stomach:    Fundus: normal    Body: normal    Antrum: abnormal: ANTRAL GASTRITIS WAS BIOPSIED     Duodenum:     Descending: normal    Bulb: normal    The scope was removed and the patient tolerated the procedure well.     Recommendations/Plan:   F/U Biopsies  F/U In Office in 3-4 weeks  Discussed with the family  Post sedation patient was stable with stable vital signs and stable O2 saturations    Electronically signed by Michael Roger MD  on 10/2/2024 at 9:20 AM

## 2024-10-02 NOTE — ANESTHESIA PRE PROCEDURE
ringers IV soln infusion   IntraVENous Continuous Chandler Arreguin  mL/hr at 10/02/24 0837 New Bag at 10/02/24 0837   • sodium chloride flush 0.9 % injection 5-40 mL  5-40 mL IntraVENous 2 times per day Chandler Arreguin MD       • sodium chloride flush 0.9 % injection 5-40 mL  5-40 mL IntraVENous PRN Chandler Arreguin MD       • 0.9 % sodium chloride infusion   IntraVENous PRN Chandler Arreguin MD         Facility-Administered Medications Ordered in Other Encounters   Medication Dose Route Frequency Provider Last Rate Last Admin   • lactated ringers IV soln infusion   IntraVENous Continuous PRN Kristi Cao APRN - CRNA   New Bag at 10/02/24 0836       Allergies:    Allergies   Allergen Reactions   • Codeine      Headache     • Requip [Ropinirole Hcl] Nausea Only       Problem List:    Patient Active Problem List   Diagnosis Code   • Hypothyroidism E03.9   • Regular astigmatism H52.229   • Allergic rhinitis J30.9   • Change in bowel function R19.8   • Irritable bowel syndrome with diarrhea K58.0   • Liquid stool R19.7   • COVID-19 U07.1   • Diverticulosis of colon K57.30   • Tubular adenoma of colon D12.6   • Nummular eczema L30.0   • Gastroesophageal reflux disease K21.9       Past Medical History:        Diagnosis Date   • Allergic rhinitis    • Change in bowel function    • Chronic fatigue    • Colon polyp 2021   • COVID-19 02/2021    Pt reports he has had COVID three times. Most recently 1/2024. Never hospitalized   • Excessive daytime sleepiness    • GERD (gastroesophageal reflux disease)    • Graves disease     had radiation treatment   • Hypothyroidism     Dr. Garibay   • Mixed hyperlipidemia    • Nummular eczema    • Screen for colon cancer    • Sleep apnea with use of continuous positive airway pressure (CPAP)     The pt normally wears a nasal piece. He is in the process of obtaining a new oral device, he will not be able to use nasal piece after his nasal

## 2024-10-02 NOTE — ANESTHESIA POSTPROCEDURE EVALUATION
Department of Anesthesiology  Postprocedure Note    Patient: Xander Harrington  MRN: [id number]  YOB: 1965  Date of evaluation: 10/2/2024    Procedure Summary       Date: 10/02/24 Room / Location: [address]    Anesthesia Start: 0908 Anesthesia Stop: 0949    Procedures:       COLORECTAL CANCER SCREENING, NOT HIGH RISK (Rectum)      ESOPHAGOGASTRODUODENOSCOPY FORCEPS BIOPSY (Esophagus) Diagnosis:       Hx of colonic polyps      Gastroesophageal reflux disease, unspecified whether esophagitis present      (Hx of colonic polyps [Z86.010])      (Gastroesophageal reflux disease, unspecified whether esophagitis present [K21.9])    Surgeons: Michael Roger MD Responsible Provider: Travis Gallego DO    Anesthesia Type: general, TIVA ASA Status: 3            Anesthesia Type: No value filed.    Margarita Phase I:      Margarita Phase II: Margarita Score: 8    Anesthesia Post Evaluation    Patient location during evaluation: PACU  Patient participation: complete - patient participated  Level of consciousness: awake and alert  Airway patency: patent  Nausea & Vomiting: no nausea and no vomiting  Cardiovascular status: hemodynamically stable  Respiratory status: acceptable  Hydration status: stable  Pain management: adequate    No notable events documented.

## 2024-10-02 NOTE — H&P
Year: No         REVIEW OF SYSTEMS:         Review of Systems   Constitutional:  Negative for appetite change and fatigue.   HENT:  Positive for sore throat (irritated). Negative for trouble swallowing.    Eyes:  Negative for visual disturbance.   Respiratory:  Negative for cough, choking, shortness of breath and wheezing.    Cardiovascular:  Negative for chest pain, palpitations and leg swelling.   Gastrointestinal:  Positive for diarrhea (3 BM after waking up- diarrhea/loose stools). Negative for abdominal distention, abdominal pain, anal bleeding, blood in stool, constipation, nausea, rectal pain and vomiting.   Skin:  Negative for color change.   Allergic/Immunologic: Negative for environmental allergies and food allergies.   Neurological:  Negative for dizziness, weakness, numbness and headaches.   Hematological:  Does not bruise/bleed easily.   Psychiatric/Behavioral:  Negative for confusion and sleep disturbance. The patient is not nervous/anxious.        PHYSICAL EXAMINATION: Vital signs reviewed per the nursing documentation.     /78   Temp 97.9 °F (36.6 °C)   Wt 83.5 kg (184 lb)   BMI 27.98 kg/m²   Body mass index is 27.98 kg/m².   Physical Exam  Nursing note reviewed.   Constitutional:       Appearance: He is well-developed.      Comments: Anxious    HENT:      Head: Normocephalic and atraumatic.   Eyes:      Conjunctiva/sclera: Conjunctivae normal.      Pupils: Pupils are equal, round, and reactive to light.   Cardiovascular:      Rate and Rhythm: Normal rate and regular rhythm.   Pulmonary:      Effort: Pulmonary effort is normal.      Breath sounds: Normal breath sounds.   Abdominal:      General: Bowel sounds are normal.      Palpations: Abdomen is soft.      Comments: NON TENDER, NON DISTENTED  LIVER SPLEEN AND HERNIAS ARE NOT  PALPABLE  BOWEL SOUNDS ARE POSITIVE      Genitourinary:     Rectum: Normal.   Musculoskeletal:         General: Normal range of motion.      Cervical back: Normal

## 2024-10-02 NOTE — DISCHARGE INSTRUCTIONS
Colonoscopy: What to Expect at Home  Your Recovery  Your doctor will talk to you about when you will need your next colonoscopy. The results of your test and your risk for colorectal cancer will help your doctor decide how often you need to be checked.  After the test, you may be bloated or have gas pains. You may need to pass gas. If a biopsy was done or a polyp was removed, you may have streaks of blood in your stool (feces) for a few days.    How can you care for yourself at home?  Activity  Rest as much as you need to after you go home.  You should be able to go back to your usual activities the day after the test.  Diet  Follow your doctor’s directions for eating.  Drink plenty of fluids (unless your doctor has told you not to) to replace the fluids that were lost during the colon prep.  Medicines  If polyps were removed or a biopsy was done during the test, your doctor may tell you not to take aspirin or other anti-inflammatory medicines, such as ibuprofen (Advil, Motrin) and naproxen (Aleve), for a few days.  Follow-up care is a key part of your treatment and safety. Be sure to make and go to all appointments, and call your doctor if you are having problems.  When should you call for help?  Call 911 anytime you think you may need emergency care. For example, call if:  You passed out (lost consciousness).  You pass maroon or bloody stools.  You have severe belly pain.  Call your doctor now or seek immediate medical care if:  Your stools are black and tarlike.  Your stools have streaks of blood, but you did not have a biopsy or any polyps removed.  You have belly pain, or your belly is swollen and firm.  You vomit.  You have a fever.  You are very dizzy.  Watch closely for changes in your health, and be sure to contact your doctor if you have any problems.   Where can you learn more?   Go to [email or web address] and sign in to your [employer] account. Enter E264 in the Search [employer]  6155-5330 Healthwise, Pulse 8. Care instructions adapted under license by [facility]. This care instruction is for use with your licensed healthcare professional. If you have questions about a medical condition or this instruction, always ask your healthcare professional. [email or web address] disclaims any warranty or liability for your use of this information.  Content Version: 9.9.219846; Last Revised: February 20, 2013

## 2024-10-03 LAB — SURGICAL PATHOLOGY REPORT: NORMAL

## 2024-10-14 DIAGNOSIS — D12.6 TUBULAR ADENOMA OF COLON: ICD-10-CM

## 2024-10-14 DIAGNOSIS — K21.9 GASTROESOPHAGEAL REFLUX DISEASE, UNSPECIFIED WHETHER ESOPHAGITIS PRESENT: ICD-10-CM

## 2024-10-14 DIAGNOSIS — K57.30 DIVERTICULOSIS OF COLON: ICD-10-CM

## 2024-10-14 DIAGNOSIS — K58.0 IRRITABLE BOWEL SYNDROME WITH DIARRHEA: ICD-10-CM

## 2024-10-14 DIAGNOSIS — R19.8 CHANGE IN BOWEL FUNCTION: ICD-10-CM

## 2024-12-03 ENCOUNTER — OFFICE VISIT (OUTPATIENT)
Dept: PODIATRY | Age: 59
End: 2024-12-03
Payer: OTHER GOVERNMENT

## 2024-12-03 VITALS — HEIGHT: 68 IN | BODY MASS INDEX: 27.43 KG/M2 | WEIGHT: 181 LBS

## 2024-12-03 DIAGNOSIS — M72.2 PLANTAR FASCIITIS: ICD-10-CM

## 2024-12-03 DIAGNOSIS — M72.2 PLANTAR FASCIAL FIBROMATOSIS: Primary | ICD-10-CM

## 2024-12-03 PROCEDURE — 99213 OFFICE O/P EST LOW 20 MIN: CPT | Performed by: PODIATRIST

## 2024-12-03 RX ORDER — FLUTICASONE PROPIONATE 50 MCG
SPRAY, SUSPENSION (ML) NASAL
COMMUNITY
Start: 2024-03-25

## 2024-12-03 NOTE — PROGRESS NOTES
[facility], [facility] PODIATRY  [address] [place], [place] [address]  Dept: [phone]    RETURN PATIENT PROGRESS NOTE  Date of patient's visit: 12/3/2024  Patient's Name:  Xander Harrington YOB: 1965            Patient Care Team:  Liz Rosado MD as PCP - General (Family Medicine)  [facility]  Michael Roger MD as Consulting Physician (Gastroenterology)  Lorena Norris DPM as Physician (Podiatry)       Xander Harrington 59 y.o. male that presents for follow-up of   Chief Complaint   Patient presents with    Foot Pain     Bl feet right more severe  Has orthotics from last year     Pt's primary care physician is Liz Rosado MD last seen September 16 2024  Symptoms began several month(s) ago and are increased .  Patient relates pain is Present to plantar arch and heel.  Pain is rated 3 out of 10 and is described as intermittent.  Treatments prior to today's visit include: custom orthotics.  Currently denies F/C/N/V.     Allergies   Allergen Reactions    Codeine      Headache      Requip [Ropinirole Hcl] Nausea Only    Ropinirole        Past Medical History:   Diagnosis Date    Allergic rhinitis     Change in bowel function     Chronic fatigue     Colon polyp 2021    COVID-19 02/2021    Pt reports he has had COVID three times. Most recently 1/2024. Never hospitalized    Excessive daytime sleepiness     GERD (gastroesophageal reflux disease)     Graves disease     had radiation treatment    Hypothyroidism     Dr. Garibay    Mixed hyperlipidemia     Nummular eczema     Screen for colon cancer     Sleep apnea with use of continuous positive airway pressure (CPAP)     The pt normally wears a nasal piece. He is in the process of obtaining a new oral device, he will not be able to use nasal piece after his nasal surgery    Snoring        Prior to Admission medications    Medication Sig Start Date End Date Taking?

## 2024-12-13 ENCOUNTER — OFFICE VISIT (OUTPATIENT)
Dept: PULMONOLOGY | Age: 59
End: 2024-12-13
Payer: OTHER GOVERNMENT

## 2024-12-13 VITALS
DIASTOLIC BLOOD PRESSURE: 79 MMHG | TEMPERATURE: 97.5 F | RESPIRATION RATE: 16 BRPM | BODY MASS INDEX: 28.34 KG/M2 | HEART RATE: 51 BPM | WEIGHT: 187 LBS | SYSTOLIC BLOOD PRESSURE: 131 MMHG | HEIGHT: 68 IN | OXYGEN SATURATION: 97 %

## 2024-12-13 DIAGNOSIS — J30.2 SEASONAL ALLERGIES: ICD-10-CM

## 2024-12-13 DIAGNOSIS — E03.9 HYPOTHYROIDISM, UNSPECIFIED TYPE: ICD-10-CM

## 2024-12-13 DIAGNOSIS — G47.33 OSA (OBSTRUCTIVE SLEEP APNEA): Primary | ICD-10-CM

## 2024-12-13 PROCEDURE — 99213 OFFICE O/P EST LOW 20 MIN: CPT | Performed by: STUDENT IN AN ORGANIZED HEALTH CARE EDUCATION/TRAINING PROGRAM

## 2024-12-13 ASSESSMENT — SLEEP AND FATIGUE QUESTIONNAIRES
HOW LIKELY ARE YOU TO NOD OFF OR FALL ASLEEP WHILE SITTING AND TALKING TO SOMEONE: WOULD NEVER DOZE
HOW LIKELY ARE YOU TO NOD OFF OR FALL ASLEEP WHILE WATCHING TV: WOULD NEVER DOZE
HOW LIKELY ARE YOU TO NOD OFF OR FALL ASLEEP WHILE LYING DOWN TO REST IN THE AFTERNOON WHEN CIRCUMSTANCES PERMIT: HIGH CHANCE OF DOZING
ESS TOTAL SCORE: 3
HOW LIKELY ARE YOU TO NOD OFF OR FALL ASLEEP WHILE SITTING AND READING: WOULD NEVER DOZE
HOW LIKELY ARE YOU TO NOD OFF OR FALL ASLEEP IN A CAR, WHILE STOPPED FOR A FEW MINUTES IN TRAFFIC: WOULD NEVER DOZE
HOW LIKELY ARE YOU TO NOD OFF OR FALL ASLEEP WHILE SITTING QUIETLY AFTER LUNCH WITHOUT ALCOHOL: WOULD NEVER DOZE
HOW LIKELY ARE YOU TO NOD OFF OR FALL ASLEEP WHEN YOU ARE A PASSENGER IN A CAR FOR AN HOUR WITHOUT A BREAK: WOULD NEVER DOZE
HOW LIKELY ARE YOU TO NOD OFF OR FALL ASLEEP WHILE SITTING INACTIVE IN A PUBLIC PLACE: WOULD NEVER DOZE

## 2024-12-13 NOTE — PATIENT INSTRUCTIONS
Once OV note is signed [facility] will fax order to [facility].  12/13/24mm: DME order and OV note were faxed to [facility].

## 2024-12-13 NOTE — PROGRESS NOTES
[facility]  Office visit follow-up  ______________________________________________________________________________    Patient: Xander Harrington  YOB: 1965   MRN: [id number]    Acct:      Today's date: 12/13/24    Chief complaint   Follow up     History of present illness     A 59 y.o. male scented today to the pulmonary/sleep clinic for obstructive sleep apnea follow-up.  The patient was last time seen by Dr. Villegas November 2023.  The patient reported that he had a new machine about 6 months ago.  He was receiving a message on the machine saying that the heat system is fault and it was working without hitting machine but in the last few weeks it was not given him this message.  Otherwise the patient denies any symptoms including nighttime sleep problem, daytime symptom including headache and feeling sleepy and tired.  The compliance report from  8/29/2024 to 11/26/2024 showed the patient uses the machine about 6 hours and 50 minutes, he is 100% compliant, he is currently on CPAP with auto adjust minimal pressure 4 cm H2O and maximum pressure 16 cm H2O, average pressure about 12.8 cm H2O, leak about 3.9 L/min, events per hour 4.2.  The sleep apnea is very well-controlled and no need for any adjustment of the pressures        ROS:     Constitutional:no fever, no chills  HEENT: no runny nose, no sore throat  Respiratory: No shortness of breath, no wheezing, no cough.    CVS: no chest pain, no palpitations, no syncope  Gi: no abdominal pain, no nausea, no vomiting, no diarrhea.  MSK: no myalgia, no joint pain.  Skin: no rash  Neurological: no weakness    Severity: MRCA grading:   [] Grade I Not troubled by breathlessness except on strenuous exercise   [x] Grade II Short of breath when hurrying on a level or when walking up a slight hill   [] Grade III Walks slower than most people on the level, stops after a mile (1.6 km or so, or stops after 15 minutes walking at own

## 2024-12-22 DIAGNOSIS — R05.3 CHRONIC COUGH: ICD-10-CM

## 2024-12-22 DIAGNOSIS — K21.9 GASTROESOPHAGEAL REFLUX DISEASE WITHOUT ESOPHAGITIS: ICD-10-CM

## 2024-12-23 RX ORDER — PANTOPRAZOLE SODIUM 20 MG/1
20 TABLET, DELAYED RELEASE ORAL
Qty: 30 TABLET | Refills: 5 | Status: SHIPPED | OUTPATIENT
Start: 2024-12-23

## 2024-12-23 NOTE — TELEPHONE ENCOUNTER
Xander Harrington is calling to request a refill on the following medication(s):    Last Visit Date (If Applicable):  9/16/2024    Next Visit Date:    Visit date not found    Medication Request:  Requested Prescriptions     Pending Prescriptions Disp Refills    pantoprazole (PROTONIX) 20 MG tablet [Pharmacy Med Name: Pantoprazole Sodium Oral Tablet Delayed Release 20 MG] 30 tablet 0     Sig: TAKE 1 TABLET BY MOUTH IN THE MORNING BEFORE BREAKFAST

## 2025-02-20 ENCOUNTER — OFFICE VISIT (OUTPATIENT)
Dept: FAMILY MEDICINE CLINIC | Age: 60
End: 2025-02-20

## 2025-02-20 VITALS
OXYGEN SATURATION: 96 % | DIASTOLIC BLOOD PRESSURE: 81 MMHG | HEIGHT: 68 IN | BODY MASS INDEX: 27.98 KG/M2 | WEIGHT: 184.6 LBS | HEART RATE: 57 BPM | SYSTOLIC BLOOD PRESSURE: 153 MMHG

## 2025-02-20 DIAGNOSIS — M79.672 PAIN IN BOTH FEET: Primary | ICD-10-CM

## 2025-02-20 DIAGNOSIS — M79.671 PAIN IN BOTH FEET: Primary | ICD-10-CM

## 2025-02-20 DIAGNOSIS — M62.830 LUMBAR PARASPINAL MUSCLE SPASM: ICD-10-CM

## 2025-02-20 DIAGNOSIS — E34.9 HYPOTESTOSTERONISM: ICD-10-CM

## 2025-02-20 DIAGNOSIS — J30.89 NON-SEASONAL ALLERGIC RHINITIS, UNSPECIFIED TRIGGER: ICD-10-CM

## 2025-02-20 DIAGNOSIS — K58.0 IRRITABLE BOWEL SYNDROME WITH DIARRHEA: ICD-10-CM

## 2025-02-20 DIAGNOSIS — E03.9 ACQUIRED HYPOTHYROIDISM: ICD-10-CM

## 2025-02-20 DIAGNOSIS — Z00.00 WELL ADULT EXAM: ICD-10-CM

## 2025-02-20 DIAGNOSIS — K21.9 GASTROESOPHAGEAL REFLUX DISEASE WITHOUT ESOPHAGITIS: ICD-10-CM

## 2025-02-20 DIAGNOSIS — K21.00 GASTROESOPHAGEAL REFLUX DISEASE WITH ESOPHAGITIS WITHOUT HEMORRHAGE: ICD-10-CM

## 2025-02-20 RX ORDER — CYCLOBENZAPRINE HCL 10 MG
10 TABLET ORAL NIGHTLY
Qty: 30 TABLET | Refills: 0 | Status: SHIPPED | OUTPATIENT
Start: 2025-02-20 | End: 2025-03-22

## 2025-02-20 SDOH — ECONOMIC STABILITY: FOOD INSECURITY: WITHIN THE PAST 12 MONTHS, THE FOOD YOU BOUGHT JUST DIDN'T LAST AND YOU DIDN'T HAVE MONEY TO GET MORE.: NEVER TRUE

## 2025-02-20 SDOH — ECONOMIC STABILITY: FOOD INSECURITY: WITHIN THE PAST 12 MONTHS, YOU WORRIED THAT YOUR FOOD WOULD RUN OUT BEFORE YOU GOT MONEY TO BUY MORE.: NEVER TRUE

## 2025-02-20 ASSESSMENT — PATIENT HEALTH QUESTIONNAIRE - PHQ9
SUM OF ALL RESPONSES TO PHQ QUESTIONS 1-9: 0
1. LITTLE INTEREST OR PLEASURE IN DOING THINGS: NOT AT ALL
SUM OF ALL RESPONSES TO PHQ QUESTIONS 1-9: 0
2. FEELING DOWN, DEPRESSED OR HOPELESS: NOT AT ALL
SUM OF ALL RESPONSES TO PHQ9 QUESTIONS 1 & 2: 0

## 2025-02-20 NOTE — PROGRESS NOTES
[facility]  [facility]  [address]    [facility] [address]  Dept: [phone]      Xander Harrington is a 59 y.o. male who presents today for follow up on his  medical conditions as noted below.      Chief Complaint   Patient presents with    Back Pain    Foot Pain    Diarrhea       Patient Active Problem List:     Hypothyroidism     Regular astigmatism     Allergic rhinitis     Change in bowel function     Irritable bowel syndrome with diarrhea     Liquid stool     COVID-19     Diverticulosis of colon     Tubular adenoma of colon     Nummular eczema     Gastroesophageal reflux disease     Hx of colonic polyps     Past Medical History:   Diagnosis Date    Allergic rhinitis     Change in bowel function     Chronic fatigue     Colon polyp 2021    COVID-19 02/2021    Pt reports he has had COVID three times. Most recently 1/2024. Never hospitalized    Excessive daytime sleepiness     GERD (gastroesophageal reflux disease)     Graves disease     had radiation treatment    Hypothyroidism     Dr. Garibay    Mixed hyperlipidemia     Nummular eczema     Screen for colon cancer     Sleep apnea with use of continuous positive airway pressure (CPAP)     The pt normally wears a nasal piece. He is in the process of obtaining a new oral device, he will not be able to use nasal piece after his nasal surgery    Snoring       Past Surgical History:   Procedure Laterality Date    COLONOSCOPY N/A 4/19/2021    COLONOSCOPY POLYPECTOMY performed by Michael Roger MD at [facility] OR    COLONOSCOPY N/A 10/2/2024    COLORECTAL CANCER SCREENING, NOT HIGH RISK performed by Michael Roger MD at [facility] OR    DENTAL SURGERY      inplant     NASAL SURG PROC UNLISTED N/A 3/18/2024    SEPTOPLASTY performed by Esau Alvarez MD at [facility] OR    NOSE SURGERY Bilateral 3/18/2024    BILATERAL TURBINATE REDUCTION performed by Esau Alvarez MD at [facility] OR    UPPER GASTROINTESTINAL ENDOSCOPY N/A 10/2/2024

## 2025-02-24 ENCOUNTER — HOSPITAL ENCOUNTER (OUTPATIENT)
Age: 60
Setting detail: SPECIMEN
Discharge: HOME | End: 2025-02-24

## 2025-02-24 ENCOUNTER — NURSE ONLY (OUTPATIENT)
Dept: FAMILY MEDICINE CLINIC | Age: 60
End: 2025-02-24

## 2025-02-24 VITALS
TEMPERATURE: 97.6 F | HEIGHT: 68 IN | BODY MASS INDEX: 28.19 KG/M2 | SYSTOLIC BLOOD PRESSURE: 142 MMHG | WEIGHT: 186 LBS | RESPIRATION RATE: 17 BRPM | OXYGEN SATURATION: 98 % | HEART RATE: 64 BPM | DIASTOLIC BLOOD PRESSURE: 82 MMHG

## 2025-02-24 DIAGNOSIS — E34.9 HYPOTESTOSTERONISM: ICD-10-CM

## 2025-02-24 DIAGNOSIS — J30.89 NON-SEASONAL ALLERGIC RHINITIS, UNSPECIFIED TRIGGER: ICD-10-CM

## 2025-02-24 DIAGNOSIS — K21.00 GASTROESOPHAGEAL REFLUX DISEASE WITH ESOPHAGITIS WITHOUT HEMORRHAGE: ICD-10-CM

## 2025-02-24 DIAGNOSIS — K58.0 IRRITABLE BOWEL SYNDROME WITH DIARRHEA: ICD-10-CM

## 2025-02-24 DIAGNOSIS — E03.9 ACQUIRED HYPOTHYROIDISM: ICD-10-CM

## 2025-02-24 DIAGNOSIS — K21.9 GASTROESOPHAGEAL REFLUX DISEASE WITHOUT ESOPHAGITIS: ICD-10-CM

## 2025-02-24 DIAGNOSIS — R03.0 ELEVATED BLOOD PRESSURE READING: Primary | ICD-10-CM

## 2025-02-24 DIAGNOSIS — Z00.00 WELL ADULT EXAM: ICD-10-CM

## 2025-02-24 LAB
ALBUMIN SERPL-MCNC: 4.2 G/DL (ref 3.5–5.2)
ALBUMIN/GLOB SERPL: 1.5 {RATIO} (ref 1–2.5)
ALP SERPL-CCNC: 61 U/L (ref 40–129)
ALT SERPL-CCNC: 45 U/L (ref 10–50)
ANION GAP SERPL CALCULATED.3IONS-SCNC: 10 MMOL/L (ref 9–16)
AST SERPL-CCNC: 32 U/L (ref 10–50)
BASOPHILS # BLD: 0.04 K/UL (ref 0–0.2)
BASOPHILS NFR BLD: 1 % (ref 0–2)
BILIRUB SERPL-MCNC: 0.5 MG/DL (ref 0–1.2)
BUN SERPL-MCNC: 17 MG/DL (ref 6–20)
CALCIUM SERPL-MCNC: 8.9 MG/DL (ref 8.6–10.4)
CHLORIDE SERPL-SCNC: 104 MMOL/L (ref 98–107)
CHOLEST SERPL-MCNC: 255 MG/DL (ref 0–199)
CHOLESTEROL/HDL RATIO: 4.8
CO2 SERPL-SCNC: 25 MMOL/L (ref 20–31)
CREAT SERPL-MCNC: 1.1 MG/DL (ref 0.7–1.2)
EOSINOPHIL # BLD: 0.14 K/UL (ref 0–0.44)
EOSINOPHILS RELATIVE PERCENT: 3 % (ref 1–4)
ERYTHROCYTE [DISTWIDTH] IN BLOOD BY AUTOMATED COUNT: 13 % (ref 11.8–14.4)
GFR, ESTIMATED: 78 ML/MIN/1.73M2
GLUCOSE SERPL-MCNC: 99 MG/DL (ref 74–99)
HCT VFR BLD AUTO: 44.7 % (ref 40.7–50.3)
HDLC SERPL-MCNC: 53 MG/DL
HGB BLD-MCNC: 15 G/DL (ref 13–17)
IMM GRANULOCYTES # BLD AUTO: <0.03 K/UL (ref 0–0.3)
IMM GRANULOCYTES NFR BLD: 0 %
LDLC SERPL CALC-MCNC: 174 MG/DL (ref 0–100)
LYMPHOCYTES NFR BLD: 1.2 K/UL (ref 1.1–3.7)
LYMPHOCYTES RELATIVE PERCENT: 24 % (ref 24–43)
MCH RBC QN AUTO: 30.1 PG (ref 25.2–33.5)
MCHC RBC AUTO-ENTMCNC: 33.6 G/DL (ref 28.4–34.8)
MCV RBC AUTO: 89.6 FL (ref 82.6–102.9)
MONOCYTES NFR BLD: 0.63 K/UL (ref 0.1–1.2)
MONOCYTES NFR BLD: 13 % (ref 3–12)
NEUTROPHILS NFR BLD: 59 % (ref 36–65)
NEUTS SEG NFR BLD: 2.98 K/UL (ref 1.5–8.1)
NRBC BLD-RTO: 0 PER 100 WBC
PLATELET # BLD AUTO: 201 K/UL (ref 138–453)
PMV BLD AUTO: 10 FL (ref 8.1–13.5)
POTASSIUM SERPL-SCNC: 4.7 MMOL/L (ref 3.7–5.3)
PROT SERPL-MCNC: 7 G/DL (ref 6.6–8.7)
PSA SERPL-MCNC: 1.53 NG/ML (ref 0–4)
RBC # BLD AUTO: 4.99 M/UL (ref 4.21–5.77)
SHBG SERPL-SCNC: 28 NMOL/L (ref 19–76)
SODIUM SERPL-SCNC: 139 MMOL/L (ref 136–145)
T4 FREE SERPL-MCNC: 1.5 NG/DL (ref 0.92–1.68)
TESTOST FREE MFR SERPL: 80 PG/ML (ref 47–244)
TESTOST SERPL-MCNC: 363 NG/DL (ref 193–740)
TRIGL SERPL-MCNC: 141 MG/DL
TSH SERPL DL<=0.05 MIU/L-ACNC: 8.8 UIU/ML (ref 0.27–4.2)
VLDLC SERPL CALC-MCNC: 28 MG/DL (ref 1–30)
WBC OTHER # BLD: 5 K/UL (ref 3.5–11.3)

## 2025-02-24 RX ORDER — AMLODIPINE BESYLATE 5 MG/1
5 TABLET ORAL DAILY
Qty: 30 TABLET | Refills: 0 | Status: SHIPPED | OUTPATIENT
Start: 2025-02-24

## 2025-02-24 NOTE — PROGRESS NOTES
Patient is here to have his blood pressure checked. He is complaining of having hot flashes at night and getting flushed in the afternoon (friends state he gets red in the cheek area)

## 2025-02-24 NOTE — PROGRESS NOTES
Blood pressure is elevated today and was also high on 2/20/25 with Dr. Castaneda. Sent in lower dose medication. Please have him take daily and follow up BP check in 2 weeks.

## 2025-02-25 DIAGNOSIS — E78.49 OTHER HYPERLIPIDEMIA: ICD-10-CM

## 2025-02-25 DIAGNOSIS — E03.9 ACQUIRED HYPOTHYROIDISM: Primary | ICD-10-CM

## 2025-02-25 RX ORDER — LEVOTHYROXINE SODIUM 75 UG/1
75 TABLET ORAL DAILY
Qty: 90 TABLET | Refills: 0 | Status: SHIPPED | OUTPATIENT
Start: 2025-02-25

## 2025-02-26 ENCOUNTER — TELEPHONE (OUTPATIENT)
Dept: FAMILY MEDICINE CLINIC | Age: 60
End: 2025-02-26

## 2025-02-26 DIAGNOSIS — Z20.828 EXPOSURE TO INFLUENZA: Primary | ICD-10-CM

## 2025-02-26 NOTE — TELEPHONE ENCOUNTER
Patient called in complaining Congestion, Fever and Headaches that started last night 2/25/2025 and his wife tested positive for Flu A.     He will like something called into the pharmacies

## 2025-02-27 RX ORDER — OSELTAMIVIR PHOSPHATE 75 MG/1
75 CAPSULE ORAL DAILY
Qty: 7 CAPSULE | Refills: 0 | Status: SHIPPED | OUTPATIENT
Start: 2025-02-27 | End: 2025-03-06

## 2025-02-27 NOTE — TELEPHONE ENCOUNTER
The patient needs to be tested for influenza A.  I did call in a Tamiflu but just the dosage for exposure.  If he is positive for influenza A then I have to change his prescription.

## 2025-03-01 ENCOUNTER — HOSPITAL ENCOUNTER (EMERGENCY)
Age: 60
Discharge: HOME | End: 2025-03-01
Attending: STUDENT IN AN ORGANIZED HEALTH CARE EDUCATION/TRAINING PROGRAM
Payer: OTHER GOVERNMENT

## 2025-03-01 ENCOUNTER — APPOINTMENT (OUTPATIENT)
Dept: GENERAL RADIOLOGY | Age: 60
End: 2025-03-01
Payer: OTHER GOVERNMENT

## 2025-03-01 VITALS
DIASTOLIC BLOOD PRESSURE: 77 MMHG | HEART RATE: 78 BPM | BODY MASS INDEX: 28.13 KG/M2 | OXYGEN SATURATION: 93 % | SYSTOLIC BLOOD PRESSURE: 130 MMHG | WEIGHT: 185 LBS | RESPIRATION RATE: 15 BRPM | TEMPERATURE: 98.8 F

## 2025-03-01 DIAGNOSIS — J06.9 ACUTE UPPER RESPIRATORY INFECTION: Primary | ICD-10-CM

## 2025-03-01 LAB
ANION GAP SERPL CALCULATED.3IONS-SCNC: 12 MMOL/L (ref 9–16)
BASOPHILS # BLD: 0 K/UL (ref 0–0.2)
BASOPHILS NFR BLD: 0 %
BNP SERPL-MCNC: 67 PG/ML (ref 0–125)
BUN SERPL-MCNC: 10 MG/DL (ref 6–20)
CALCIUM SERPL-MCNC: 9.1 MG/DL (ref 8.6–10.4)
CHLORIDE SERPL-SCNC: 102 MMOL/L (ref 98–107)
CO2 SERPL-SCNC: 23 MMOL/L (ref 20–31)
CREAT SERPL-MCNC: 1.1 MG/DL (ref 0.7–1.2)
D DIMER PPP FEU-MCNC: 0.46 UG/ML FEU (ref 0–0.59)
EOSINOPHIL # BLD: 0.1 K/UL (ref 0–0.4)
EOSINOPHILS RELATIVE PERCENT: 2 % (ref 1–4)
ERYTHROCYTE [DISTWIDTH] IN BLOOD BY AUTOMATED COUNT: 13.1 % (ref 11.8–14.4)
GFR, ESTIMATED: 80 ML/MIN/1.73M2
GLUCOSE SERPL-MCNC: 103 MG/DL (ref 74–99)
HCT VFR BLD AUTO: 44.9 % (ref 40.7–50.3)
HGB BLD-MCNC: 15.8 G/DL (ref 13–17)
IMM GRANULOCYTES # BLD AUTO: 0 K/UL (ref 0–0.3)
IMM GRANULOCYTES NFR BLD: 0 %
LYMPHOCYTES NFR BLD: 0.67 K/UL (ref 1–4.8)
LYMPHOCYTES RELATIVE PERCENT: 14 % (ref 24–44)
MCH RBC QN AUTO: 31.5 PG (ref 25.2–33.5)
MCHC RBC AUTO-ENTMCNC: 35.2 G/DL (ref 28.4–34.8)
MCV RBC AUTO: 89.4 FL (ref 82.6–102.9)
MONOCYTES NFR BLD: 0.96 K/UL (ref 0.2–0.8)
MONOCYTES NFR BLD: 20 % (ref 1–7)
NEUTROPHILS NFR BLD: 64 % (ref 36–66)
NEUTS SEG NFR BLD: 3.07 K/UL (ref 1.8–7.7)
NRBC BLD-RTO: 0 PER 100 WBC
PLATELET # BLD AUTO: 159 K/UL (ref 138–453)
PMV BLD AUTO: 9.3 FL (ref 8.1–13.5)
POTASSIUM SERPL-SCNC: 4.3 MMOL/L (ref 3.7–5.3)
RBC # BLD AUTO: 5.02 M/UL (ref 4.21–5.77)
SODIUM SERPL-SCNC: 137 MMOL/L (ref 136–145)
TROPONIN I SERPL HS-MCNC: 8 NG/L (ref 0–22)
WBC OTHER # BLD: 4.8 K/UL (ref 3.5–11.3)

## 2025-03-01 PROCEDURE — 71045 X-RAY EXAM CHEST 1 VIEW: CPT

## 2025-03-01 PROCEDURE — 85379 FIBRIN DEGRADATION QUANT: CPT

## 2025-03-01 PROCEDURE — 99284 EMERGENCY DEPT VISIT MOD MDM: CPT

## 2025-03-01 PROCEDURE — 84484 ASSAY OF TROPONIN QUANT: CPT

## 2025-03-01 PROCEDURE — 83880 ASSAY OF NATRIURETIC PEPTIDE: CPT

## 2025-03-01 PROCEDURE — 85025 COMPLETE CBC W/AUTO DIFF WBC: CPT

## 2025-03-01 PROCEDURE — 80048 BASIC METABOLIC PNL TOTAL CA: CPT

## 2025-03-01 RX ORDER — GUAIFENESIN/DEXTROMETHORPHAN 100-10MG/5
5 SYRUP ORAL 3 TIMES DAILY PRN
Qty: 120 ML | Refills: 0 | Status: SHIPPED | OUTPATIENT
Start: 2025-03-01 | End: 2025-03-11

## 2025-03-03 RX ORDER — MONTELUKAST SODIUM 10 MG/1
10 TABLET ORAL NIGHTLY
Qty: 90 TABLET | Refills: 3 | Status: SHIPPED | OUTPATIENT
Start: 2025-03-03

## 2025-03-03 NOTE — ED PROVIDER NOTES
DISPOSITION/PLAN   DISPOSITION Decision To Discharge 03/01/2025 05:31:29 AM   DISPOSITION CONDITION Stable           OUTPATIENT FOLLOW UP THE PATIENT:  Liz Rosado MD  [address]  [facility]  [facility] [address]  [phone]    Schedule an appointment as soon as possible for a visit in 1 week  As needed      DISCHARGE MEDICATIONS:  Discharge Medication List as of 3/1/2025  5:38 AM        START taking these medications    Details   guaiFENesin-dextromethorphan (ROBITUSSIN DM) 100-10 MG/5ML syrup Take 5 mLs by mouth 3 times daily as needed for Cough, Disp-120 mL, R-0Normal             Arvind Wen DO  EmergencyMedicine Attending    (Please note that portions of this note were completed with a voice recognition program.  Efforts were made to edit the dictations but occasionally words are mis-transcribed.)     Arvind Wen DO  03/03/25 0224

## 2025-03-18 DIAGNOSIS — R03.0 ELEVATED BLOOD PRESSURE READING: ICD-10-CM

## 2025-03-18 RX ORDER — AMLODIPINE BESYLATE 5 MG/1
5 TABLET ORAL DAILY
Qty: 30 TABLET | Refills: 0 | Status: SHIPPED | OUTPATIENT
Start: 2025-03-18 | End: 2025-04-23

## 2025-04-15 ENCOUNTER — CLINICAL SUPPORT (OUTPATIENT)
Dept: FAMILY MEDICINE CLINIC | Age: 60
End: 2025-04-15

## 2025-04-15 VITALS
BODY MASS INDEX: 28.95 KG/M2 | DIASTOLIC BLOOD PRESSURE: 76 MMHG | OXYGEN SATURATION: 97 % | WEIGHT: 191 LBS | HEART RATE: 56 BPM | HEIGHT: 68 IN | SYSTOLIC BLOOD PRESSURE: 130 MMHG

## 2025-04-15 DIAGNOSIS — I10 HYPERTENSION, UNSPECIFIED TYPE: Primary | ICD-10-CM

## 2025-04-15 NOTE — PROGRESS NOTES
Patient came in today for a blood pressure check.Patient took medication this morning at 7:30 am.

## 2025-04-23 DIAGNOSIS — R03.0 ELEVATED BLOOD PRESSURE READING: ICD-10-CM

## 2025-04-23 RX ORDER — AMLODIPINE BESYLATE 5 MG/1
5 TABLET ORAL DAILY
Qty: 30 TABLET | Refills: 3 | Status: SHIPPED | OUTPATIENT
Start: 2025-04-23

## 2025-04-24 DIAGNOSIS — G89.29 CHRONIC PAIN OF RIGHT KNEE: ICD-10-CM

## 2025-04-24 DIAGNOSIS — M79.671 PAIN IN BOTH FEET: ICD-10-CM

## 2025-04-24 DIAGNOSIS — M25.512 CHRONIC LEFT SHOULDER PAIN: ICD-10-CM

## 2025-04-24 DIAGNOSIS — M79.672 PAIN IN BOTH FEET: ICD-10-CM

## 2025-04-24 DIAGNOSIS — G89.29 CHRONIC LEFT SHOULDER PAIN: ICD-10-CM

## 2025-04-24 DIAGNOSIS — M25.561 CHRONIC PAIN OF RIGHT KNEE: ICD-10-CM

## 2025-04-24 RX ORDER — MELOXICAM 15 MG/1
TABLET ORAL
Qty: 30 TABLET | Refills: 3 | Status: SHIPPED | OUTPATIENT
Start: 2025-04-24

## 2025-05-18 DIAGNOSIS — E03.9 ACQUIRED HYPOTHYROIDISM: ICD-10-CM

## 2025-05-19 RX ORDER — LEVOTHYROXINE SODIUM 75 UG/1
75 TABLET ORAL DAILY
Qty: 90 TABLET | Refills: 0 | Status: SHIPPED | OUTPATIENT
Start: 2025-05-19

## 2025-05-19 NOTE — TELEPHONE ENCOUNTER
Xander Harrington is calling to request a refill on the following medication(s):    Last Visit Date (If Applicable):  2/20/2025    Next Visit Date:    Visit date not found    Medication Request:  Requested Prescriptions     Pending Prescriptions Disp Refills    levothyroxine (SYNTHROID) 75 MCG tablet [Pharmacy Med Name: Levothyroxine Sodium Oral Tablet 75 MCG] 90 tablet 0     Sig: TAKE 1 TABLET BY MOUTH EVERY DAY

## 2025-06-16 ENCOUNTER — OFFICE VISIT (OUTPATIENT)
Dept: FAMILY MEDICINE CLINIC | Age: 60
End: 2025-06-16
Payer: OTHER GOVERNMENT

## 2025-06-16 VITALS
BODY MASS INDEX: 29.13 KG/M2 | DIASTOLIC BLOOD PRESSURE: 85 MMHG | HEART RATE: 65 BPM | WEIGHT: 191.6 LBS | SYSTOLIC BLOOD PRESSURE: 134 MMHG

## 2025-06-16 DIAGNOSIS — M79.671 PAIN IN BOTH FEET: ICD-10-CM

## 2025-06-16 DIAGNOSIS — E03.9 ACQUIRED HYPOTHYROIDISM: Primary | ICD-10-CM

## 2025-06-16 DIAGNOSIS — E78.2 MIXED HYPERLIPIDEMIA: ICD-10-CM

## 2025-06-16 DIAGNOSIS — M79.672 PAIN IN BOTH FEET: ICD-10-CM

## 2025-06-16 PROCEDURE — 99214 OFFICE O/P EST MOD 30 MIN: CPT | Performed by: FAMILY MEDICINE

## 2025-06-16 NOTE — PROGRESS NOTES
[facility]  [facility]  [address]    [facility] [address]  Dept: [phone]      Xander Harrington is a 59 y.o. male who presents today for follow up on his  medical conditions as noted below.      Chief Complaint   Patient presents with    Foot Pain       Patient Active Problem List:     Hypothyroidism     Regular astigmatism     Allergic rhinitis     Change in bowel function     Irritable bowel syndrome with diarrhea     Liquid stool     COVID-19     Diverticulosis of colon     Tubular adenoma of colon     Nummular eczema     Gastroesophageal reflux disease     Hx of colonic polyps     Past Medical History:   Diagnosis Date    Allergic rhinitis     Change in bowel function     Chronic fatigue     Colon polyp 2021    COVID-19 02/2021    Pt reports he has had COVID three times. Most recently 1/2024. Never hospitalized    Excessive daytime sleepiness     GERD (gastroesophageal reflux disease)     Graves disease     had radiation treatment    Hypothyroidism     Dr. Garibay    Mixed hyperlipidemia     Nummular eczema     Screen for colon cancer     Sleep apnea with use of continuous positive airway pressure (CPAP)     The pt normally wears a nasal piece. He is in the process of obtaining a new oral device, he will not be able to use nasal piece after his nasal surgery    Snoring       Past Surgical History:   Procedure Laterality Date    COLONOSCOPY N/A 4/19/2021    COLONOSCOPY POLYPECTOMY performed by Michael Roger MD at [facility] OR    COLONOSCOPY N/A 10/2/2024    COLORECTAL CANCER SCREENING, NOT HIGH RISK performed by Michael Roger MD at [facility] OR    DENTAL SURGERY      inplant     NASAL SURG PROC UNLISTED N/A 3/18/2024    SEPTOPLASTY performed by Esau Alvarez MD at [facility] OR    NOSE SURGERY Bilateral 3/18/2024    BILATERAL TURBINATE REDUCTION performed by Esau Alvarez MD at [facility] OR    UPPER GASTROINTESTINAL ENDOSCOPY N/A 10/2/2024

## 2025-06-17 ENCOUNTER — HOSPITAL ENCOUNTER (OUTPATIENT)
Age: 60
Setting detail: SPECIMEN
Discharge: HOME | End: 2025-06-17

## 2025-06-17 DIAGNOSIS — E78.49 OTHER HYPERLIPIDEMIA: ICD-10-CM

## 2025-06-17 DIAGNOSIS — E03.9 ACQUIRED HYPOTHYROIDISM: ICD-10-CM

## 2025-06-17 LAB
CHOLEST SERPL-MCNC: 315 MG/DL (ref 0–199)
CHOLESTEROL/HDL RATIO: 4.8
HDLC SERPL-MCNC: 65 MG/DL
LDLC SERPL CALC-MCNC: 227 MG/DL (ref 0–100)
T4 FREE SERPL-MCNC: 0.8 NG/DL (ref 0.92–1.68)
TRIGL SERPL-MCNC: 113 MG/DL
TSH SERPL DL<=0.05 MIU/L-ACNC: 52 UIU/ML (ref 0.27–4.2)
VLDLC SERPL CALC-MCNC: 23 MG/DL (ref 1–30)

## 2025-06-19 ENCOUNTER — TELEPHONE (OUTPATIENT)
Dept: FAMILY MEDICINE CLINIC | Age: 60
End: 2025-06-19

## 2025-06-19 DIAGNOSIS — E78.2 MIXED HYPERLIPIDEMIA: ICD-10-CM

## 2025-06-19 DIAGNOSIS — E03.9 ACQUIRED HYPOTHYROIDISM: Primary | ICD-10-CM

## 2025-06-23 RX ORDER — LEVOTHYROXINE SODIUM 100 UG/1
100 TABLET ORAL DAILY
Qty: 90 TABLET | Refills: 1 | Status: SHIPPED | OUTPATIENT
Start: 2025-06-23

## 2025-06-23 RX ORDER — ROSUVASTATIN CALCIUM 20 MG/1
20 TABLET, COATED ORAL DAILY
Qty: 90 TABLET | Refills: 1 | Status: SHIPPED | OUTPATIENT
Start: 2025-06-23

## 2025-06-23 NOTE — TELEPHONE ENCOUNTER
Patient is currently taking 75 mcg of levothyroxine and is okay with increasing it and taking a statin. Please advise.          [id number] - [place], [place] - [address] [phone] - F [phone]

## 2025-07-01 ENCOUNTER — RESULTS FOLLOW-UP (OUTPATIENT)
Dept: FAMILY MEDICINE CLINIC | Age: 60
End: 2025-07-01

## 2025-07-01 DIAGNOSIS — K21.9 GASTROESOPHAGEAL REFLUX DISEASE WITHOUT ESOPHAGITIS: ICD-10-CM

## 2025-07-01 DIAGNOSIS — R05.3 CHRONIC COUGH: ICD-10-CM

## 2025-07-01 RX ORDER — PANTOPRAZOLE SODIUM 20 MG/1
20 TABLET, DELAYED RELEASE ORAL
Qty: 30 TABLET | Refills: 5 | Status: SHIPPED | OUTPATIENT
Start: 2025-07-01

## 2025-07-01 NOTE — TELEPHONE ENCOUNTER
Xander Harrington is calling to request a refill on the following medication(s):    Last Visit Date (If Applicable):  9/16/2024    Next Visit Date:    9/16/2025    Medication Request:  Requested Prescriptions     Pending Prescriptions Disp Refills    pantoprazole (PROTONIX) 20 MG tablet [Pharmacy Med Name: Pantoprazole Sodium Oral Tablet Delayed Release 20 MG] 30 tablet 0     Sig: TAKE 1 TABLET BY MOUTH IN THE MORNING BEFORE BREAKFAST

## 2025-08-14 DIAGNOSIS — R03.0 ELEVATED BLOOD PRESSURE READING: ICD-10-CM

## 2025-08-14 RX ORDER — AMLODIPINE BESYLATE 5 MG/1
5 TABLET ORAL DAILY
Qty: 30 TABLET | Refills: 5 | Status: SHIPPED | OUTPATIENT
Start: 2025-08-14

## (undated) DEVICE — COUNTER NDL 40 COUNT HLD 70 FOAM BLK ADH W/ MAG

## (undated) DEVICE — CUP MED 1OZ CLR POLYPR FEED GRAD W/O LID

## (undated) DEVICE — GOWN,AURORA,NONREINFORCED,LARGE: Brand: MEDLINE

## (undated) DEVICE — POSITIONER HD W8XH4XL8.5IN RASPBERRY FOAM SLT

## (undated) DEVICE — SPONGE GZ W2XL2IN NONWOVEN 4 PLY FASTER WICKING ABIL AVANT

## (undated) DEVICE — CO2 CANNULA,SUPERSOFT, ADLT,7'O2,7'CO2: Brand: MEDLINE

## (undated) DEVICE — GOWN,AURORA,NON-REINFORCED,2XL: Brand: MEDLINE

## (undated) DEVICE — MEDICINE CUP, GRADUATED, STER: Brand: MEDLINE

## (undated) DEVICE — CODMAN® SURGICAL PATTIES 1/2" X 3" (1.27CM X 7.62CM): Brand: CODMAN®

## (undated) DEVICE — BITEBLOCK ENDOSCP 60FR MAXI WHT POLYETH STURDY W/ VELC WVN

## (undated) DEVICE — SUTURE CHROMIC GUT SZ 4-0 L18IN ABSRB BRN L13MM P-3 3/8 CIR 1654G

## (undated) DEVICE — SYRINGES CONTROL 10ML W/ROTATOR

## (undated) DEVICE — SPLINT 1524055 DOYLE II AIRWAY SET: Brand: DOYLE II ™

## (undated) DEVICE — REFLEX ULTRA 45 WITH INTEGRATED CABLE: Brand: COBLATION

## (undated) DEVICE — 4-PORT MANIFOLD: Brand: NEPTUNE 2

## (undated) DEVICE — ADAPTER TBNG LUER STUB 15 GA INTMED

## (undated) DEVICE — JELLY,LUBE,STERILE,FLIP TOP,TUBE,2-OZ: Brand: MEDLINE

## (undated) DEVICE — SYRINGE MED 50ML LUERLOCK TIP

## (undated) DEVICE — FORCEPS BX L240CM JAW DIA2.2MM RAD JAW 4 HOT DISP

## (undated) DEVICE — COAGULATOR SUCT 10FR LAIN FTSWCH ACTIVATION DISP VALLEYLAB

## (undated) DEVICE — YANKAUER,FLEXIBLE HANDLE,REGLR CAPACITY: Brand: MEDLINE INDUSTRIES, INC.

## (undated) DEVICE — Device: Brand: DEFENDO VALVE AND CONNECTOR KIT

## (undated) DEVICE — GAUZE,SPONGE,4"X4",16PLY,STRL,LF,10/TRAY: Brand: MEDLINE

## (undated) DEVICE — STAZ ENT: Brand: MEDLINE INDUSTRIES, INC.

## (undated) DEVICE — STAZ MINOR BASIN: Brand: MEDLINE INDUSTRIES, INC.

## (undated) DEVICE — BLADE,CARBON-STEEL,15,STRL,DISPOSABLE,TB: Brand: MEDLINE

## (undated) DEVICE — SUTURE ETHLN SZ 3-0 L18IN NONABSORBABLE BLK L24MM PS-1 3/8 1663G

## (undated) DEVICE — TUBING, SUCTION, 1/4" X 12', STRAIGHT: Brand: MEDLINE

## (undated) DEVICE — DRAPE,REIN 53X77,STERILE: Brand: MEDLINE

## (undated) DEVICE — GLOVE SURG 8 11.7IN BEAD CUF LIGHT BRN SENSICARE LTX FREE

## (undated) DEVICE — SOLUTION IRRIG 500ML 0.9% SOD CHLO USP POUR PLAS BTL

## (undated) DEVICE — SYRINGE MED 10ML TRNSLUC BRL PLUNG BLK MRK POLYPR CTRL

## (undated) DEVICE — STAZ ENDO KIT: Brand: MEDLINE INDUSTRIES, INC.

## (undated) DEVICE — DRAPE,EENT,SPLIT,STERILE: Brand: MEDLINE

## (undated) DEVICE — MARKER,SKIN,WI/RULER AND LABELS: Brand: MEDLINE

## (undated) DEVICE — SUTURE NONABSORBABLE MONOFILAMENT 2-0 FS 18 IN ETHILON 664H

## (undated) DEVICE — GLOVE SURG 9 PF CRM STRL SENSICARE PI MIC LF

## (undated) DEVICE — BASIN EMSIS 700ML GRAPHITE PLAS KID SHP GRAD

## (undated) DEVICE — ELECTRODE PT RET AD L9FT HI MOIST COND ADH HYDRGEL CORDED

## (undated) DEVICE — SINGLE-USE BIOPSY FORCEPS: Brand: RADIAL JAW 4

## (undated) DEVICE — HYPODERMIC SAFETY NEEDLE: Brand: MAGELLAN

## (undated) DEVICE — GAUZE,SPONGE,4"X4",16PLY,XRAY,STRL,LF: Brand: MEDLINE